# Patient Record
Sex: MALE | Race: BLACK OR AFRICAN AMERICAN | NOT HISPANIC OR LATINO | Employment: OTHER | RURAL
[De-identification: names, ages, dates, MRNs, and addresses within clinical notes are randomized per-mention and may not be internally consistent; named-entity substitution may affect disease eponyms.]

---

## 2020-07-16 ENCOUNTER — HISTORICAL (OUTPATIENT)
Dept: ADMINISTRATIVE | Facility: HOSPITAL | Age: 60
End: 2020-07-16

## 2020-07-30 ENCOUNTER — HISTORICAL (OUTPATIENT)
Dept: ADMINISTRATIVE | Facility: HOSPITAL | Age: 60
End: 2020-07-30

## 2020-07-30 LAB
ALBUMIN SERPL BCP-MCNC: 3.6 G/DL (ref 3.5–5)
ALBUMIN/GLOB SERPL: 0.7 {RATIO}
ALP SERPL-CCNC: 97 U/L (ref 45–115)
ALT SERPL W P-5'-P-CCNC: 33 U/L (ref 16–61)
ANION GAP SERPL CALCULATED.3IONS-SCNC: 22 MMOL/L
ANION GAP SERPL CALCULATED.3IONS-SCNC: 22 MMOL/L
APTT PPP: 26.5 SECONDS (ref 25.2–37.3)
AST SERPL W P-5'-P-CCNC: 20 U/L (ref 15–37)
BASOPHILS # BLD AUTO: 0.04 X10E3/UL (ref 0–0.2)
BASOPHILS NFR BLD AUTO: 0.3 % (ref 0–1)
BILIRUB SERPL-MCNC: 0.7 MG/DL (ref 0–1.2)
BILIRUB UR QL STRIP: NEGATIVE MG/DL
BUN SERPL-MCNC: 53 MG/DL (ref 7–18)
BUN SERPL-MCNC: 55 MG/DL (ref 7–18)
BUN/CREAT SERPL: 17.6
CALCIUM SERPL-MCNC: 9.1 MG/DL (ref 8.5–10.1)
CALCIUM SERPL-MCNC: 9.2 MG/DL (ref 8.5–10.1)
CHLORIDE SERPL-SCNC: 75 MMOL/L (ref 98–107)
CHLORIDE SERPL-SCNC: 80 MMOL/L (ref 98–107)
CK MB SERPL-MCNC: 2.2 NG/ML (ref 1–3.6)
CK SERPL-CCNC: 217 U/L (ref 39–308)
CLARITY UR: CLEAR
CO2 SERPL-SCNC: 17 MMOL/L (ref 21–32)
CO2 SERPL-SCNC: 21 MMOL/L (ref 21–32)
COLOR UR: YELLOW
CREAT SERPL-MCNC: 3.01 MG/DL (ref 0.7–1.3)
CREAT SERPL-MCNC: 3.11 MG/DL (ref 0.7–1.3)
EOSINOPHIL # BLD AUTO: 0.03 X10E3/UL (ref 0–0.5)
EOSINOPHIL NFR BLD AUTO: 0.3 % (ref 1–4)
ERYTHROCYTE [DISTWIDTH] IN BLOOD BY AUTOMATED COUNT: 14.3 % (ref 11.5–14.5)
GLOBULIN SER-MCNC: 5.1 G/DL (ref 2–4)
GLUCOSE SERPL-MCNC: 377 MG/DL (ref 70–105)
GLUCOSE SERPL-MCNC: 769 MG/DL (ref 74–106)
GLUCOSE SERPL-MCNC: 858 MG/DL (ref 74–106)
GLUCOSE SERPL-MCNC: >500 MG/DL (ref 60–95)
GLUCOSE SERPL-MCNC: >500 MG/DL (ref 60–95)
GLUCOSE UR STRIP-MCNC: ABNORMAL MG/DL
HCT VFR BLD AUTO: 43.2 % (ref 40–54)
HGB BLD-MCNC: 13.8 G/DL (ref 13.5–18)
IMM GRANULOCYTES # BLD AUTO: 0.06 X10E3/UL (ref 0–0.04)
IMM GRANULOCYTES NFR BLD: 0.5 % (ref 0–0.4)
INR BLD: 1.09 (ref 0–3.3)
KETONES UR STRIP-SCNC: ABNORMAL MG/DL
KETONES UR STRIP-SCNC: ABNORMAL MMOL/L
LACTATE SERPL-SCNC: 2.3 MMOL/L (ref 0.4–2)
LDH SERPL L TO P-CCNC: 2.3 MMOL/L (ref 0.3–1.2)
LDH SERPL L TO P-CCNC: 3.3 MMOL/L (ref 0.3–1.2)
LEUKOCYTE ESTERASE UR QL STRIP: NEGATIVE LEU/UL
LYMPHOCYTES # BLD AUTO: 1.1 X10E3/UL (ref 1–4.8)
LYMPHOCYTES NFR BLD AUTO: 9.3 % (ref 27–41)
MCH RBC QN AUTO: 29.4 PG (ref 27–31)
MCHC RBC AUTO-ENTMCNC: 31.9 G/DL (ref 32–36)
MCV RBC AUTO: 91.9 FL (ref 80–96)
MONOCYTES # BLD AUTO: 0.79 X10E3/UL (ref 0–0.8)
MONOCYTES NFR BLD AUTO: 6.6 % (ref 2–6)
MPC BLD CALC-MCNC: 13.8 FL (ref 9.4–12.4)
MYOGLOBIN SERPL-MCNC: 241 NG/ML (ref 16–116)
NEUTROPHILS # BLD AUTO: 9.87 X10E3/UL (ref 1.8–7.7)
NEUTROPHILS NFR BLD AUTO: 83 % (ref 53–65)
NITRITE UR QL STRIP: NEGATIVE
NRBC # BLD AUTO: 0 X10E3/UL (ref 0–0)
NRBC, AUTO (.00): 0 /100 (ref 0–0)
NT-PROBNP SERPL-MCNC: 95 PG/ML (ref 1–125)
PH UR STRIP: 5 PH UNITS (ref 5–8)
PLATELET # BLD AUTO: 263 X10E3/UL (ref 150–400)
PLATELET MORPHOLOGY: ABNORMAL
POC BASE EXCESS: -3.5 MMOL/L (ref -2–3)
POC BASE EXCESS: -3.9 MMOL/L (ref -2–3)
POC HCO3 VENOUS: 22 MMOL/L (ref 24–28)
POC HCO3 VENOUS: 24 MMOL/L (ref 24–28)
POC HCT: 48 % (ref 35–51)
POC HCT: 52 % (ref 35–51)
POC IONIZED CALCIUM: 1.13 MMOL/L (ref 1.15–1.35)
POC IONIZED CALCIUM: 1.14 MMOL/L (ref 1.15–1.35)
POC PCO2 VENOUS: 40 MM HG (ref 41–51)
POC PCO2 VENOUS: 53 MM HG (ref 41–51)
POC PH VENOUS: 7.27 PH UNITS (ref 7.32–7.42)
POC PH VENOUS: 7.34 PH UNITS (ref 7.32–7.42)
POC PO2 VENOUS: 23 MM HG (ref 25–40)
POC PO2 VENOUS: 30 MM HG (ref 25–40)
POC SATURATED O2 VENOUS: 31 % (ref 40–70)
POC SATURATED O2 VENOUS: 53 % (ref 40–70)
POTASSIUM SERPL-SCNC: 4.6 MMOL/L (ref 3.5–5.1)
POTASSIUM SERPL-SCNC: 4.7 MMOL/L (ref 3.4–4.5)
POTASSIUM SERPL-SCNC: 5 MMOL/L (ref 3.5–5.1)
POTASSIUM SERPL-SCNC: 5.2 MMOL/L (ref 3.4–4.5)
PROT SERPL-MCNC: 8.7 G/DL (ref 6.4–8.2)
PROT UR QL STRIP: ABNORMAL MG/DL
PROTHROMBIN TIME: 13.6 SECONDS (ref 11.7–14.7)
RBC # BLD AUTO: 4.7 X10E6/UL (ref 4.6–6.2)
RBC # UR STRIP: NEGATIVE ERY/UL
RBC MORPH BLD: NORMAL
SODIUM SERPL-SCNC: 113 MMOL/L (ref 136–145)
SODIUM SERPL-SCNC: 113 MMOL/L (ref 136–145)
SODIUM SERPL-SCNC: 114 MMOL/L (ref 136–145)
SODIUM SERPL-SCNC: 114 MMOL/L (ref 136–145)
SP GR UR STRIP: 1.01 (ref 1–1.03)
TROPONIN I SERPL-MCNC: <0.017 NG/ML (ref 0–0.06)
UROBILINOGEN UR STRIP-ACNC: 0.2 EU/DL
WBC # BLD AUTO: 11.89 X10E3/UL (ref 4.5–11)

## 2020-07-31 ENCOUNTER — HISTORICAL (OUTPATIENT)
Dept: ADMINISTRATIVE | Facility: HOSPITAL | Age: 60
End: 2020-07-31

## 2020-07-31 LAB
ANION GAP SERPL CALCULATED.3IONS-SCNC: 11 MMOL/L
ANION GAP SERPL CALCULATED.3IONS-SCNC: 12 MMOL/L
BUN SERPL-MCNC: 55 MG/DL (ref 7–18)
BUN SERPL-MCNC: 56 MG/DL (ref 7–18)
CALCIUM SERPL-MCNC: 8.8 MG/DL (ref 8.5–10.1)
CALCIUM SERPL-MCNC: 9 MG/DL (ref 8.5–10.1)
CHLORIDE SERPL-SCNC: 90 MMOL/L (ref 98–107)
CHLORIDE SERPL-SCNC: 91 MMOL/L (ref 98–107)
CHOLEST SERPL-MCNC: 230 MG/DL
CHOLEST/HDLC SERPL: 6.8 {RATIO}
CO2 SERPL-SCNC: 26 MMOL/L (ref 21–32)
CO2 SERPL-SCNC: 29 MMOL/L (ref 21–32)
CREAT SERPL-MCNC: 2.04 MG/DL (ref 0.7–1.3)
CREAT SERPL-MCNC: 2.64 MG/DL (ref 0.7–1.3)
GLUCOSE SERPL-MCNC: 259 MG/DL (ref 70–105)
GLUCOSE SERPL-MCNC: 270 MG/DL (ref 70–105)
GLUCOSE SERPL-MCNC: 285 MG/DL (ref 70–105)
GLUCOSE SERPL-MCNC: 306 MG/DL (ref 74–106)
GLUCOSE SERPL-MCNC: 318 MG/DL (ref 70–105)
GLUCOSE SERPL-MCNC: 322 MG/DL (ref 70–105)
GLUCOSE SERPL-MCNC: 330 MG/DL (ref 70–105)
GLUCOSE SERPL-MCNC: 340 MG/DL (ref 70–105)
GLUCOSE SERPL-MCNC: 352 MG/DL (ref 70–105)
GLUCOSE SERPL-MCNC: 385 MG/DL (ref 70–105)
GLUCOSE SERPL-MCNC: 405 MG/DL (ref 74–106)
GLUCOSE SERPL-MCNC: 433 MG/DL (ref 70–105)
HCT VFR BLD AUTO: 40.5 % (ref 40–54)
HDLC SERPL-MCNC: 34 MG/DL
HGB BLD-MCNC: 13.1 G/DL (ref 13.5–18)
LACTATE SERPL-SCNC: 1.7 MMOL/L (ref 0.4–2)
LDLC SERPL CALC-MCNC: NORMAL MG/DL
PLATELET # BLD AUTO: 232 X10E3/UL (ref 150–400)
POTASSIUM SERPL-SCNC: 3.4 MMOL/L (ref 3.5–5.1)
POTASSIUM SERPL-SCNC: 4.1 MMOL/L (ref 3.5–5.1)
SODIUM SERPL-SCNC: 125 MMOL/L (ref 136–145)
SODIUM SERPL-SCNC: 127 MMOL/L (ref 136–145)
T4 SERPL-MCNC: 5.5 UG/DL (ref 4.5–12.1)
TRIGL SERPL-MCNC: 444 MG/DL
TSH SERPL DL<=0.005 MIU/L-ACNC: 0.88 UIU/ML (ref 0.36–3.74)

## 2020-08-01 ENCOUNTER — HISTORICAL (OUTPATIENT)
Dept: ADMINISTRATIVE | Facility: HOSPITAL | Age: 60
End: 2020-08-01

## 2020-08-01 LAB
ANION GAP SERPL CALCULATED.3IONS-SCNC: 13 MMOL/L
BASOPHILS # BLD AUTO: 0.03 X10E3/UL (ref 0–0.2)
BASOPHILS NFR BLD AUTO: 0.3 % (ref 0–1)
BUN SERPL-MCNC: 44 MG/DL (ref 7–18)
CALCIUM SERPL-MCNC: 9 MG/DL (ref 8.5–10.1)
CHLORIDE SERPL-SCNC: 96 MMOL/L (ref 98–107)
CO2 SERPL-SCNC: 26 MMOL/L (ref 21–32)
CREAT SERPL-MCNC: 1.55 MG/DL (ref 0.7–1.3)
EOSINOPHIL # BLD AUTO: 0.09 X10E3/UL (ref 0–0.5)
EOSINOPHIL NFR BLD AUTO: 1 % (ref 1–4)
ERYTHROCYTE [DISTWIDTH] IN BLOOD BY AUTOMATED COUNT: 14.3 % (ref 11.5–14.5)
GLUCOSE SERPL-MCNC: 233 MG/DL (ref 74–106)
GLUCOSE SERPL-MCNC: 260 MG/DL (ref 70–105)
GLUCOSE SERPL-MCNC: 399 MG/DL (ref 70–105)
GLUCOSE SERPL-MCNC: 407 MG/DL (ref 70–105)
GLUCOSE SERPL-MCNC: 444 MG/DL (ref 70–105)
HCT VFR BLD AUTO: 38.7 % (ref 40–54)
HGB BLD-MCNC: 12.7 G/DL (ref 13.5–18)
IMM GRANULOCYTES # BLD AUTO: 0.04 X10E3/UL (ref 0–0.04)
IMM GRANULOCYTES NFR BLD: 0.4 % (ref 0–0.4)
LYMPHOCYTES # BLD AUTO: 1.6 X10E3/UL (ref 1–4.8)
LYMPHOCYTES NFR BLD AUTO: 17.5 % (ref 27–41)
MCH RBC QN AUTO: 28.9 PG (ref 27–31)
MCHC RBC AUTO-ENTMCNC: 32.8 G/DL (ref 32–36)
MCV RBC AUTO: 88 FL (ref 80–96)
MONOCYTES # BLD AUTO: 0.82 X10E3/UL (ref 0–0.8)
MONOCYTES NFR BLD AUTO: 9 % (ref 2–6)
MPC BLD CALC-MCNC: 12.9 FL (ref 9.4–12.4)
NEUTROPHILS # BLD AUTO: 6.56 X10E3/UL (ref 1.8–7.7)
NEUTROPHILS NFR BLD AUTO: 71.8 % (ref 53–65)
NRBC # BLD AUTO: 0 X10E3/UL (ref 0–0)
NRBC, AUTO (.00): 0 /100 (ref 0–0)
PLATELET # BLD AUTO: 216 X10E3/UL (ref 150–400)
PLATELET MORPHOLOGY: ABNORMAL
POTASSIUM SERPL-SCNC: 3.6 MMOL/L (ref 3.5–5.1)
RBC # BLD AUTO: 4.4 X10E6/UL (ref 4.6–6.2)
RBC MORPH BLD: NORMAL
SODIUM SERPL-SCNC: 131 MMOL/L (ref 136–145)
URATE SERPL-MCNC: 10.6 MG/DL (ref 3.5–7.2)
WBC # BLD AUTO: 9.14 X10E3/UL (ref 4.5–11)

## 2020-08-02 ENCOUNTER — HISTORICAL (OUTPATIENT)
Dept: ADMINISTRATIVE | Facility: HOSPITAL | Age: 60
End: 2020-08-02

## 2020-08-02 LAB
ANION GAP SERPL CALCULATED.3IONS-SCNC: 15 MMOL/L
BUN SERPL-MCNC: 38 MG/DL (ref 7–18)
CALCIUM SERPL-MCNC: 8.7 MG/DL (ref 8.5–10.1)
CHLORIDE SERPL-SCNC: 96 MMOL/L (ref 98–107)
CO2 SERPL-SCNC: 24 MMOL/L (ref 21–32)
CREAT SERPL-MCNC: 1.59 MG/DL (ref 0.7–1.3)
GLUCOSE SERPL-MCNC: 347 MG/DL (ref 74–106)
GLUCOSE SERPL-MCNC: 368 MG/DL (ref 70–105)
POTASSIUM SERPL-SCNC: 4 MMOL/L (ref 3.5–5.1)
SODIUM SERPL-SCNC: 131 MMOL/L (ref 136–145)

## 2020-08-04 LAB
GLUCOSE SERPL-MCNC: 475 MG/DL (ref 70–105)
GLUCOSE SERPL-MCNC: 489 MG/DL (ref 70–105)
GLUCOSE SERPL-MCNC: 502 MG/DL (ref 70–105)
GLUCOSE SERPL-MCNC: 545 MG/DL (ref 70–105)
GLUCOSE SERPL-MCNC: >600 MG/DL (ref 70–105)
GLUCOSE SERPL-MCNC: >600 MG/DL (ref 70–105)

## 2021-06-16 RX ORDER — FERROUS SULFATE 325(65) MG
325 TABLET ORAL
COMMUNITY

## 2021-06-16 RX ORDER — AMLODIPINE BESYLATE 10 MG/1
10 TABLET ORAL DAILY
COMMUNITY
End: 2021-09-10 | Stop reason: SDUPTHER

## 2021-06-16 RX ORDER — IPRATROPIUM BROMIDE AND ALBUTEROL SULFATE 2.5; .5 MG/3ML; MG/3ML
3 SOLUTION RESPIRATORY (INHALATION) EVERY 6 HOURS PRN
COMMUNITY
End: 2021-06-30 | Stop reason: SDUPTHER

## 2021-06-16 RX ORDER — ALLOPURINOL 100 MG/1
100 TABLET ORAL DAILY
Qty: 90 TABLET | Refills: 1 | Status: SHIPPED | OUTPATIENT
Start: 2021-06-16 | End: 2021-12-13

## 2021-06-16 RX ORDER — ASCORBATE CALCIUM 500 MG
1 TABLET ORAL DAILY
COMMUNITY

## 2021-06-16 RX ORDER — INDOMETHACIN 50 MG/1
50 CAPSULE ORAL 3 TIMES DAILY PRN
Qty: 30 CAPSULE | Refills: 3 | Status: SHIPPED | OUTPATIENT
Start: 2021-06-16 | End: 2021-06-30 | Stop reason: SDUPTHER

## 2021-06-16 RX ORDER — FUROSEMIDE 40 MG/1
40 TABLET ORAL 2 TIMES DAILY
COMMUNITY
End: 2021-09-13 | Stop reason: SDUPTHER

## 2021-06-16 RX ORDER — NAPROXEN SODIUM 220 MG/1
81 TABLET, FILM COATED ORAL DAILY
COMMUNITY

## 2021-06-16 RX ORDER — ALLOPURINOL 100 MG/1
100 TABLET ORAL DAILY
COMMUNITY
End: 2021-06-16 | Stop reason: SDUPTHER

## 2021-06-16 RX ORDER — INDOMETHACIN 50 MG/1
50 CAPSULE ORAL 3 TIMES DAILY PRN
COMMUNITY
End: 2021-06-16 | Stop reason: SDUPTHER

## 2021-06-16 RX ORDER — INSULIN GLARGINE 300 U/ML
40 INJECTION, SOLUTION SUBCUTANEOUS NIGHTLY
COMMUNITY
End: 2021-06-30 | Stop reason: SDUPTHER

## 2021-06-16 RX ORDER — CARVEDILOL 25 MG/1
25 TABLET ORAL 2 TIMES DAILY WITH MEALS
COMMUNITY
End: 2021-09-13 | Stop reason: SDUPTHER

## 2021-06-16 RX ORDER — ATORVASTATIN CALCIUM 40 MG/1
40 TABLET, FILM COATED ORAL DAILY
COMMUNITY

## 2021-06-16 RX ORDER — LISINOPRIL 20 MG/1
20 TABLET ORAL DAILY
COMMUNITY
End: 2021-06-30 | Stop reason: SDUPTHER

## 2021-06-16 RX ORDER — VIT C/E/ZN/COPPR/LUTEIN/ZEAXAN 250MG-90MG
1000 CAPSULE ORAL DAILY
COMMUNITY

## 2021-06-30 RX ORDER — ALBUTEROL SULFATE 90 UG/1
2 AEROSOL, METERED RESPIRATORY (INHALATION) EVERY 6 HOURS PRN
COMMUNITY
End: 2021-06-30 | Stop reason: SDUPTHER

## 2021-07-01 RX ORDER — INSULIN GLARGINE 300 U/ML
40 INJECTION, SOLUTION SUBCUTANEOUS NIGHTLY
Qty: 4 PEN | Refills: 1 | Status: SHIPPED | OUTPATIENT
Start: 2021-07-01 | End: 2022-03-22

## 2021-07-01 RX ORDER — LISINOPRIL 20 MG/1
20 TABLET ORAL DAILY
Qty: 90 TABLET | Refills: 1 | Status: SHIPPED | OUTPATIENT
Start: 2021-07-01 | End: 2022-09-19

## 2021-07-01 RX ORDER — ALBUTEROL SULFATE 90 UG/1
2 AEROSOL, METERED RESPIRATORY (INHALATION) EVERY 6 HOURS PRN
Qty: 18 G | Refills: 1 | Status: SHIPPED | OUTPATIENT
Start: 2021-07-01 | End: 2021-12-28

## 2021-07-01 RX ORDER — INDOMETHACIN 50 MG/1
50 CAPSULE ORAL 3 TIMES DAILY PRN
Qty: 30 CAPSULE | Refills: 3 | Status: SHIPPED | OUTPATIENT
Start: 2021-07-01 | End: 2022-09-19

## 2021-07-01 RX ORDER — IPRATROPIUM BROMIDE AND ALBUTEROL SULFATE 2.5; .5 MG/3ML; MG/3ML
3 SOLUTION RESPIRATORY (INHALATION) EVERY 6 HOURS PRN
Qty: 1 BOX | Refills: 1 | Status: SHIPPED | OUTPATIENT
Start: 2021-07-01 | End: 2021-09-30 | Stop reason: SDUPTHER

## 2021-07-05 RX ORDER — LISINOPRIL 20 MG/1
20 TABLET ORAL DAILY
Qty: 90 TABLET | Refills: 1 | OUTPATIENT
Start: 2021-07-05 | End: 2022-01-01

## 2021-07-05 RX ORDER — INDOMETHACIN 50 MG/1
50 CAPSULE ORAL 3 TIMES DAILY PRN
Qty: 30 CAPSULE | Refills: 3 | OUTPATIENT
Start: 2021-07-05 | End: 2022-01-01

## 2021-07-05 RX ORDER — IPRATROPIUM BROMIDE AND ALBUTEROL SULFATE 2.5; .5 MG/3ML; MG/3ML
3 SOLUTION RESPIRATORY (INHALATION) EVERY 6 HOURS PRN
Qty: 1 BOX | Refills: 1 | OUTPATIENT
Start: 2021-07-05

## 2021-07-15 ENCOUNTER — APPOINTMENT (OUTPATIENT)
Dept: RADIOLOGY | Facility: CLINIC | Age: 61
End: 2021-07-15
Attending: FAMILY MEDICINE
Payer: MEDICARE

## 2021-07-15 ENCOUNTER — OFFICE VISIT (OUTPATIENT)
Dept: FAMILY MEDICINE | Facility: CLINIC | Age: 61
End: 2021-07-15
Payer: MEDICARE

## 2021-07-15 VITALS
OXYGEN SATURATION: 92 % | HEART RATE: 88 BPM | SYSTOLIC BLOOD PRESSURE: 166 MMHG | DIASTOLIC BLOOD PRESSURE: 82 MMHG | WEIGHT: 315 LBS

## 2021-07-15 DIAGNOSIS — I50.22 CHRONIC SYSTOLIC CONGESTIVE HEART FAILURE: ICD-10-CM

## 2021-07-15 DIAGNOSIS — F10.10 ALCOHOL ABUSE: ICD-10-CM

## 2021-07-15 DIAGNOSIS — M17.0 BILATERAL PRIMARY OSTEOARTHRITIS OF KNEE: ICD-10-CM

## 2021-07-15 DIAGNOSIS — I50.22 CHRONIC SYSTOLIC CONGESTIVE HEART FAILURE: Primary | ICD-10-CM

## 2021-07-15 DIAGNOSIS — I10 ESSENTIAL HYPERTENSION: ICD-10-CM

## 2021-07-15 DIAGNOSIS — E11.65 UNCONTROLLED TYPE 2 DIABETES MELLITUS WITH HYPERGLYCEMIA: ICD-10-CM

## 2021-07-15 DIAGNOSIS — I42.5 OTHER RESTRICTIVE CARDIOMYOPATHY: ICD-10-CM

## 2021-07-15 DIAGNOSIS — F17.200 SMOKER: ICD-10-CM

## 2021-07-15 PROBLEM — M1A.09X0 CHRONIC IDIOPATHIC GOUT OF MULTIPLE SITES: Status: ACTIVE | Noted: 2021-07-15

## 2021-07-15 PROCEDURE — 3079F PR MOST RECENT DIASTOLIC BLOOD PRESSURE 80-89 MM HG: ICD-10-PCS | Mod: ,,, | Performed by: FAMILY MEDICINE

## 2021-07-15 PROCEDURE — 99214 OFFICE O/P EST MOD 30 MIN: CPT | Mod: 25,,, | Performed by: FAMILY MEDICINE

## 2021-07-15 PROCEDURE — 3077F PR MOST RECENT SYSTOLIC BLOOD PRESSURE >= 140 MM HG: ICD-10-PCS | Mod: ,,, | Performed by: FAMILY MEDICINE

## 2021-07-15 PROCEDURE — 96372 PR INJECTION,THERAP/PROPH/DIAG2ST, IM OR SUBCUT: ICD-10-PCS | Mod: ,,, | Performed by: FAMILY MEDICINE

## 2021-07-15 PROCEDURE — 71046 X-RAY EXAM CHEST 2 VIEWS: CPT | Mod: 26,,, | Performed by: RADIOLOGY

## 2021-07-15 PROCEDURE — 71046 XR CHEST PA AND LATERAL: ICD-10-PCS | Mod: 26,,, | Performed by: RADIOLOGY

## 2021-07-15 PROCEDURE — 96372 THER/PROPH/DIAG INJ SC/IM: CPT | Mod: ,,, | Performed by: FAMILY MEDICINE

## 2021-07-15 PROCEDURE — 71046 X-RAY EXAM CHEST 2 VIEWS: CPT | Mod: TC,RHCUB,FY | Performed by: FAMILY MEDICINE

## 2021-07-15 PROCEDURE — 3079F DIAST BP 80-89 MM HG: CPT | Mod: ,,, | Performed by: FAMILY MEDICINE

## 2021-07-15 PROCEDURE — 3077F SYST BP >= 140 MM HG: CPT | Mod: ,,, | Performed by: FAMILY MEDICINE

## 2021-07-15 PROCEDURE — 99214 PR OFFICE/OUTPT VISIT, EST, LEVL IV, 30-39 MIN: ICD-10-PCS | Mod: 25,,, | Performed by: FAMILY MEDICINE

## 2021-07-15 RX ORDER — POTASSIUM CHLORIDE 20 MEQ/1
20 TABLET, EXTENDED RELEASE ORAL
Status: COMPLETED | OUTPATIENT
Start: 2021-07-15 | End: 2021-07-15

## 2021-07-15 RX ORDER — IBUPROFEN 200 MG
200 TABLET ORAL EVERY 6 HOURS PRN
Status: ON HOLD | COMMUNITY
End: 2023-08-14 | Stop reason: HOSPADM

## 2021-07-15 RX ORDER — LANCETS 33 GAUGE
EACH MISCELLANEOUS
COMMUNITY
Start: 2021-07-07

## 2021-07-15 RX ORDER — LANOLIN ALCOHOL/MO/W.PET/CERES
100 CREAM (GRAM) TOPICAL DAILY
COMMUNITY

## 2021-07-15 RX ORDER — BLOOD-GLUCOSE METER
EACH MISCELLANEOUS
COMMUNITY
Start: 2021-04-29

## 2021-07-15 RX ORDER — FUROSEMIDE 10 MG/ML
20 INJECTION INTRAMUSCULAR; INTRAVENOUS
Status: COMPLETED | OUTPATIENT
Start: 2021-07-15 | End: 2021-07-15

## 2021-07-15 RX ORDER — BLOOD GLUCOSE CONTROL HIGH,LOW
EACH MISCELLANEOUS
COMMUNITY
Start: 2021-04-29

## 2021-07-15 RX ADMIN — FUROSEMIDE 20 MG: 10 INJECTION INTRAMUSCULAR; INTRAVENOUS at 12:07

## 2021-07-15 RX ADMIN — POTASSIUM CHLORIDE 20 MEQ: 20 TABLET, EXTENDED RELEASE ORAL at 12:07

## 2021-09-13 RX ORDER — CARVEDILOL 25 MG/1
25 TABLET ORAL 2 TIMES DAILY WITH MEALS
Qty: 180 TABLET | Refills: 0 | Status: ON HOLD | OUTPATIENT
Start: 2021-09-13 | End: 2022-10-06 | Stop reason: HOSPADM

## 2021-09-13 RX ORDER — AMLODIPINE BESYLATE 10 MG/1
10 TABLET ORAL DAILY
Qty: 90 TABLET | Refills: 0 | Status: SHIPPED | OUTPATIENT
Start: 2021-09-13

## 2021-09-13 RX ORDER — AMLODIPINE BESYLATE 10 MG/1
10 TABLET ORAL DAILY
Qty: 90 TABLET | Refills: 0 | Status: SHIPPED | OUTPATIENT
Start: 2021-09-13 | End: 2021-09-13 | Stop reason: SDUPTHER

## 2021-09-13 RX ORDER — FUROSEMIDE 40 MG/1
40 TABLET ORAL 2 TIMES DAILY
Qty: 180 TABLET | Refills: 0 | Status: ON HOLD | OUTPATIENT
Start: 2021-09-13 | End: 2022-10-06

## 2021-10-01 RX ORDER — LANCETS 33 GAUGE
EACH MISCELLANEOUS
OUTPATIENT
Start: 2021-10-01

## 2021-10-01 RX ORDER — AMLODIPINE BESYLATE 10 MG/1
10 TABLET ORAL DAILY
Qty: 90 TABLET | Refills: 0 | OUTPATIENT
Start: 2021-10-01

## 2021-10-01 RX ORDER — ATORVASTATIN CALCIUM 40 MG/1
40 TABLET, FILM COATED ORAL DAILY
Qty: 90 TABLET | Refills: 0 | OUTPATIENT
Start: 2021-10-01

## 2021-11-18 ENCOUNTER — TELEPHONE (OUTPATIENT)
Dept: FAMILY MEDICINE | Facility: CLINIC | Age: 61
End: 2021-11-18
Payer: MEDICARE

## 2021-11-18 DIAGNOSIS — E11.65 UNCONTROLLED TYPE 2 DIABETES MELLITUS WITH HYPERGLYCEMIA: Primary | ICD-10-CM

## 2021-11-18 RX ORDER — INSULIN ASPART 100 [IU]/ML
INJECTION, SOLUTION INTRAVENOUS; SUBCUTANEOUS
Qty: 4 EACH | Refills: 5 | Status: SHIPPED | OUTPATIENT
Start: 2021-11-18

## 2022-10-03 ENCOUNTER — HOSPITAL ENCOUNTER (INPATIENT)
Facility: HOSPITAL | Age: 62
LOS: 4 days | Discharge: HOME | DRG: 291 | End: 2022-10-07
Attending: INTERNAL MEDICINE | Admitting: INTERNAL MEDICINE
Payer: COMMERCIAL

## 2022-10-03 DIAGNOSIS — N18.2 STAGE 2 CHRONIC KIDNEY DISEASE: ICD-10-CM

## 2022-10-03 DIAGNOSIS — E11.65 UNCONTROLLED TYPE 2 DIABETES MELLITUS WITH HYPERGLYCEMIA: ICD-10-CM

## 2022-10-03 DIAGNOSIS — I50.9 ACUTE ON CHRONIC HEART FAILURE, UNSPECIFIED HEART FAILURE TYPE: ICD-10-CM

## 2022-10-03 DIAGNOSIS — M1A.09X0 IDIOPATHIC CHRONIC GOUT OF MULTIPLE SITES WITHOUT TOPHUS: ICD-10-CM

## 2022-10-03 DIAGNOSIS — E66.01 SEVERE OBESITY (BMI >= 40): ICD-10-CM

## 2022-10-03 DIAGNOSIS — M17.0 BILATERAL PRIMARY OSTEOARTHRITIS OF KNEE: ICD-10-CM

## 2022-10-03 DIAGNOSIS — I50.9 CHF (CONGESTIVE HEART FAILURE): ICD-10-CM

## 2022-10-03 DIAGNOSIS — I10 ESSENTIAL HYPERTENSION: ICD-10-CM

## 2022-10-03 DIAGNOSIS — J44.1 ACUTE EXACERBATION OF CHRONIC OBSTRUCTIVE PULMONARY DISEASE (COPD): ICD-10-CM

## 2022-10-03 DIAGNOSIS — I50.23 ACUTE ON CHRONIC SYSTOLIC HEART FAILURE: ICD-10-CM

## 2022-10-03 DIAGNOSIS — M10.9 GOUT OF MULTIPLE SITES, UNSPECIFIED CAUSE, UNSPECIFIED CHRONICITY: ICD-10-CM

## 2022-10-03 DIAGNOSIS — R06.02 SHORTNESS OF BREATH: ICD-10-CM

## 2022-10-03 DIAGNOSIS — I42.5 OTHER RESTRICTIVE CARDIOMYOPATHY: ICD-10-CM

## 2022-10-03 DIAGNOSIS — R07.9 CHEST PAIN: ICD-10-CM

## 2022-10-03 DIAGNOSIS — I50.9 CONGESTIVE HEART FAILURE, UNSPECIFIED HF CHRONICITY, UNSPECIFIED HEART FAILURE TYPE: Primary | ICD-10-CM

## 2022-10-03 LAB
ALBUMIN SERPL BCP-MCNC: 3.5 G/DL (ref 3.5–5)
ALBUMIN/GLOB SERPL: 0.8 {RATIO}
ALP SERPL-CCNC: 97 U/L (ref 45–115)
ALT SERPL W P-5'-P-CCNC: 60 U/L (ref 16–61)
ANION GAP SERPL CALCULATED.3IONS-SCNC: 9 MMOL/L (ref 7–16)
AST SERPL W P-5'-P-CCNC: 35 U/L (ref 15–37)
BASOPHILS # BLD AUTO: 0.02 K/UL (ref 0–0.2)
BASOPHILS NFR BLD AUTO: 0.2 % (ref 0–1)
BILIRUB SERPL-MCNC: 0.6 MG/DL (ref ?–1.2)
BUN SERPL-MCNC: 34 MG/DL (ref 7–18)
BUN/CREAT SERPL: 26 (ref 6–20)
CALCIUM SERPL-MCNC: 9 MG/DL (ref 8.5–10.1)
CHLORIDE SERPL-SCNC: 102 MMOL/L (ref 98–107)
CO2 SERPL-SCNC: 32 MMOL/L (ref 21–32)
CREAT SERPL-MCNC: 1.29 MG/DL (ref 0.7–1.3)
DIFFERENTIAL METHOD BLD: ABNORMAL
EGFR (NO RACE VARIABLE) (RUSH/TITUS): 63 ML/MIN/1.73M²
EOSINOPHIL # BLD AUTO: 0.27 K/UL (ref 0–0.5)
EOSINOPHIL NFR BLD AUTO: 2.2 % (ref 1–4)
ERYTHROCYTE [DISTWIDTH] IN BLOOD BY AUTOMATED COUNT: 16.8 % (ref 11.5–14.5)
FLUAV AG UPPER RESP QL IA.RAPID: NEGATIVE
FLUBV AG UPPER RESP QL IA.RAPID: NEGATIVE
GLOBULIN SER-MCNC: 4.6 G/DL (ref 2–4)
GLUCOSE SERPL-MCNC: 76 MG/DL (ref 74–106)
GLUCOSE SERPL-MCNC: 93 MG/DL (ref 70–105)
HCT VFR BLD AUTO: 33.1 % (ref 40–54)
HGB BLD-MCNC: 9.3 G/DL (ref 13.5–18)
IMM GRANULOCYTES # BLD AUTO: 0.05 K/UL (ref 0–0.04)
IMM GRANULOCYTES NFR BLD: 0.4 % (ref 0–0.4)
LYMPHOCYTES # BLD AUTO: 0.86 K/UL (ref 1–4.8)
LYMPHOCYTES NFR BLD AUTO: 7.1 % (ref 27–41)
MCH RBC QN AUTO: 26.1 PG (ref 27–31)
MCHC RBC AUTO-ENTMCNC: 28.1 G/DL (ref 32–36)
MCV RBC AUTO: 93 FL (ref 80–96)
MONOCYTES # BLD AUTO: 1.08 K/UL (ref 0–0.8)
MONOCYTES NFR BLD AUTO: 9 % (ref 2–6)
MPC BLD CALC-MCNC: 10 FL (ref 9.4–12.4)
NEUTROPHILS # BLD AUTO: 9.76 K/UL (ref 1.8–7.7)
NEUTROPHILS NFR BLD AUTO: 81.1 % (ref 53–65)
NRBC # BLD AUTO: 0.06 X10E3/UL
NRBC, AUTO (.00): 0.5 %
NT-PROBNP SERPL-MCNC: 635 PG/ML (ref 1–125)
PLATELET # BLD AUTO: 281 K/UL (ref 150–400)
POTASSIUM SERPL-SCNC: 4.4 MMOL/L (ref 3.5–5.1)
PROT SERPL-MCNC: 8.1 G/DL (ref 6.4–8.2)
RBC # BLD AUTO: 3.56 M/UL (ref 4.6–6.2)
SARS-COV+SARS-COV-2 AG RESP QL IA.RAPID: NEGATIVE
SODIUM SERPL-SCNC: 139 MMOL/L (ref 136–145)
TROPONIN I SERPL HS-MCNC: 12 PG/ML
WBC # BLD AUTO: 12.04 K/UL (ref 4.5–11)

## 2022-10-03 PROCEDURE — 25000003 PHARM REV CODE 250: Performed by: INTERNAL MEDICINE

## 2022-10-03 PROCEDURE — 36415 COLL VENOUS BLD VENIPUNCTURE: CPT | Performed by: NURSE PRACTITIONER

## 2022-10-03 PROCEDURE — 96374 THER/PROPH/DIAG INJ IV PUSH: CPT

## 2022-10-03 PROCEDURE — 85025 COMPLETE CBC W/AUTO DIFF WBC: CPT | Performed by: NURSE PRACTITIONER

## 2022-10-03 PROCEDURE — 63600175 PHARM REV CODE 636 W HCPCS: Performed by: INTERNAL MEDICINE

## 2022-10-03 PROCEDURE — 80053 COMPREHEN METABOLIC PANEL: CPT | Performed by: NURSE PRACTITIONER

## 2022-10-03 PROCEDURE — 93005 ELECTROCARDIOGRAM TRACING: CPT

## 2022-10-03 PROCEDURE — 11000001 HC ACUTE MED/SURG PRIVATE ROOM

## 2022-10-03 PROCEDURE — 63700000 PHARM REV CODE 250 ALT 637 W/O HCPCS: Performed by: INTERNAL MEDICINE

## 2022-10-03 PROCEDURE — 93010 EKG 12-LEAD: ICD-10-PCS | Mod: ,,, | Performed by: INTERNAL MEDICINE

## 2022-10-03 PROCEDURE — 99285 EMERGENCY DEPT VISIT HI MDM: CPT | Mod: 25

## 2022-10-03 PROCEDURE — 99223 1ST HOSP IP/OBS HIGH 75: CPT | Mod: ,,, | Performed by: INTERNAL MEDICINE

## 2022-10-03 PROCEDURE — 96376 TX/PRO/DX INJ SAME DRUG ADON: CPT

## 2022-10-03 PROCEDURE — 99223 PR INITIAL HOSPITAL CARE,LEVL III: ICD-10-PCS | Mod: ,,, | Performed by: INTERNAL MEDICINE

## 2022-10-03 PROCEDURE — 99284 PR EMERGENCY DEPT VISIT,LEVEL IV: ICD-10-PCS | Mod: ,,, | Performed by: NURSE PRACTITIONER

## 2022-10-03 PROCEDURE — 93010 ELECTROCARDIOGRAM REPORT: CPT | Mod: ,,, | Performed by: INTERNAL MEDICINE

## 2022-10-03 PROCEDURE — 87428 SARSCOV & INF VIR A&B AG IA: CPT | Performed by: NURSE PRACTITIONER

## 2022-10-03 PROCEDURE — 63600175 PHARM REV CODE 636 W HCPCS: Performed by: NURSE PRACTITIONER

## 2022-10-03 PROCEDURE — 84484 ASSAY OF TROPONIN QUANT: CPT | Performed by: NURSE PRACTITIONER

## 2022-10-03 PROCEDURE — 99284 EMERGENCY DEPT VISIT MOD MDM: CPT | Mod: ,,, | Performed by: NURSE PRACTITIONER

## 2022-10-03 PROCEDURE — 83880 ASSAY OF NATRIURETIC PEPTIDE: CPT | Performed by: NURSE PRACTITIONER

## 2022-10-03 PROCEDURE — 82962 GLUCOSE BLOOD TEST: CPT

## 2022-10-03 RX ORDER — HYDROCODONE BITARTRATE AND ACETAMINOPHEN 5; 325 MG/1; MG/1
1 TABLET ORAL EVERY 6 HOURS PRN
Status: DISCONTINUED | OUTPATIENT
Start: 2022-10-03 | End: 2022-10-07 | Stop reason: HOSPADM

## 2022-10-03 RX ORDER — ACETAMINOPHEN 325 MG/1
650 TABLET ORAL EVERY 4 HOURS PRN
Status: DISCONTINUED | OUTPATIENT
Start: 2022-10-03 | End: 2022-10-07 | Stop reason: HOSPADM

## 2022-10-03 RX ORDER — AZITHROMYCIN 250 MG/1
250 TABLET, FILM COATED ORAL DAILY
Status: COMPLETED | OUTPATIENT
Start: 2022-10-04 | End: 2022-10-07

## 2022-10-03 RX ORDER — NAPROXEN SODIUM 220 MG/1
81 TABLET, FILM COATED ORAL DAILY
Status: DISCONTINUED | OUTPATIENT
Start: 2022-10-04 | End: 2022-10-07 | Stop reason: HOSPADM

## 2022-10-03 RX ORDER — COLCHICINE 0.6 MG/1
0.6 TABLET, FILM COATED ORAL DAILY
Status: DISCONTINUED | OUTPATIENT
Start: 2022-10-04 | End: 2022-10-07 | Stop reason: HOSPADM

## 2022-10-03 RX ORDER — IPRATROPIUM BROMIDE AND ALBUTEROL SULFATE 2.5; .5 MG/3ML; MG/3ML
3 SOLUTION RESPIRATORY (INHALATION)
Status: DISCONTINUED | OUTPATIENT
Start: 2022-10-03 | End: 2022-10-07 | Stop reason: HOSPADM

## 2022-10-03 RX ORDER — ALLOPURINOL 100 MG/1
100 TABLET ORAL DAILY
Status: DISCONTINUED | OUTPATIENT
Start: 2022-10-04 | End: 2022-10-07 | Stop reason: HOSPADM

## 2022-10-03 RX ORDER — SPIRONOLACTONE 25 MG/1
25 TABLET ORAL DAILY
COMMUNITY

## 2022-10-03 RX ORDER — FUROSEMIDE 10 MG/ML
80 INJECTION INTRAMUSCULAR; INTRAVENOUS
Status: COMPLETED | OUTPATIENT
Start: 2022-10-03 | End: 2022-10-03

## 2022-10-03 RX ORDER — GLUCAGON 1 MG
1 KIT INJECTION
Status: DISCONTINUED | OUTPATIENT
Start: 2022-10-03 | End: 2022-10-07 | Stop reason: HOSPADM

## 2022-10-03 RX ORDER — ATORVASTATIN CALCIUM 40 MG/1
40 TABLET, FILM COATED ORAL DAILY
Status: DISCONTINUED | OUTPATIENT
Start: 2022-10-04 | End: 2022-10-07 | Stop reason: HOSPADM

## 2022-10-03 RX ORDER — IBUPROFEN 200 MG
16 TABLET ORAL
Status: DISCONTINUED | OUTPATIENT
Start: 2022-10-03 | End: 2022-10-07 | Stop reason: HOSPADM

## 2022-10-03 RX ORDER — INSULIN ASPART 100 [IU]/ML
1-10 INJECTION, SOLUTION INTRAVENOUS; SUBCUTANEOUS
Status: DISCONTINUED | OUTPATIENT
Start: 2022-10-03 | End: 2022-10-07 | Stop reason: HOSPADM

## 2022-10-03 RX ORDER — ALLOPURINOL 100 MG/1
100 TABLET ORAL DAILY
COMMUNITY

## 2022-10-03 RX ORDER — SODIUM CHLORIDE 0.9 % (FLUSH) 0.9 %
10 SYRINGE (ML) INJECTION EVERY 12 HOURS PRN
Status: DISCONTINUED | OUTPATIENT
Start: 2022-10-03 | End: 2022-10-07 | Stop reason: HOSPADM

## 2022-10-03 RX ORDER — AZITHROMYCIN 250 MG/1
500 TABLET, FILM COATED ORAL ONCE
Status: COMPLETED | OUTPATIENT
Start: 2022-10-03 | End: 2022-10-03

## 2022-10-03 RX ORDER — MORPHINE SULFATE 2 MG/ML
2 INJECTION, SOLUTION INTRAMUSCULAR; INTRAVENOUS EVERY 4 HOURS PRN
Status: DISCONTINUED | OUTPATIENT
Start: 2022-10-04 | End: 2022-10-07 | Stop reason: HOSPADM

## 2022-10-03 RX ORDER — IBUPROFEN 200 MG
24 TABLET ORAL
Status: DISCONTINUED | OUTPATIENT
Start: 2022-10-03 | End: 2022-10-07 | Stop reason: HOSPADM

## 2022-10-03 RX ORDER — COLCHICINE 0.6 MG/1
0.6 TABLET ORAL DAILY
COMMUNITY

## 2022-10-03 RX ORDER — IPRATROPIUM BROMIDE AND ALBUTEROL SULFATE 2.5; .5 MG/3ML; MG/3ML
3 SOLUTION RESPIRATORY (INHALATION) EVERY 6 HOURS
Status: DISCONTINUED | OUTPATIENT
Start: 2022-10-04 | End: 2022-10-07 | Stop reason: HOSPADM

## 2022-10-03 RX ORDER — AMLODIPINE BESYLATE 10 MG/1
10 TABLET ORAL DAILY
Status: DISCONTINUED | OUTPATIENT
Start: 2022-10-04 | End: 2022-10-07 | Stop reason: HOSPADM

## 2022-10-03 RX ORDER — ENOXAPARIN SODIUM 100 MG/ML
40 INJECTION SUBCUTANEOUS EVERY 24 HOURS
Status: DISCONTINUED | OUTPATIENT
Start: 2022-10-03 | End: 2022-10-07 | Stop reason: HOSPADM

## 2022-10-03 RX ORDER — ONDANSETRON 2 MG/ML
4 INJECTION INTRAMUSCULAR; INTRAVENOUS EVERY 8 HOURS PRN
Status: DISCONTINUED | OUTPATIENT
Start: 2022-10-03 | End: 2022-10-07 | Stop reason: HOSPADM

## 2022-10-03 RX ORDER — INSULIN GLARGINE 300 U/ML
40 INJECTION, SOLUTION SUBCUTANEOUS DAILY
Status: ON HOLD | COMMUNITY
End: 2023-08-14 | Stop reason: SDUPTHER

## 2022-10-03 RX ORDER — SPIRONOLACTONE 25 MG/1
25 TABLET ORAL DAILY
Status: DISCONTINUED | OUTPATIENT
Start: 2022-10-04 | End: 2022-10-07 | Stop reason: HOSPADM

## 2022-10-03 RX ORDER — NALOXONE HCL 0.4 MG/ML
0.02 VIAL (ML) INJECTION
Status: DISCONTINUED | OUTPATIENT
Start: 2022-10-03 | End: 2022-10-07 | Stop reason: HOSPADM

## 2022-10-03 RX ORDER — FUROSEMIDE 10 MG/ML
40 INJECTION INTRAMUSCULAR; INTRAVENOUS EVERY 6 HOURS
Status: DISCONTINUED | OUTPATIENT
Start: 2022-10-03 | End: 2022-10-04

## 2022-10-03 RX ORDER — CARVEDILOL 6.25 MG/1
12.5 TABLET ORAL 2 TIMES DAILY WITH MEALS
Status: DISCONTINUED | OUTPATIENT
Start: 2022-10-04 | End: 2022-10-07 | Stop reason: HOSPADM

## 2022-10-03 RX ADMIN — AZITHROMYCIN MONOHYDRATE 500 MG: 250 TABLET ORAL at 11:10

## 2022-10-03 RX ADMIN — FUROSEMIDE 40 MG: 10 INJECTION INTRAMUSCULAR; INTRAVENOUS at 10:10

## 2022-10-03 RX ADMIN — MORPHINE SULFATE 2 MG: 2 INJECTION, SOLUTION INTRAMUSCULAR; INTRAVENOUS at 11:10

## 2022-10-03 RX ADMIN — SACUBITRIL AND VALSARTAN 1 TABLET: 24; 26 TABLET, FILM COATED ORAL at 10:10

## 2022-10-03 RX ADMIN — FUROSEMIDE 80 MG: 10 INJECTION INTRAMUSCULAR; INTRAVENOUS at 06:10

## 2022-10-03 RX ADMIN — HYDROCODONE BITARTRATE AND ACETAMINOPHEN 1 TABLET: 5; 325 TABLET ORAL at 11:10

## 2022-10-03 RX ADMIN — ENOXAPARIN SODIUM 40 MG: 40 INJECTION SUBCUTANEOUS at 11:10

## 2022-10-03 NOTE — ED TRIAGE NOTES
Presents to ED via EMS from [facility] for c/o bilateral lower extremity swelling and SOB. Patient has hx of CHF and COPD, wears 3L O2 at home.

## 2022-10-03 NOTE — ED PROVIDER NOTES
Encounter Date: 10/3/2022       History     Chief Complaint   Patient presents with    Shortness of Breath    Leg Swelling     62 year old male presents to ED with complaint of shortness of breath and lower extremity swelling that started on last Wednesday. He states he was noticing an increase in SOB and was having to sleep in his recliner for 1 week due to feeling like he was smothering. Use of home O2 and states he has not had to increase oxygen demands at this time. States he attempted to see PCP at clinic in [place] and was unable to get an appointment. Denies chest pain, palpitations, weakness, dizziness, nausea/vomiting.     The history is provided by the patient. No  was used.   Shortness of Breath  The problem occurs intermittently.The current episode started more than 1 week ago. The problem has been gradually worsening. Associated symptoms include cough, orthopnea and leg swelling. Pertinent negatives include no fever, no sore throat, no sputum production, no chest pain and no vomiting. He has tried ipratropium inhalers and beta-agonist inhalers for the symptoms. The treatment provided no relief. He has had No prior hospitalizations. He has had No prior ED visits. He has had No prior ICU admissions. Associated medical issues include heart failure.   Review of patient's allergies indicates:  No Known Allergies  Past Medical History:   Diagnosis Date    Cardiomyopathy     Diabetes mellitus, type 2     Hypertension     Obstructive sleep apnea on CPAP     Osteoarthritis      History reviewed. No pertinent surgical history.  History reviewed. No pertinent family history.  Social History     Tobacco Use    Smoking status: Never    Smokeless tobacco: Never   Substance Use Topics    Alcohol use: Never    Drug use: Never     Review of Systems   Constitutional:  Negative for activity change, appetite change and fever.   HENT:  Negative for sore throat.    Respiratory:  Positive for cough and  shortness of breath. Negative for sputum production.    Cardiovascular:  Positive for orthopnea and leg swelling. Negative for chest pain.   Gastrointestinal:  Negative for nausea and vomiting.   Genitourinary:  Negative for difficulty urinating and urgency.   Musculoskeletal:  Negative for arthralgias and gait problem.   Skin:  Negative for color change and wound.   Allergic/Immunologic: Negative for environmental allergies and food allergies.   Neurological:  Negative for dizziness and weakness.   Hematological:  Negative for adenopathy. Does not bruise/bleed easily.   Psychiatric/Behavioral:  Negative for agitation and confusion.    All other systems reviewed and are negative.    Physical Exam     Initial Vitals [10/03/22 1631]   BP Pulse Resp Temp SpO2   (!) 164/84 85 17 98.5 °F (36.9 °C) 97 %      MAP       --         Physical Exam    Nursing note and vitals reviewed.  Constitutional: He appears well-developed and well-nourished.   HENT:   Head: Normocephalic and atraumatic.   Eyes: EOM are normal. Pupils are equal, round, and reactive to light.   Neck: Neck supple.   Normal range of motion.  Cardiovascular:  Normal rate and regular rhythm.           Pulmonary/Chest: He has no wheezes. He has no rhonchi.   Abdominal: He exhibits distension.   Abdomen round, firm; non-tender   Musculoskeletal:         General: No tenderness.      Cervical back: Normal range of motion and neck supple.      Right lower le+ Edema present.      Left lower le+ Edema present.      Comments: LE edema; nonpitting; hyperpigmentation     Neurological: He is alert and oriented to person, place, and time. No cranial nerve deficit or sensory deficit.   Skin: Skin is warm and dry. Capillary refill takes less than 2 seconds.   Psychiatric: He has a normal mood and affect. Thought content normal.       Medical Screening Exam   See Full Note    ED Course   Procedures  Labs Reviewed   COMPREHENSIVE METABOLIC PANEL - Abnormal; Notable for  the following components:       Result Value    BUN 34 (*)     BUN/Creatinine Ratio 26 (*)     Globulin 4.6 (*)     All other components within normal limits   NT-PRO NATRIURETIC PEPTIDE - Abnormal; Notable for the following components:    ProBNP 635 (*)     All other components within normal limits   CBC WITH DIFFERENTIAL - Abnormal; Notable for the following components:    WBC 12.04 (*)     RBC 3.56 (*)     Hemoglobin 9.3 (*)     Hematocrit 33.1 (*)     MCH 26.1 (*)     MCHC 28.1 (*)     RDW 16.8 (*)     Neutrophils % 81.1 (*)     Lymphocytes % 7.1 (*)     Monocytes % 9.0 (*)     nRBC, Auto 0.5 (*)     Neutrophils, Abs 9.76 (*)     Lymphocytes, Absolute 0.86 (*)     Monocytes, Absolute 1.08 (*)     Immature Granulocytes, Absolute 0.05 (*)     nRBC, Absolute 0.06 (*)     All other components within normal limits   TROPONIN I - Normal   SARS-COV2 (COVID) W/ FLU ANTIGEN - Normal    Narrative:     Negative SARS-CoV results should not be used as the sole basis for treatment or patient management decisions; negative results should be considered in the context of a patient's recent exposures, history and the presene of clinical signs and symptoms consistent with COVID-19.  Negative results should be treated as presumptive and confirmed by molecular assay, if necessary for patient management.   CBC W/ AUTO DIFFERENTIAL    Narrative:     The following orders were created for panel order CBC auto differential.  Procedure                               Abnormality         Status                     ---------                               -----------         ------                     CBC with Differential[[id number]]        Abnormal            Final result                 Please view results for these tests on the individual orders.   EXTRA TUBES    Narrative:     The following orders were created for panel order EXTRA TUBES.  Procedure                               Abnormality         Status                     ---------                                -----------         ------                     Light Blue Top Hold[[id number]]                              In process                   Please view results for these tests on the individual orders.   LIGHT BLUE TOP HOLD          Imaging Results              X-Ray Chest AP Portable (Final result)  Result time 10/03/22 17:48:20      Final result by Jermaine Saucedo MD (10/03/22 17:48:20)                   Impression:      Cardiomegaly and CHF changes.      Electronically signed by: Jermaine Saucedo  Date:    10/03/2022  Time:    17:48               Narrative:    EXAMINATION:  XR CHEST AP PORTABLE    CLINICAL HISTORY:  CHF;    TECHNIQUE:  Single frontal view of the chest was performed.    COMPARISON:  07/15/2021    FINDINGS:  Cardiomegaly again seen.  Interstitial densities throughout.  No pneumothorax.  Possible trace pleural effusions.                                       Medications   furosemide injection 80 mg (80 mg Intravenous Given 10/3/22 2658)                       Clinical Impression:   Final diagnoses:  [R06.02] Shortness of breath  [I50.9] Congestive heart failure, unspecified HF chronicity, unspecified heart failure type (Primary)        ED Disposition Condition    Admit Stable                JOHNNY Milligan  10/03/22 2001

## 2022-10-04 LAB
ANION GAP SERPL CALCULATED.3IONS-SCNC: 9 MMOL/L (ref 7–16)
AORTIC ROOT ANNULUS: 2.2 CM
AORTIC VALVE CUSP SEPERATION: 1.89 CM
AV INDEX (PROSTH): 0.77
AV MEAN GRADIENT: 4 MMHG
AV PEAK GRADIENT: 7 MMHG
AV VALVE AREA: 1.97 CM2
AV VELOCITY RATIO: 0.77
BSA FOR ECHO PROCEDURE: 2.87 M2
BUN SERPL-MCNC: 30 MG/DL (ref 7–18)
BUN/CREAT SERPL: 27 (ref 6–20)
CALCIUM SERPL-MCNC: 9.1 MG/DL (ref 8.5–10.1)
CHLORIDE SERPL-SCNC: 100 MMOL/L (ref 98–107)
CO2 SERPL-SCNC: 34 MMOL/L (ref 21–32)
CREAT SERPL-MCNC: 1.12 MG/DL (ref 0.7–1.3)
CV ECHO LV RWT: 0.4 CM
DOP CALC AO PEAK VEL: 1.3 M/S
DOP CALC AO VTI: 22 CM
DOP CALC LVOT AREA: 2.5 CM2
DOP CALC LVOT DIAMETER: 1.8 CM
DOP CALC LVOT PEAK VEL: 1 M/S
DOP CALC LVOT STROKE VOLUME: 43.24 CM3
DOP CALC MV VTI: 99.6 CM
DOP CALCLVOT PEAK VEL VTI: 17 CM
E WAVE DECELERATION TIME: 204 MSEC
ECHO EF ESTIMATED: 55 %
ECHO LV POSTERIOR WALL: 1.2 CM (ref 0.6–1.1)
EGFR (NO RACE VARIABLE) (RUSH/TITUS): 75 ML/MIN/1.73M²
EJECTION FRACTION: 65 %
FRACTIONAL SHORTENING: 31 % (ref 28–44)
GLUCOSE SERPL-MCNC: 130 MG/DL (ref 70–105)
GLUCOSE SERPL-MCNC: 147 MG/DL (ref 74–106)
GLUCOSE SERPL-MCNC: 168 MG/DL (ref 70–105)
GLUCOSE SERPL-MCNC: 318 MG/DL (ref 70–105)
INTERVENTRICULAR SEPTUM: 1.2 CM (ref 0.6–1.1)
IVC OSTIUM: 2.7 CM
LEFT ATRIUM SIZE: 3.1 CM
LEFT INTERNAL DIMENSION IN SYSTOLE: 4.08 CM (ref 2.1–4)
LEFT VENTRICLE DIASTOLIC VOLUME INDEX: 63.94 ML/M2
LEFT VENTRICLE DIASTOLIC VOLUME: 175.2 ML
LEFT VENTRICLE MASS INDEX: 112 G/M2
LEFT VENTRICLE SYSTOLIC VOLUME INDEX: 26.8 ML/M2
LEFT VENTRICLE SYSTOLIC VOLUME: 73.4 ML
LEFT VENTRICULAR INTERNAL DIMENSION IN DIASTOLE: 5.93 CM (ref 3.5–6)
LEFT VENTRICULAR MASS: 308.01 G
LVOT MG: 2 MMHG
MV MEAN GRADIENT: 45 MMHG
MV PEAK E VEL: 3.02 M/S
MV PEAK GRADIENT: 70 MMHG
MV STENOSIS PRESSURE HALF TIME: 79 MS
MV VALVE AREA BY CONTINUITY EQUATION: 0.43 CM2
MV VALVE AREA P 1/2 METHOD: 2.78 CM2
PISA TR MAX VEL: 2.8 M/S
POTASSIUM SERPL-SCNC: 4.6 MMOL/L (ref 3.5–5.1)
RA MAJOR: 5 CM
RA PRESSURE: 15 MMHG
RIGHT VENTRICULAR END-DIASTOLIC DIMENSION: 3.9 CM
SODIUM SERPL-SCNC: 138 MMOL/L (ref 136–145)
TR MAX PG: 31 MMHG
TRICUSPID ANNULAR PLANE SYSTOLIC EXCURSION: 2 CM
TROPONIN I SERPL HS-MCNC: 10.4 PG/ML
TV REST PULMONARY ARTERY PRESSURE: 46 MMHG

## 2022-10-04 PROCEDURE — 25500020 PHARM REV CODE 255: Performed by: HOSPITALIST

## 2022-10-04 PROCEDURE — 25000242 PHARM REV CODE 250 ALT 637 W/ HCPCS: Performed by: INTERNAL MEDICINE

## 2022-10-04 PROCEDURE — 11000001 HC ACUTE MED/SURG PRIVATE ROOM

## 2022-10-04 PROCEDURE — 63600175 PHARM REV CODE 636 W HCPCS: Performed by: HOSPITALIST

## 2022-10-04 PROCEDURE — 94640 AIRWAY INHALATION TREATMENT: CPT

## 2022-10-04 PROCEDURE — 99900035 HC TECH TIME PER 15 MIN (STAT)

## 2022-10-04 PROCEDURE — 94761 N-INVAS EAR/PLS OXIMETRY MLT: CPT

## 2022-10-04 PROCEDURE — 84484 ASSAY OF TROPONIN QUANT: CPT | Performed by: HOSPITALIST

## 2022-10-04 PROCEDURE — 99233 SBSQ HOSP IP/OBS HIGH 50: CPT | Mod: ,,, | Performed by: HOSPITALIST

## 2022-10-04 PROCEDURE — 27000221 HC OXYGEN, UP TO 24 HOURS

## 2022-10-04 PROCEDURE — 36415 COLL VENOUS BLD VENIPUNCTURE: CPT | Performed by: INTERNAL MEDICINE

## 2022-10-04 PROCEDURE — 63600175 PHARM REV CODE 636 W HCPCS: Performed by: INTERNAL MEDICINE

## 2022-10-04 PROCEDURE — 80048 BASIC METABOLIC PNL TOTAL CA: CPT | Performed by: INTERNAL MEDICINE

## 2022-10-04 PROCEDURE — 36415 COLL VENOUS BLD VENIPUNCTURE: CPT | Performed by: HOSPITALIST

## 2022-10-04 PROCEDURE — 82962 GLUCOSE BLOOD TEST: CPT

## 2022-10-04 PROCEDURE — 99233 PR SUBSEQUENT HOSPITAL CARE,LEVL III: ICD-10-PCS | Mod: ,,, | Performed by: HOSPITALIST

## 2022-10-04 PROCEDURE — 63700000 PHARM REV CODE 250 ALT 637 W/O HCPCS: Performed by: INTERNAL MEDICINE

## 2022-10-04 PROCEDURE — 25000003 PHARM REV CODE 250: Performed by: INTERNAL MEDICINE

## 2022-10-04 RX ORDER — DIPHENHYDRAMINE HYDROCHLORIDE 50 MG/ML
25 INJECTION INTRAMUSCULAR; INTRAVENOUS EVERY 6 HOURS PRN
Status: DISCONTINUED | OUTPATIENT
Start: 2022-10-04 | End: 2022-10-07 | Stop reason: HOSPADM

## 2022-10-04 RX ORDER — FUROSEMIDE 10 MG/ML
60 INJECTION INTRAMUSCULAR; INTRAVENOUS
Status: DISCONTINUED | OUTPATIENT
Start: 2022-10-04 | End: 2022-10-07 | Stop reason: HOSPADM

## 2022-10-04 RX ORDER — DIPHENHYDRAMINE HYDROCHLORIDE 50 MG/ML
25 INJECTION INTRAMUSCULAR; INTRAVENOUS EVERY 6 HOURS
Status: DISCONTINUED | OUTPATIENT
Start: 2022-10-04 | End: 2022-10-04

## 2022-10-04 RX ADMIN — SACUBITRIL AND VALSARTAN 1 TABLET: 24; 26 TABLET, FILM COATED ORAL at 08:10

## 2022-10-04 RX ADMIN — FUROSEMIDE 40 MG: 10 INJECTION INTRAMUSCULAR; INTRAVENOUS at 05:10

## 2022-10-04 RX ADMIN — MORPHINE SULFATE 2 MG: 2 INJECTION, SOLUTION INTRAMUSCULAR; INTRAVENOUS at 08:10

## 2022-10-04 RX ADMIN — DIPHENHYDRAMINE HYDROCHLORIDE 25 MG: 50 INJECTION, SOLUTION INTRAMUSCULAR; INTRAVENOUS at 01:10

## 2022-10-04 RX ADMIN — IPRATROPIUM BROMIDE AND ALBUTEROL SULFATE 3 ML: 2.5; .5 SOLUTION RESPIRATORY (INHALATION) at 07:10

## 2022-10-04 RX ADMIN — METHYLPREDNISOLONE SODIUM SUCCINATE 40 MG: 40 INJECTION, POWDER, FOR SOLUTION INTRAMUSCULAR; INTRAVENOUS at 03:10

## 2022-10-04 RX ADMIN — COLCHICINE 0.6 MG: 0.6 TABLET, FILM COATED ORAL at 08:10

## 2022-10-04 RX ADMIN — PERFLUTREN 1.5 ML: 6.52 INJECTION, SUSPENSION INTRAVENOUS at 08:10

## 2022-10-04 RX ADMIN — INSULIN ASPART 4 UNITS: 100 INJECTION, SOLUTION INTRAVENOUS; SUBCUTANEOUS at 09:10

## 2022-10-04 RX ADMIN — INSULIN DETEMIR 30 UNITS: 100 INJECTION, SOLUTION SUBCUTANEOUS at 08:10

## 2022-10-04 RX ADMIN — ATORVASTATIN CALCIUM 40 MG: 40 TABLET, FILM COATED ORAL at 08:10

## 2022-10-04 RX ADMIN — ALLOPURINOL 100 MG: 100 TABLET ORAL at 08:10

## 2022-10-04 RX ADMIN — SPIRONOLACTONE 25 MG: 25 TABLET ORAL at 08:10

## 2022-10-04 RX ADMIN — CARVEDILOL 12.5 MG: 6.25 TABLET, FILM COATED ORAL at 04:10

## 2022-10-04 RX ADMIN — IPRATROPIUM BROMIDE AND ALBUTEROL SULFATE 3 ML: 2.5; .5 SOLUTION RESPIRATORY (INHALATION) at 04:10

## 2022-10-04 RX ADMIN — AMLODIPINE BESYLATE 10 MG: 10 TABLET ORAL at 08:10

## 2022-10-04 RX ADMIN — IPRATROPIUM BROMIDE AND ALBUTEROL SULFATE 3 ML: 2.5; .5 SOLUTION RESPIRATORY (INHALATION) at 11:10

## 2022-10-04 RX ADMIN — FUROSEMIDE 60 MG: 10 INJECTION, SOLUTION INTRAMUSCULAR; INTRAVENOUS at 03:10

## 2022-10-04 RX ADMIN — ASPIRIN 81 MG CHEWABLE TABLET 81 MG: 81 TABLET CHEWABLE at 08:10

## 2022-10-04 RX ADMIN — ENOXAPARIN SODIUM 40 MG: 40 INJECTION SUBCUTANEOUS at 04:10

## 2022-10-04 RX ADMIN — AZITHROMYCIN MONOHYDRATE 250 MG: 250 TABLET ORAL at 08:10

## 2022-10-04 RX ADMIN — DIPHENHYDRAMINE HYDROCHLORIDE 25 MG: 50 INJECTION, SOLUTION INTRAMUSCULAR; INTRAVENOUS at 10:10

## 2022-10-04 RX ADMIN — SACUBITRIL AND VALSARTAN 1 TABLET: 24; 26 TABLET, FILM COATED ORAL at 09:10

## 2022-10-04 RX ADMIN — METHYLPREDNISOLONE SODIUM SUCCINATE 40 MG: 40 INJECTION, POWDER, FOR SOLUTION INTRAMUSCULAR; INTRAVENOUS at 09:10

## 2022-10-04 RX ADMIN — CARVEDILOL 12.5 MG: 6.25 TABLET, FILM COATED ORAL at 08:10

## 2022-10-04 RX ADMIN — METHYLPREDNISOLONE SODIUM SUCCINATE 40 MG: 40 INJECTION, POWDER, FOR SOLUTION INTRAMUSCULAR; INTRAVENOUS at 05:10

## 2022-10-04 NOTE — HPI
Patient is a 62-year-old morbidly obese male with TERI on CPAP, type 2 diabetes, essential hypertension, generalized osteoarthritis, unspecified congestive heart failure and COPD on supplemental oxygen who presents to the emergency room complaining of shortness of breath accompanied by PND, orthopnea, abdominal distension, scrotal and lower extremity edema.  Patient endorses cough with productive of white phlegm and wheezing, but otherwise denied any other associated symptoms such as fever chills hemoptysis chest pain or palpitation.  Patient is said to be compliant with medication as prescribed but continue to experience progressive worsening of aforementioned symptoms prompting ED visit this evening.  On presentation, vital signs were stable and patient was afebrile.  Physical examination was notable for diffuse rhonchi, abdominal distension, scrotal and bilateral lower extremity edema.  Workup in ED was notable for mildly elevated BNP level with chest x-ray demonstrating cardiomegaly with CHF changes, but otherwise unremarkable including EKG and troponin level.  Patient will be admitted for further evaluation and intervention

## 2022-10-04 NOTE — ASSESSMENT & PLAN NOTE
Patient has a history of unspecified heart failure but details are not available for my review at this time.  Will proceed with echocardiogram in a.m. In the meantime, patient will be diuresed with loop diuretic, MRA, reduced dose of beta-blocker and start Entresto.  SGLT2i was not available at inpatient pharmacy.

## 2022-10-04 NOTE — ASSESSMENT & PLAN NOTE
Continue Levemir and will start patient on sliding scale insulin to maintain CBG in the range of 130-180s

## 2022-10-04 NOTE — H&P
[facility] - Emergency Department  [facility] Medicine  History & Physical    Patient Name: Abilio Carroll  MRN: [id number]  Patient Class: Emergency  Admission Date: 10/3/2022  Attending Physician: DONALD Wells MD  Primary Care Provider: JOHNNY Bird         Patient information was obtained from patient and ER records.     Subjective:     Principal Problem:Acute on chronic heart failure    Chief Complaint:   Chief Complaint   Patient presents with    Shortness of Breath    Leg Swelling        HPI: Patient is a 62-year-old morbidly obese male with TERI on CPAP, type 2 diabetes, essential hypertension, generalized osteoarthritis, unspecified congestive heart failure and COPD on supplemental oxygen who presents to the emergency room complaining of shortness of breath accompanied by PND, orthopnea, abdominal distension, scrotal and lower extremity edema.  Patient endorses cough with productive of white phlegm and wheezing, but otherwise denied any other associated symptoms such as fever chills hemoptysis chest pain or palpitation.  Patient is said to be compliant with medication as prescribed but continue to experience progressive worsening of aforementioned symptoms prompting ED visit this evening.  On presentation, vital signs were stable and patient was afebrile.  Physical examination was notable for diffuse rhonchi, abdominal distension, scrotal and bilateral lower extremity edema.  Workup in ED was notable for mildly elevated BNP level with chest x-ray demonstrating cardiomegaly with CHF changes, but otherwise unremarkable including EKG and troponin level.  Patient will be admitted for further evaluation and intervention      Past Medical History:   Diagnosis Date    Cardiomyopathy     Diabetes mellitus, type 2     Hypertension     Obstructive sleep apnea on CPAP     Osteoarthritis        History reviewed. No pertinent surgical history.    Review of patient's allergies indicates:  No Known  Allergies    No current facility-administered medications on file prior to encounter.     Current Outpatient Medications on File Prior to Encounter   Medication Sig    albuterol-ipratropium (DUO-NEB) 2.5 mg-0.5 mg/3 mL nebulizer solution INHALE THE CONTENTS OF 1 VIAL VIA NEBULIZER EVERY 6 HOURS AS NEEDED FOR WHEEZING. RESCUE    allopurinoL (ZYLOPRIM) 100 MG tablet Take 100 mg by mouth once daily.    amLODIPine (NORVASC) 10 MG tablet Take 1 tablet (10 mg total) by mouth once daily.    atorvastatin (LIPITOR) 40 MG tablet Take 40 mg by mouth once daily.    carvediloL (COREG) 25 MG tablet Take 1 tablet (25 mg total) by mouth 2 (two) times daily with meals.    colchicine (COLCRYS) 0.6 mg tablet Take 0.6 mg by mouth once daily.    furosemide (LASIX) 40 MG tablet Take 1 tablet (40 mg total) by mouth 2 (two) times daily.    indomethacin (INDOCIN) 50 MG capsule TAKE 1 CAPSULE THREE TIMES DAILY AS NEEDED FOR GOUT (DO NOT TAKE FOR MORE THAN 5 DAYS AT A TIME)    insulin aspart U-100 (NOVOLOG) 100 unit/mL (3 mL) InPn pen 4 units in the morning before breakfast   If sugar is above 150 inject 4 more units in the PM. SQ    insulin glargine U-300 conc (TOUJEO MAX U-300 SOLOSTAR) 300 unit/mL (3 mL) insulin pen Inject 40 Units into the skin once daily.    lisinopriL (PRINIVIL,ZESTRIL) 20 MG tablet TAKE 1 TABLET EVERY DAY (Patient taking differently: Take 20 mg by mouth 2 (two) times a day.)    spironolactone (ALDACTONE) 25 MG tablet Take 25 mg by mouth once daily.    ACCU-CHEK LAILA CONTROL SOLN Soln     albuterol (PROVENTIL/VENTOLIN HFA) 90 mcg/actuation inhaler Inhale 2 puffs into the lungs every 6 (six) hours as needed for Wheezing. Rescue    ascorbate calcium, vitamin C, 500 mg Tab Take 1 tablet by mouth once daily.    aspirin 81 MG Chew Take 81 mg by mouth once daily.    blood sugar diagnostic (BLOOD GLUCOSE TEST) Strp by Misc.(Non-Drug; Combo Route) route.    cholecalciferol, vitamin D3, (VITAMIN D3) 25 mcg  (1,000 unit) capsule Take 1,000 Units by mouth once daily.    cyanocobalamin (VITAMIN B-12) 1000 MCG tablet Take 100 mcg by mouth once daily.    ferrous sulfate (FEOSOL) 325 mg (65 mg iron) Tab tablet Take 325 mg by mouth daily with breakfast.    ibuprofen (ADVIL,MOTRIN) 200 MG tablet Take 200 mg by mouth every 6 (six) hours as needed for Pain.    SITagliptin (JANUVIA) 50 MG Tab Take 50 mg by mouth every morning.    TRUE METRIX AIR GLUCOSE METER kit     TRUEPLUS LANCETS 33 gauge Misc      Family History    None       Tobacco Use    Smoking status: Never    Smokeless tobacco: Never   Substance and Sexual Activity    Alcohol use: Never    Drug use: Never    Sexual activity: Not on file     Review of Systems   Constitutional:  Negative for chills, diaphoresis, fatigue and fever.   HENT:  Negative for congestion, hearing loss, nosebleeds, postnasal drip, sore throat, tinnitus and trouble swallowing.    Eyes:  Negative for photophobia, pain, discharge, itching and visual disturbance.   Respiratory:  Positive for cough, shortness of breath and wheezing. Negative for stridor.    Cardiovascular:  Positive for leg swelling. Negative for chest pain and palpitations.   Gastrointestinal:  Positive for abdominal distention. Negative for abdominal pain, anal bleeding, blood in stool, constipation, diarrhea, nausea and vomiting.   Endocrine: Negative for cold intolerance, heat intolerance, polydipsia, polyphagia and polyuria.   Genitourinary:  Negative for decreased urine volume, difficulty urinating, dysuria, flank pain, frequency, hematuria and urgency.   Musculoskeletal:  Negative for arthralgias, back pain, gait problem, joint swelling, myalgias, neck pain and neck stiffness.   Skin:  Negative for color change, pallor and rash.   Allergic/Immunologic: Negative for immunocompromised state.   Neurological:  Negative for dizziness, tremors, seizures, syncope, facial asymmetry, speech difficulty, weakness,  light-headedness, numbness and headaches.   Hematological:  Negative for adenopathy. Does not bruise/bleed easily.   Objective:     Vital Signs (Most Recent):  Temp: 98.5 °F (36.9 °C) (10/03/22 1631)  Pulse: 85 (10/03/22 1956)  Resp: 10 (10/03/22 2001)  BP: (!) 154/83 (10/03/22 1956)  SpO2: 96 % (10/03/22 2001)   Vital Signs (24h Range):  Temp:  [98.5 °F (36.9 °C)] 98.5 °F (36.9 °C)  Pulse:  [85-89] 85  Resp:  [10-29] 10  SpO2:  [91 %-97 %] 96 %  BP: (154-193)/(83-96) 154/83     Weight: (!) 152.4 kg (336 lb)  Body mass index is 44.33 kg/m².    Physical Exam  Vitals reviewed.   Constitutional:       General: He is not in acute distress.     Appearance: Normal appearance.   HENT:      Head: Normocephalic and atraumatic.      Right Ear: External ear normal.      Left Ear: External ear normal.   Eyes:      Extraocular Movements: Extraocular movements intact.      Pupils: Pupils are equal, round, and reactive to light.   Cardiovascular:      Rate and Rhythm: Normal rate and regular rhythm.      Pulses: Normal pulses.      Heart sounds: Normal heart sounds. No murmur heard.  Pulmonary:      Effort: Pulmonary effort is normal. No respiratory distress.      Breath sounds: Wheezing and rhonchi present.   Chest:      Chest wall: No tenderness.   Abdominal:      General: There is distension.      Palpations: There is no mass.      Tenderness: There is no abdominal tenderness. There is no right CVA tenderness or left CVA tenderness.   Genitourinary:     Comments: Mild scrotal edema present  Musculoskeletal:         General: No swelling or tenderness. Normal range of motion.      Right lower leg: Edema present.      Left lower leg: Edema present.   Skin:     General: Skin is warm and dry.      Capillary Refill: Capillary refill takes less than 2 seconds.   Neurological:      General: No focal deficit present.      Mental Status: He is alert and oriented to person, place, and time. Mental status is at baseline.   Psychiatric:          Mood and Affect: Mood normal.         Thought Content: Thought content normal.     Cranial nerves 2-12 were grossly intact     Significant Labs: All pertinent labs within the past 24 hours have been reviewed.    Significant Imaging: I have reviewed all pertinent imaging results/findings within the past 24 hours.    Assessment/Plan:     * Acute on chronic heart failure    Patient has a history of unspecified heart failure but details are not available for my review at this time.  Will proceed with echocardiogram in a.m. In the meantime, patient will be diuresed with loop diuretic, MRA, reduced dose of beta-blocker and start Entresto.  SGLT2i was not available at inpatient pharmacy.      Acute exacerbation of chronic obstructive pulmonary disease (COPD)    Likely due to pulmonary edema from decompensated heart failure.  Will start patient on Zithromax for its immunomodulatory properties along with IV steroid and scheduled/PRN neb treatments      Uncontrolled type 2 diabetes mellitus with hyperglycemia    Continue Levemir and will start patient on sliding scale insulin to maintain CBG in the range of 130-180s    Stage 2 chronic kidney disease    Likely due to combination of hypertension, chronic heart failure, and type 2 diabetes  Will need to hold off on nonsteroidals for time being      Essential hypertension    Continue present management      Chronic idiopathic gout of multiple sites    Continue present management consisting of allopurinol and colchicine        VTE Risk Mitigation (From admission, onward)    Lovenox 40 mg subQ Q 24 hours             Min BARBER Dodge MD  Department of Hospital Medicine   [facility] - Emergency Department

## 2022-10-04 NOTE — PLAN OF CARE
Problem: Adult Inpatient Plan of Care  Goal: Plan of Care Review  Outcome: Ongoing, Progressing  Goal: Patient-Specific Goal (Individualized)  Outcome: Ongoing, Progressing  Goal: Absence of Hospital-Acquired Illness or Injury  Outcome: Ongoing, Progressing  Goal: Optimal Comfort and Wellbeing  Outcome: Ongoing, Progressing  Goal: Readiness for Transition of Care  Outcome: Ongoing, Progressing     Problem: Bariatric Environmental Safety  Goal: Safety Maintained with Care  Outcome: Ongoing, Progressing     Problem: Diabetes Comorbidity  Goal: Blood Glucose Level Within Targeted Range  Outcome: Ongoing, Progressing     Problem: Gas Exchange Impaired  Goal: Optimal Gas Exchange  Outcome: Ongoing, Progressing

## 2022-10-04 NOTE — PLAN OF CARE
[facility]  Initial Discharge Assessment       Primary Care Provider: JOHNNY Bird    Admission Diagnosis: Shortness of breath [R06.02]  CHF (congestive heart failure) [I50.9]  Chest pain [R07.9]  Acute on chronic heart failure, unspecified heart failure type [I50.9]  Congestive heart failure, unspecified HF chronicity, unspecified heart failure type [I50.9]    Admission Date: 10/3/2022  Expected Discharge Date:     Discharge Barriers Identified: None    Payor: HUMANA / Plan: HUMANA  PPO / Product Type: PPO /     Extended Emergency Contact Information  Primary Emergency Contact: FRED BLAKE  Mobile Phone: [phone]  Relation: Mother  Preferred language: English   needed? No    Discharge Plan A: Home with family  Discharge Plan B: Home with family      [facility] - [facility] [address]  [address] [facility] [address] [address]  Phone: [phone] Fax: [phone]    [facility] [place] [facility] [address] [facility] [address] [facility] [address] [place] [address]  Phone: [phone] Fax: [phone]      Initial Assessment (most recent)       Adult Discharge Assessment - 10/04/22 1111          Discharge Assessment    Assessment Type Discharge Planning Assessment     Source of Information patient     Communicated TONG with patient/caregiver Date not available/Unable to determine     Lives With other relative(s)     Do you expect to return to your current living situation? Yes     Do you have help at home or someone to help you manage your care at home? Yes     Who are your caregiver(s) and their phone number(s)? Tavon Aguilar (nephew) [phone]     Prior to hospitilization cognitive status: Alert/Oriented     Current cognitive status: Alert/Oriented     Walking or Climbing Stairs Difficulty ambulation difficulty, requires equipment     Mobility Management cane     Dressing/Bathing Difficulty none     Home Accessibility  wheelchair accessible     Home Layout Able to live on 1st floor     Equipment Currently Used at Home cane, straight;oxygen     Readmission within 30 days? No     Patient currently being followed by outpatient case management? No     Do you currently have service(s) that help you manage your care at home? No     Do you take prescription medications? Yes     Do you have prescription coverage? Yes     Do you have any problems affording any of your prescribed medications? No     Is the patient taking medications as prescribed? yes     How do you get to doctors appointments? family or friend will provide     Are you on dialysis? No     Do you take coumadin? No     Discharge Plan A Home with family     Discharge Plan B Home with family     DME Needed Upon Discharge  none     Discharge Plan discussed with: Patient     Discharge Barriers Identified None        Physical Activity    On average, how many days per week do you engage in moderate to strenuous exercise (like a brisk walk)? 0 days     On average, how many minutes do you engage in exercise at this level? 0 min        Financial Resource Strain    How hard is it for you to pay for the very basics like food, housing, medical care, and heating? Not hard at all        Housing Stability    In the last 12 months, was there a time when you were not able to pay the mortgage or rent on time? No     In the last 12 months, how many places have you lived? 1     In the last 12 months, was there a time when you did not have a steady place to sleep or slept in a shelter (including now)? No        Transportation Needs    In the past 12 months, has lack of transportation kept you from medical appointments or from getting medications? No     In the past 12 months, has lack of transportation kept you from meetings, work, or from getting things needed for daily living? No        Food Insecurity    Within the past 12 months, you worried that your food would run out before you got the  money to buy more. Never true     Within the past 12 months, the food you bought just didn't last and you didn't have money to get more. Never true        Stress    Do you feel stress - tense, restless, nervous, or anxious, or unable to sleep at night because your mind is troubled all the time - these days? Not at all        Social Connections    In a typical week, how many times do you talk on the phone with family, friends, or neighbors? More than three times a week     How often do you get together with friends or relatives? Once a week     How often do you attend Yarsani or Rastafari services? More than 4 times per year     Do you belong to any clubs or organizations such as Yarsani groups, unions, fraternal or athletic groups, or school groups? Yes     How often do you attend meetings of the clubs or organizations you belong to? More than 4 times per year     Are you , , , , never , or living with a partner?         Alcohol Use    Q1: How often do you have a drink containing alcohol? 4 or more times a week     Q2: How many drinks containing alcohol do you have on a typical day when you are drinking? 1 or 2     Q3: How often do you have six or more drinks on one occasion? Never        Relationship/Environment    Name(s) of Who Lives With Patient Tavon Aguilar (nephew) [phone]                      Patient lives at home with his nephew. He has a cane and oxygen for home use. He is not current with HH. Plans to return home at discharge. IMM reviewed. SDOH complete. SS following.

## 2022-10-04 NOTE — ASSESSMENT & PLAN NOTE
Likely due to pulmonary edema from decompensated heart failure.  Will start patient on Zithromax for its immunomodulatory properties along with IV steroid and scheduled/PRN neb treatments

## 2022-10-04 NOTE — ASSESSMENT & PLAN NOTE
Likely due to combination of hypertension, chronic heart failure, and type 2 diabetes  Will need to hold off on nonsteroidals for time being

## 2022-10-04 NOTE — SUBJECTIVE & OBJECTIVE
Past Medical History:   Diagnosis Date    Cardiomyopathy     Diabetes mellitus, type 2     Hypertension     Obstructive sleep apnea on CPAP     Osteoarthritis        History reviewed. No pertinent surgical history.    Review of patient's allergies indicates:  No Known Allergies    No current facility-administered medications on file prior to encounter.     Current Outpatient Medications on File Prior to Encounter   Medication Sig    albuterol-ipratropium (DUO-NEB) 2.5 mg-0.5 mg/3 mL nebulizer solution INHALE THE CONTENTS OF 1 VIAL VIA NEBULIZER EVERY 6 HOURS AS NEEDED FOR WHEEZING. RESCUE    allopurinoL (ZYLOPRIM) 100 MG tablet Take 100 mg by mouth once daily.    amLODIPine (NORVASC) 10 MG tablet Take 1 tablet (10 mg total) by mouth once daily.    atorvastatin (LIPITOR) 40 MG tablet Take 40 mg by mouth once daily.    carvediloL (COREG) 25 MG tablet Take 1 tablet (25 mg total) by mouth 2 (two) times daily with meals.    colchicine (COLCRYS) 0.6 mg tablet Take 0.6 mg by mouth once daily.    furosemide (LASIX) 40 MG tablet Take 1 tablet (40 mg total) by mouth 2 (two) times daily.    indomethacin (INDOCIN) 50 MG capsule TAKE 1 CAPSULE THREE TIMES DAILY AS NEEDED FOR GOUT (DO NOT TAKE FOR MORE THAN 5 DAYS AT A TIME)    insulin aspart U-100 (NOVOLOG) 100 unit/mL (3 mL) InPn pen 4 units in the morning before breakfast   If sugar is above 150 inject 4 more units in the PM. SQ    insulin glargine U-300 conc (TOUJEO MAX U-300 SOLOSTAR) 300 unit/mL (3 mL) insulin pen Inject 40 Units into the skin once daily.    lisinopriL (PRINIVIL,ZESTRIL) 20 MG tablet TAKE 1 TABLET EVERY DAY (Patient taking differently: Take 20 mg by mouth 2 (two) times a day.)    spironolactone (ALDACTONE) 25 MG tablet Take 25 mg by mouth once daily.    ACCU-CHEK LAILA CONTROL SOLN Soln     albuterol (PROVENTIL/VENTOLIN HFA) 90 mcg/actuation inhaler Inhale 2 puffs into the lungs every 6 (six) hours as needed for Wheezing. Rescue    ascorbate calcium,  vitamin C, 500 mg Tab Take 1 tablet by mouth once daily.    aspirin 81 MG Chew Take 81 mg by mouth once daily.    blood sugar diagnostic (BLOOD GLUCOSE TEST) Strp by Misc.(Non-Drug; Combo Route) route.    cholecalciferol, vitamin D3, (VITAMIN D3) 25 mcg (1,000 unit) capsule Take 1,000 Units by mouth once daily.    cyanocobalamin (VITAMIN B-12) 1000 MCG tablet Take 100 mcg by mouth once daily.    ferrous sulfate (FEOSOL) 325 mg (65 mg iron) Tab tablet Take 325 mg by mouth daily with breakfast.    ibuprofen (ADVIL,MOTRIN) 200 MG tablet Take 200 mg by mouth every 6 (six) hours as needed for Pain.    SITagliptin (JANUVIA) 50 MG Tab Take 50 mg by mouth every morning.    TRUE METRIX AIR GLUCOSE METER kit     TRUEPLUS LANCETS 33 gauge Misc      Family History    None       Tobacco Use    Smoking status: Never    Smokeless tobacco: Never   Substance and Sexual Activity    Alcohol use: Never    Drug use: Never    Sexual activity: Not on file     Review of Systems   Constitutional:  Negative for chills, diaphoresis, fatigue and fever.   HENT:  Negative for congestion, hearing loss, nosebleeds, postnasal drip, sore throat, tinnitus and trouble swallowing.    Eyes:  Negative for photophobia, pain, discharge, itching and visual disturbance.   Respiratory:  Positive for cough, shortness of breath and wheezing. Negative for stridor.    Cardiovascular:  Positive for leg swelling. Negative for chest pain and palpitations.   Gastrointestinal:  Positive for abdominal distention. Negative for abdominal pain, anal bleeding, blood in stool, constipation, diarrhea, nausea and vomiting.   Endocrine: Negative for cold intolerance, heat intolerance, polydipsia, polyphagia and polyuria.   Genitourinary:  Negative for decreased urine volume, difficulty urinating, dysuria, flank pain, frequency, hematuria and urgency.   Musculoskeletal:  Negative for arthralgias, back pain, gait problem, joint swelling, myalgias, neck pain and neck stiffness.    Skin:  Negative for color change, pallor and rash.   Allergic/Immunologic: Negative for immunocompromised state.   Neurological:  Negative for dizziness, tremors, seizures, syncope, facial asymmetry, speech difficulty, weakness, light-headedness, numbness and headaches.   Hematological:  Negative for adenopathy. Does not bruise/bleed easily.   Objective:     Vital Signs (Most Recent):  Temp: 98.5 °F (36.9 °C) (10/03/22 1631)  Pulse: 85 (10/03/22 1956)  Resp: 10 (10/03/22 2001)  BP: (!) 154/83 (10/03/22 1956)  SpO2: 96 % (10/03/22 2001)   Vital Signs (24h Range):  Temp:  [98.5 °F (36.9 °C)] 98.5 °F (36.9 °C)  Pulse:  [85-89] 85  Resp:  [10-29] 10  SpO2:  [91 %-97 %] 96 %  BP: (154-193)/(83-96) 154/83     Weight: (!) 152.4 kg (336 lb)  Body mass index is 44.33 kg/m².    Physical Exam  Vitals reviewed.   Constitutional:       General: He is not in acute distress.     Appearance: Normal appearance.   HENT:      Head: Normocephalic and atraumatic.      Right Ear: External ear normal.      Left Ear: External ear normal.   Eyes:      Extraocular Movements: Extraocular movements intact.      Pupils: Pupils are equal, round, and reactive to light.   Cardiovascular:      Rate and Rhythm: Normal rate and regular rhythm.      Pulses: Normal pulses.      Heart sounds: Normal heart sounds. No murmur heard.  Pulmonary:      Effort: Pulmonary effort is normal. No respiratory distress.      Breath sounds: Wheezing and rhonchi present.   Chest:      Chest wall: No tenderness.   Abdominal:      General: There is distension.      Palpations: There is no mass.      Tenderness: There is no abdominal tenderness. There is no right CVA tenderness or left CVA tenderness.   Genitourinary:     Comments: Mild scrotal edema present  Musculoskeletal:         General: No swelling or tenderness. Normal range of motion.      Right lower leg: Edema present.      Left lower leg: Edema present.   Skin:     General: Skin is warm and dry.       Capillary Refill: Capillary refill takes less than 2 seconds.   Neurological:      General: No focal deficit present.      Mental Status: He is alert and oriented to person, place, and time. Mental status is at baseline.   Psychiatric:         Mood and Affect: Mood normal.         Thought Content: Thought content normal.     Cranial nerves 2-12 were grossly intact     Significant Labs: All pertinent labs within the past 24 hours have been reviewed.    Significant Imaging: I have reviewed all pertinent imaging results/findings within the past 24 hours.

## 2022-10-05 PROBLEM — E66.01 SEVERE OBESITY (BMI >= 40): Status: ACTIVE | Noted: 2022-10-05

## 2022-10-05 PROBLEM — M10.9 GOUT: Status: ACTIVE | Noted: 2022-10-05

## 2022-10-05 LAB
ANION GAP SERPL CALCULATED.3IONS-SCNC: 11 MMOL/L (ref 7–16)
BUN SERPL-MCNC: 18 MG/DL (ref 7–18)
BUN/CREAT SERPL: 16 (ref 6–20)
CALCIUM SERPL-MCNC: 9.8 MG/DL (ref 8.5–10.1)
CHLORIDE SERPL-SCNC: 100 MMOL/L (ref 98–107)
CO2 SERPL-SCNC: 31 MMOL/L (ref 21–32)
CREAT SERPL-MCNC: 1.16 MG/DL (ref 0.7–1.3)
EGFR (NO RACE VARIABLE) (RUSH/TITUS): 72 ML/MIN/1.73M²
GLUCOSE SERPL-MCNC: 224 MG/DL (ref 70–105)
GLUCOSE SERPL-MCNC: 255 MG/DL (ref 70–105)
GLUCOSE SERPL-MCNC: 270 MG/DL (ref 70–105)
GLUCOSE SERPL-MCNC: 346 MG/DL (ref 70–105)
GLUCOSE SERPL-MCNC: 90 MG/DL (ref 74–106)
POTASSIUM SERPL-SCNC: 4.2 MMOL/L (ref 3.5–5.1)
SODIUM SERPL-SCNC: 138 MMOL/L (ref 136–145)

## 2022-10-05 PROCEDURE — 63600175 PHARM REV CODE 636 W HCPCS: Performed by: INTERNAL MEDICINE

## 2022-10-05 PROCEDURE — 80048 BASIC METABOLIC PNL TOTAL CA: CPT | Performed by: INTERNAL MEDICINE

## 2022-10-05 PROCEDURE — 25000003 PHARM REV CODE 250: Performed by: HOSPITALIST

## 2022-10-05 PROCEDURE — 99233 PR SUBSEQUENT HOSPITAL CARE,LEVL III: ICD-10-PCS | Mod: ,,, | Performed by: HOSPITALIST

## 2022-10-05 PROCEDURE — 63700000 PHARM REV CODE 250 ALT 637 W/O HCPCS: Performed by: INTERNAL MEDICINE

## 2022-10-05 PROCEDURE — 99900035 HC TECH TIME PER 15 MIN (STAT)

## 2022-10-05 PROCEDURE — 36415 COLL VENOUS BLD VENIPUNCTURE: CPT | Performed by: INTERNAL MEDICINE

## 2022-10-05 PROCEDURE — 11000001 HC ACUTE MED/SURG PRIVATE ROOM

## 2022-10-05 PROCEDURE — 94640 AIRWAY INHALATION TREATMENT: CPT

## 2022-10-05 PROCEDURE — 25000242 PHARM REV CODE 250 ALT 637 W/ HCPCS: Performed by: INTERNAL MEDICINE

## 2022-10-05 PROCEDURE — 25000003 PHARM REV CODE 250: Performed by: INTERNAL MEDICINE

## 2022-10-05 PROCEDURE — 99233 SBSQ HOSP IP/OBS HIGH 50: CPT | Mod: ,,, | Performed by: HOSPITALIST

## 2022-10-05 PROCEDURE — 27000221 HC OXYGEN, UP TO 24 HOURS

## 2022-10-05 PROCEDURE — 94761 N-INVAS EAR/PLS OXIMETRY MLT: CPT

## 2022-10-05 PROCEDURE — 63600175 PHARM REV CODE 636 W HCPCS: Performed by: HOSPITALIST

## 2022-10-05 PROCEDURE — 82962 GLUCOSE BLOOD TEST: CPT

## 2022-10-05 RX ORDER — LACTULOSE 10 G/15ML
15 SOLUTION ORAL 3 TIMES DAILY
Status: DISCONTINUED | OUTPATIENT
Start: 2022-10-05 | End: 2022-10-07 | Stop reason: HOSPADM

## 2022-10-05 RX ORDER — BISACODYL 5 MG
10 TABLET, DELAYED RELEASE (ENTERIC COATED) ORAL DAILY PRN
Status: DISCONTINUED | OUTPATIENT
Start: 2022-10-05 | End: 2022-10-07 | Stop reason: HOSPADM

## 2022-10-05 RX ORDER — METHYLPREDNISOLONE SOD SUCC 125 MG
125 VIAL (EA) INJECTION EVERY 6 HOURS
Status: DISCONTINUED | OUTPATIENT
Start: 2022-10-05 | End: 2022-10-06

## 2022-10-05 RX ADMIN — LACTULOSE 15 G: 20 SOLUTION ORAL at 08:10

## 2022-10-05 RX ADMIN — METHYLPREDNISOLONE SODIUM SUCCINATE 125 MG: 125 INJECTION, POWDER, FOR SOLUTION INTRAMUSCULAR; INTRAVENOUS at 06:10

## 2022-10-05 RX ADMIN — AMLODIPINE BESYLATE 10 MG: 10 TABLET ORAL at 08:10

## 2022-10-05 RX ADMIN — AZITHROMYCIN MONOHYDRATE 250 MG: 250 TABLET ORAL at 08:10

## 2022-10-05 RX ADMIN — IPRATROPIUM BROMIDE AND ALBUTEROL SULFATE 3 ML: 2.5; .5 SOLUTION RESPIRATORY (INHALATION) at 12:10

## 2022-10-05 RX ADMIN — ASPIRIN 81 MG CHEWABLE TABLET 81 MG: 81 TABLET CHEWABLE at 08:10

## 2022-10-05 RX ADMIN — FUROSEMIDE 60 MG: 10 INJECTION, SOLUTION INTRAMUSCULAR; INTRAVENOUS at 04:10

## 2022-10-05 RX ADMIN — SPIRONOLACTONE 25 MG: 25 TABLET ORAL at 08:10

## 2022-10-05 RX ADMIN — SACUBITRIL AND VALSARTAN 1 TABLET: 24; 26 TABLET, FILM COATED ORAL at 08:10

## 2022-10-05 RX ADMIN — BISACODYL 10 MG: 5 TABLET, COATED ORAL at 04:10

## 2022-10-05 RX ADMIN — IPRATROPIUM BROMIDE AND ALBUTEROL SULFATE 3 ML: 2.5; .5 SOLUTION RESPIRATORY (INHALATION) at 07:10

## 2022-10-05 RX ADMIN — INSULIN ASPART 5 UNITS: 100 INJECTION, SOLUTION INTRAVENOUS; SUBCUTANEOUS at 08:10

## 2022-10-05 RX ADMIN — CARVEDILOL 12.5 MG: 6.25 TABLET, FILM COATED ORAL at 08:10

## 2022-10-05 RX ADMIN — FUROSEMIDE 60 MG: 10 INJECTION, SOLUTION INTRAMUSCULAR; INTRAVENOUS at 05:10

## 2022-10-05 RX ADMIN — METHYLPREDNISOLONE SODIUM SUCCINATE 40 MG: 40 INJECTION, POWDER, FOR SOLUTION INTRAMUSCULAR; INTRAVENOUS at 05:10

## 2022-10-05 RX ADMIN — ENOXAPARIN SODIUM 40 MG: 40 INJECTION SUBCUTANEOUS at 04:10

## 2022-10-05 RX ADMIN — COLCHICINE 0.6 MG: 0.6 TABLET, FILM COATED ORAL at 08:10

## 2022-10-05 RX ADMIN — IPRATROPIUM BROMIDE AND ALBUTEROL SULFATE 3 ML: 2.5; .5 SOLUTION RESPIRATORY (INHALATION) at 01:10

## 2022-10-05 RX ADMIN — ATORVASTATIN CALCIUM 40 MG: 40 TABLET, FILM COATED ORAL at 08:10

## 2022-10-05 RX ADMIN — INSULIN ASPART 6 UNITS: 100 INJECTION, SOLUTION INTRAVENOUS; SUBCUTANEOUS at 05:10

## 2022-10-05 RX ADMIN — INSULIN ASPART 4 UNITS: 100 INJECTION, SOLUTION INTRAVENOUS; SUBCUTANEOUS at 04:10

## 2022-10-05 RX ADMIN — LACTULOSE 15 G: 20 SOLUTION ORAL at 02:10

## 2022-10-05 RX ADMIN — INSULIN ASPART 8 UNITS: 100 INJECTION, SOLUTION INTRAVENOUS; SUBCUTANEOUS at 12:10

## 2022-10-05 RX ADMIN — ALLOPURINOL 100 MG: 100 TABLET ORAL at 08:10

## 2022-10-05 RX ADMIN — CARVEDILOL 12.5 MG: 6.25 TABLET, FILM COATED ORAL at 04:10

## 2022-10-05 RX ADMIN — INSULIN DETEMIR 30 UNITS: 100 INJECTION, SOLUTION SUBCUTANEOUS at 08:10

## 2022-10-05 RX ADMIN — ONDANSETRON 4 MG: 2 INJECTION INTRAMUSCULAR; INTRAVENOUS at 01:10

## 2022-10-05 NOTE — ASSESSMENT & PLAN NOTE
Likely due to pulmonary edema from decompensated heart failure.  Will start patient on Zithromax for its immunomodulatory properties along with IV steroid and scheduled/PRN neb treatments    .

## 2022-10-05 NOTE — SUBJECTIVE & OBJECTIVE
Interval History:     Review of Systems   Constitutional:  Negative for chills, diaphoresis, fatigue and fever.   HENT:  Negative for congestion, hearing loss, nosebleeds, postnasal drip, sore throat, tinnitus and trouble swallowing.    Eyes:  Negative for photophobia, pain, discharge, itching and visual disturbance.   Respiratory:  Positive for cough, shortness of breath and wheezing. Negative for stridor.    Cardiovascular:  Positive for leg swelling. Negative for chest pain and palpitations.   Gastrointestinal:  Positive for abdominal distention. Negative for abdominal pain, anal bleeding, blood in stool, constipation, diarrhea, nausea and vomiting.   Endocrine: Negative for cold intolerance, heat intolerance, polydipsia, polyphagia and polyuria.   Genitourinary:  Negative for decreased urine volume, difficulty urinating, dysuria, flank pain, frequency, hematuria and urgency.   Musculoskeletal:  Negative for arthralgias, back pain, gait problem, joint swelling, myalgias, neck pain and neck stiffness.   Skin:  Negative for color change, pallor and rash.   Allergic/Immunologic: Negative for immunocompromised state.   Neurological:  Negative for dizziness, tremors, seizures, syncope, facial asymmetry, speech difficulty, weakness, light-headedness, numbness and headaches.   Hematological:  Negative for adenopathy. Does not bruise/bleed easily.   Objective:     Vital Signs (Most Recent):  Temp: 98.2 °F (36.8 °C) (10/04/22 1940)  Pulse: 100 (10/04/22 1940)  Resp: 18 (10/04/22 1940)  BP: (!) 146/63 (10/04/22 1940)  SpO2: 96 % (10/04/22 1940)   Vital Signs (24h Range):  Temp:  [96.6 °F (35.9 °C)-98.3 °F (36.8 °C)] 98.2 °F (36.8 °C)  Pulse:  [] 100  Resp:  [18-24] 18  SpO2:  [88 %-100 %] 96 %  BP: (120-166)/(55-93) 146/63     Weight: (!) 159.8 kg (352 lb 4.7 oz)  Body mass index is 46.48 kg/m².    Intake/Output Summary (Last 24 hours) at 10/4/2022 4681  Last data filed at 10/4/2022 2147  Gross per 24 hour   Intake  240 ml   Output 6675 ml   Net -6435 ml      Physical Exam  Vitals reviewed.   Constitutional:       General: He is not in acute distress.     Appearance: Normal appearance.   HENT:      Head: Normocephalic and atraumatic.      Right Ear: External ear normal.      Left Ear: External ear normal.   Eyes:      Extraocular Movements: Extraocular movements intact.      Pupils: Pupils are equal, round, and reactive to light.   Cardiovascular:      Rate and Rhythm: Normal rate and regular rhythm.      Pulses: Normal pulses.      Heart sounds: Normal heart sounds. No murmur heard.  Pulmonary:      Effort: Pulmonary effort is normal. No respiratory distress.      Breath sounds: Wheezing and rhonchi present.   Chest:      Chest wall: No tenderness.   Abdominal:      General: There is distension.      Palpations: There is no mass.      Tenderness: There is no abdominal tenderness. There is no right CVA tenderness or left CVA tenderness.   Genitourinary:     Comments: Mild scrotal edema present  Musculoskeletal:         General: No swelling or tenderness. Normal range of motion.      Right lower leg: Edema present.      Left lower leg: Edema present.   Skin:     General: Skin is warm and dry.      Capillary Refill: Capillary refill takes less than 2 seconds.   Neurological:      General: No focal deficit present.      Mental Status: He is alert and oriented to person, place, and time. Mental status is at baseline.   Psychiatric:         Mood and Affect: Mood normal.         Thought Content: Thought content normal.       Significant Labs: All pertinent labs within the past 24 hours have been reviewed.    Significant Imaging: I have reviewed all pertinent imaging results/findings within the past 24 hours.

## 2022-10-05 NOTE — PLAN OF CARE
Problem: Gas Exchange Impaired  Goal: Optimal Gas Exchange  10/5/2022 1553 by Melissa Perez, RRT  Outcome: Ongoing, Progressing  10/5/2022 1405 by Melissa Perez, RRT  Outcome: Ongoing, Progressing

## 2022-10-05 NOTE — ASSESSMENT & PLAN NOTE
Patient has a history of unspecified heart failure but details are not available for my review at this time.  Will proceed with echocardiogram in a.m. In the meantime, patient will be diuresed with loop diuretic, MRA, reduced dose of beta-blocker and start Entresto.  SGLT2i was not available at inpatient pharmacy.    Results for orders placed during the hospital encounter of 10/03/22    Echo    Interpretation Summary  · The left ventricle is normal in size with normal systolic function.  · The estimated ejection fraction is 65%.  · Normal left ventricular diastolic function.  · Moderate right ventricular enlargement.  · Moderate right atrial enlargement.  · Moderate mitral regurgitation.  · Mild tricuspid regurgitation.  · Elevated central venous pressure (15 mmHg).  · The estimated PA systolic pressure is 46 mmHg.  · There is pulmonary hypertension.  · Trivial pericardial effusion.

## 2022-10-05 NOTE — PROGRESS NOTES
[facility] Hospital Medicine  Progress Note    Patient Name: Abilio Carroll  MRN: [id number]  Patient Class: IP- Inpatient   Admission Date: 10/3/2022  Length of Stay: 1 days  Attending Physician: Yara Wells MD  Primary Care Provider: JOHNNY Bird        Subjective:     Principal Problem:Acute on chronic heart failure    HPI:  Patient is a 62-year-old morbidly obese male with TERI on CPAP, type 2 diabetes, essential hypertension, generalized osteoarthritis, unspecified congestive heart failure and COPD on supplemental oxygen who presents to the emergency room complaining of shortness of breath accompanied by PND, orthopnea, abdominal distension, scrotal and lower extremity edema.  Patient endorses cough with productive of white phlegm and wheezing, but otherwise denied any other associated symptoms such as fever chills hemoptysis chest pain or palpitation.  Patient is said to be compliant with medication as prescribed but continue to experience progressive worsening of aforementioned symptoms prompting ED visit this evening.  On presentation, vital signs were stable and patient was afebrile.  Physical examination was notable for diffuse rhonchi, abdominal distension, scrotal and bilateral lower extremity edema.  Workup in ED was notable for mildly elevated BNP level with chest x-ray demonstrating cardiomegaly with CHF changes, but otherwise unremarkable including EKG and troponin level.  Patient will be admitted for further evaluation and intervention      Overview/Hospital Course:  10/4: concern for heart failure on admission and patient placed on lasix.  Plan for echo this morning to further investigate cause of symptoms.       Interval History:     Review of Systems   Constitutional:  Negative for chills, diaphoresis, fatigue and fever.   HENT:  Negative for congestion, hearing loss, nosebleeds, postnasal drip, sore throat, tinnitus and trouble swallowing.     Eyes:  Negative for photophobia, pain, discharge, itching and visual disturbance.   Respiratory:  Positive for cough, shortness of breath and wheezing. Negative for stridor.    Cardiovascular:  Positive for leg swelling. Negative for chest pain and palpitations.   Gastrointestinal:  Positive for abdominal distention. Negative for abdominal pain, anal bleeding, blood in stool, constipation, diarrhea, nausea and vomiting.   Endocrine: Negative for cold intolerance, heat intolerance, polydipsia, polyphagia and polyuria.   Genitourinary:  Negative for decreased urine volume, difficulty urinating, dysuria, flank pain, frequency, hematuria and urgency.   Musculoskeletal:  Negative for arthralgias, back pain, gait problem, joint swelling, myalgias, neck pain and neck stiffness.   Skin:  Negative for color change, pallor and rash.   Allergic/Immunologic: Negative for immunocompromised state.   Neurological:  Negative for dizziness, tremors, seizures, syncope, facial asymmetry, speech difficulty, weakness, light-headedness, numbness and headaches.   Hematological:  Negative for adenopathy. Does not bruise/bleed easily.   Objective:     Vital Signs (Most Recent):  Temp: 98.2 °F (36.8 °C) (10/04/22 1940)  Pulse: 100 (10/04/22 1940)  Resp: 18 (10/04/22 1940)  BP: (!) 146/63 (10/04/22 1940)  SpO2: 96 % (10/04/22 1940)   Vital Signs (24h Range):  Temp:  [96.6 °F (35.9 °C)-98.3 °F (36.8 °C)] 98.2 °F (36.8 °C)  Pulse:  [] 100  Resp:  [18-24] 18  SpO2:  [88 %-100 %] 96 %  BP: (120-166)/(55-93) 146/63     Weight: (!) 159.8 kg (352 lb 4.7 oz)  Body mass index is 46.48 kg/m².    Intake/Output Summary (Last 24 hours) at 10/4/2022 2324  Last data filed at 10/4/2022 2141  Gross per 24 hour   Intake 240 ml   Output 6675 ml   Net -6435 ml      Physical Exam  Vitals reviewed.   Constitutional:       General: He is not in acute distress.     Appearance: Normal appearance.   HENT:      Head: Normocephalic and atraumatic.      Right  Ear: External ear normal.      Left Ear: External ear normal.   Eyes:      Extraocular Movements: Extraocular movements intact.      Pupils: Pupils are equal, round, and reactive to light.   Cardiovascular:      Rate and Rhythm: Normal rate and regular rhythm.      Pulses: Normal pulses.      Heart sounds: Normal heart sounds. No murmur heard.  Pulmonary:      Effort: Pulmonary effort is normal. No respiratory distress.      Breath sounds: Wheezing and rhonchi present.   Chest:      Chest wall: No tenderness.   Abdominal:      General: There is distension.      Palpations: There is no mass.      Tenderness: There is no abdominal tenderness. There is no right CVA tenderness or left CVA tenderness.   Genitourinary:     Comments: Mild scrotal edema present  Musculoskeletal:         General: No swelling or tenderness. Normal range of motion.      Right lower leg: Edema present.      Left lower leg: Edema present.   Skin:     General: Skin is warm and dry.      Capillary Refill: Capillary refill takes less than 2 seconds.   Neurological:      General: No focal deficit present.      Mental Status: He is alert and oriented to person, place, and time. Mental status is at baseline.   Psychiatric:         Mood and Affect: Mood normal.         Thought Content: Thought content normal.       Significant Labs: All pertinent labs within the past 24 hours have been reviewed.    Significant Imaging: I have reviewed all pertinent imaging results/findings within the past 24 hours.      Assessment/Plan:      * Acute on chronic heart failure    Patient has a history of unspecified heart failure but details are not available for my review at this time.  Will proceed with echocardiogram in a.m. In the meantime, patient will be diuresed with loop diuretic, MRA, reduced dose of beta-blocker and start Entresto.  SGLT2i was not available at inpatient pharmacy.    Results for orders placed during the hospital encounter of  10/03/22    Echo    Interpretation Summary  · The left ventricle is normal in size with normal systolic function.  · The estimated ejection fraction is 65%.  · Normal left ventricular diastolic function.  · Moderate right ventricular enlargement.  · Moderate right atrial enlargement.  · Moderate mitral regurgitation.  · Mild tricuspid regurgitation.  · Elevated central venous pressure (15 mmHg).  · The estimated PA systolic pressure is 46 mmHg.  · There is pulmonary hypertension.  · Trivial pericardial effusion.      Acute exacerbation of chronic obstructive pulmonary disease (COPD)    Likely due to pulmonary edema from decompensated heart failure.  Will start patient on Zithromax for its immunomodulatory properties along with IV steroid and scheduled/PRN neb treatments    .    Stage 2 chronic kidney disease    Likely due to combination of hypertension, chronic heart failure, and type 2 diabetes  Will need to hold off on nonsteroidals for time being      Uncontrolled type 2 diabetes mellitus with hyperglycemia    Continue Levemir and will start patient on sliding scale insulin to maintain CBG in the range of 130-180s    Essential hypertension    Continue present management      Chronic idiopathic gout of multiple sites    Continue present management consisting of allopurinol and colchicine        VTE Risk Mitigation (From admission, onward)         Ordered     enoxaparin injection 40 mg  Daily         10/03/22 2224     Place sequential compression device  Until discontinued         10/03/22 2224     IP VTE HIGH RISK PATIENT  Once         10/03/22 2224                Discharge Planning   TONG:      Code Status: Full Code   Is the patient medically ready for discharge?:     Reason for patient still in hospital (select all that apply): Treatment  Discharge Plan A: Home with family                  Yara Wells MD  Department of Hospital Medicine   [facility]

## 2022-10-06 LAB
ANION GAP SERPL CALCULATED.3IONS-SCNC: 6 MMOL/L (ref 7–16)
ANISOCYTOSIS BLD QL SMEAR: ABNORMAL
BASOPHILS # BLD AUTO: 0.01 K/UL (ref 0–0.2)
BASOPHILS NFR BLD AUTO: 0.1 % (ref 0–1)
BUN SERPL-MCNC: 24 MG/DL (ref 7–18)
BUN/CREAT SERPL: 27 (ref 6–20)
CALCIUM SERPL-MCNC: 9.2 MG/DL (ref 8.5–10.1)
CHLORIDE SERPL-SCNC: 94 MMOL/L (ref 98–107)
CO2 SERPL-SCNC: 37 MMOL/L (ref 21–32)
CREAT SERPL-MCNC: 0.9 MG/DL (ref 0.7–1.3)
DIFFERENTIAL METHOD BLD: ABNORMAL
EGFR (NO RACE VARIABLE) (RUSH/TITUS): 98 ML/MIN/1.73M²
EOSINOPHIL # BLD AUTO: 0 K/UL (ref 0–0.5)
EOSINOPHIL NFR BLD AUTO: 0 % (ref 1–4)
ERYTHROCYTE [DISTWIDTH] IN BLOOD BY AUTOMATED COUNT: 17.2 % (ref 11.5–14.5)
GLUCOSE SERPL-MCNC: 280 MG/DL (ref 74–106)
GLUCOSE SERPL-MCNC: 287 MG/DL (ref 70–105)
GLUCOSE SERPL-MCNC: 335 MG/DL (ref 70–105)
GLUCOSE SERPL-MCNC: 375 MG/DL (ref 74–106)
GLUCOSE SERPL-MCNC: 391 MG/DL (ref 70–105)
GLUCOSE SERPL-MCNC: 397 MG/DL (ref 70–105)
GLUCOSE SERPL-MCNC: 407 MG/DL (ref 70–105)
HCT VFR BLD AUTO: 36.4 % (ref 40–54)
HGB BLD-MCNC: 10.2 G/DL (ref 13.5–18)
HYPOCHROMIA BLD QL SMEAR: ABNORMAL
IMM GRANULOCYTES # BLD AUTO: 0.07 K/UL (ref 0–0.04)
IMM GRANULOCYTES NFR BLD: 0.6 % (ref 0–0.4)
LYMPHOCYTES # BLD AUTO: 0.31 K/UL (ref 1–4.8)
LYMPHOCYTES NFR BLD AUTO: 2.5 % (ref 27–41)
LYMPHOCYTES NFR BLD MANUAL: 3 % (ref 27–41)
MCH RBC QN AUTO: 26.2 PG (ref 27–31)
MCHC RBC AUTO-ENTMCNC: 28 G/DL (ref 32–36)
MCV RBC AUTO: 93.6 FL (ref 80–96)
MONOCYTES # BLD AUTO: 0.2 K/UL (ref 0–0.8)
MONOCYTES NFR BLD AUTO: 1.6 % (ref 2–6)
MONOCYTES NFR BLD MANUAL: 3 % (ref 2–6)
MPC BLD CALC-MCNC: 9.6 FL (ref 9.4–12.4)
NEUTROPHILS # BLD AUTO: 12.03 K/UL (ref 1.8–7.7)
NEUTROPHILS NFR BLD AUTO: 95.2 % (ref 53–65)
NEUTS SEG NFR BLD MANUAL: 94 % (ref 50–62)
NRBC # BLD AUTO: 0.02 X10E3/UL
NRBC, AUTO (.00): 0.2 %
OVALOCYTES BLD QL SMEAR: ABNORMAL
PLATELET # BLD AUTO: 307 K/UL (ref 150–400)
PLATELET MORPHOLOGY: ABNORMAL
POLYCHROMASIA BLD QL SMEAR: ABNORMAL
POTASSIUM SERPL-SCNC: 4.6 MMOL/L (ref 3.5–5.1)
RBC # BLD AUTO: 3.89 M/UL (ref 4.6–6.2)
SODIUM SERPL-SCNC: 132 MMOL/L (ref 136–145)
STOMATOCYTES BLD QL SMEAR: ABNORMAL
WBC # BLD AUTO: 12.62 K/UL (ref 4.5–11)

## 2022-10-06 PROCEDURE — 11000001 HC ACUTE MED/SURG PRIVATE ROOM

## 2022-10-06 PROCEDURE — 94761 N-INVAS EAR/PLS OXIMETRY MLT: CPT

## 2022-10-06 PROCEDURE — 36415 COLL VENOUS BLD VENIPUNCTURE: CPT | Performed by: NURSE PRACTITIONER

## 2022-10-06 PROCEDURE — 63600175 PHARM REV CODE 636 W HCPCS: Performed by: HOSPITALIST

## 2022-10-06 PROCEDURE — 94640 AIRWAY INHALATION TREATMENT: CPT

## 2022-10-06 PROCEDURE — 63700000 PHARM REV CODE 250 ALT 637 W/O HCPCS: Performed by: INTERNAL MEDICINE

## 2022-10-06 PROCEDURE — 99233 SBSQ HOSP IP/OBS HIGH 50: CPT | Mod: ,,, | Performed by: HOSPITALIST

## 2022-10-06 PROCEDURE — 25000003 PHARM REV CODE 250: Performed by: INTERNAL MEDICINE

## 2022-10-06 PROCEDURE — 99233 PR SUBSEQUENT HOSPITAL CARE,LEVL III: ICD-10-PCS | Mod: ,,, | Performed by: HOSPITALIST

## 2022-10-06 PROCEDURE — 85025 COMPLETE CBC W/AUTO DIFF WBC: CPT | Performed by: NURSE PRACTITIONER

## 2022-10-06 PROCEDURE — 27000221 HC OXYGEN, UP TO 24 HOURS

## 2022-10-06 PROCEDURE — 82947 ASSAY GLUCOSE BLOOD QUANT: CPT | Performed by: NURSE PRACTITIONER

## 2022-10-06 PROCEDURE — 99900035 HC TECH TIME PER 15 MIN (STAT)

## 2022-10-06 PROCEDURE — 82962 GLUCOSE BLOOD TEST: CPT

## 2022-10-06 PROCEDURE — 25000242 PHARM REV CODE 250 ALT 637 W/ HCPCS: Performed by: INTERNAL MEDICINE

## 2022-10-06 PROCEDURE — 80048 BASIC METABOLIC PNL TOTAL CA: CPT | Performed by: INTERNAL MEDICINE

## 2022-10-06 PROCEDURE — 63600175 PHARM REV CODE 636 W HCPCS: Performed by: INTERNAL MEDICINE

## 2022-10-06 PROCEDURE — 36415 COLL VENOUS BLD VENIPUNCTURE: CPT | Performed by: INTERNAL MEDICINE

## 2022-10-06 RX ORDER — AZITHROMYCIN 250 MG/1
250 TABLET, FILM COATED ORAL DAILY
Qty: 2 TABLET | Refills: 0 | Status: SHIPPED | OUTPATIENT
Start: 2022-10-07 | End: 2022-10-08

## 2022-10-06 RX ORDER — CARVEDILOL 12.5 MG/1
12.5 TABLET ORAL 2 TIMES DAILY WITH MEALS
Qty: 60 TABLET | Refills: 0 | Status: ON HOLD | OUTPATIENT
Start: 2022-10-06 | End: 2023-08-14 | Stop reason: HOSPADM

## 2022-10-06 RX ORDER — INDOMETHACIN 50 MG/1
CAPSULE ORAL
Qty: 30 CAPSULE | Refills: 3
Start: 2022-10-06

## 2022-10-06 RX ORDER — PREDNISONE 20 MG/1
TABLET ORAL
Qty: 6 TABLET | Refills: 0 | Status: SHIPPED | OUTPATIENT
Start: 2022-10-06 | End: 2022-10-11

## 2022-10-06 RX ORDER — PREDNISONE 20 MG/1
40 TABLET ORAL DAILY
Status: DISCONTINUED | OUTPATIENT
Start: 2022-10-07 | End: 2022-10-07 | Stop reason: HOSPADM

## 2022-10-06 RX ORDER — FUROSEMIDE 40 MG/1
40 TABLET ORAL 2 TIMES DAILY
Qty: 60 TABLET | Refills: 0 | Status: SHIPPED | OUTPATIENT
Start: 2022-10-06 | End: 2022-11-05

## 2022-10-06 RX ORDER — IPRATROPIUM BROMIDE AND ALBUTEROL SULFATE 2.5; .5 MG/3ML; MG/3ML
SOLUTION RESPIRATORY (INHALATION)
Qty: 6 ML | Refills: 0 | Status: SHIPPED | OUTPATIENT
Start: 2022-10-06

## 2022-10-06 RX ADMIN — SPIRONOLACTONE 25 MG: 25 TABLET ORAL at 09:10

## 2022-10-06 RX ADMIN — IPRATROPIUM BROMIDE AND ALBUTEROL SULFATE 3 ML: 2.5; .5 SOLUTION RESPIRATORY (INHALATION) at 07:10

## 2022-10-06 RX ADMIN — ASPIRIN 81 MG CHEWABLE TABLET 81 MG: 81 TABLET CHEWABLE at 09:10

## 2022-10-06 RX ADMIN — ENOXAPARIN SODIUM 40 MG: 40 INJECTION SUBCUTANEOUS at 05:10

## 2022-10-06 RX ADMIN — AMLODIPINE BESYLATE 10 MG: 10 TABLET ORAL at 09:10

## 2022-10-06 RX ADMIN — INSULIN ASPART 10 UNITS: 100 INJECTION, SOLUTION INTRAVENOUS; SUBCUTANEOUS at 05:10

## 2022-10-06 RX ADMIN — FUROSEMIDE 60 MG: 10 INJECTION, SOLUTION INTRAMUSCULAR; INTRAVENOUS at 03:10

## 2022-10-06 RX ADMIN — AZITHROMYCIN MONOHYDRATE 250 MG: 250 TABLET ORAL at 09:10

## 2022-10-06 RX ADMIN — IPRATROPIUM BROMIDE AND ALBUTEROL SULFATE 3 ML: 2.5; .5 SOLUTION RESPIRATORY (INHALATION) at 12:10

## 2022-10-06 RX ADMIN — SACUBITRIL AND VALSARTAN 1 TABLET: 24; 26 TABLET, FILM COATED ORAL at 09:10

## 2022-10-06 RX ADMIN — METHYLPREDNISOLONE SODIUM SUCCINATE 125 MG: 125 INJECTION, POWDER, FOR SOLUTION INTRAMUSCULAR; INTRAVENOUS at 12:10

## 2022-10-06 RX ADMIN — COLCHICINE 0.6 MG: 0.6 TABLET, FILM COATED ORAL at 09:10

## 2022-10-06 RX ADMIN — ALLOPURINOL 100 MG: 100 TABLET ORAL at 09:10

## 2022-10-06 RX ADMIN — INSULIN ASPART 10 UNITS: 100 INJECTION, SOLUTION INTRAVENOUS; SUBCUTANEOUS at 03:10

## 2022-10-06 RX ADMIN — ATORVASTATIN CALCIUM 40 MG: 40 TABLET, FILM COATED ORAL at 09:10

## 2022-10-06 RX ADMIN — METHYLPREDNISOLONE SODIUM SUCCINATE 125 MG: 125 INJECTION, POWDER, FOR SOLUTION INTRAMUSCULAR; INTRAVENOUS at 05:10

## 2022-10-06 RX ADMIN — CARVEDILOL 12.5 MG: 6.25 TABLET, FILM COATED ORAL at 05:10

## 2022-10-06 RX ADMIN — CARVEDILOL 12.5 MG: 6.25 TABLET, FILM COATED ORAL at 09:10

## 2022-10-06 RX ADMIN — FUROSEMIDE 60 MG: 10 INJECTION, SOLUTION INTRAMUSCULAR; INTRAVENOUS at 05:10

## 2022-10-06 RX ADMIN — INSULIN DETEMIR 30 UNITS: 100 INJECTION, SOLUTION SUBCUTANEOUS at 09:10

## 2022-10-06 RX ADMIN — INSULIN ASPART 3 UNITS: 100 INJECTION, SOLUTION INTRAVENOUS; SUBCUTANEOUS at 09:10

## 2022-10-06 NOTE — PLAN OF CARE
[facility]  Discharge Final Note    Primary Care Provider: Brandt Rucker MD    Expected Discharge Date: 10/6/2022    Final Discharge Note (most recent)       Final Note - 10/06/22 0929          Final Note    Assessment Type Final Discharge Note     Anticipated Discharge Disposition Home or Self Care     What phone number can be called within the next 1-3 days to see how you are doing after discharge? [phone]        Post-Acute Status    Discharge Delays None known at this time                   Patient discharging home today. SS received consult for portable oxygen concentrator. SS spoke with patient, he already has a home oxygen concentrator and oxygen tanks through [employer], but would like a portable concentrator instead of the tanks. SS spoke with Lizzeth at [facility], she states patient's insurance does not cover a portable oxygen concentrator and patient would have to pay out of pocket. SS discussed with patient, he is unable to afford to pay out of pocket. SS notified Dr. Wells.     Important Message from Medicare  Important Message from Medicare regarding Discharge Appeal Rights: Given to patient/caregiver, Explained to patient/caregiver, Signed/date by patient/caregiver     Date IMM was signed: 10/06/22  Time IMM was signed: 0929    Contact Info       ARIELA Pope   Specialty: Cardiology    [address]  [facility] [address]   Phone: [phone]       Next Steps: Schedule an appointment as soon as possible for a visit in 3 week(s)    Instructions: Hospital discharge follow up; Establish care for HF    Brandt Rucker MD   Specialty: Family Medicine   Relationship: PCP - General    [id number] [address] [place] [address]   Phone: [phone]       Next Steps: Schedule an appointment as soon as possible for a visit in 1 week(s)    Instructions: Hospital discharge acute on chronic heart failure    JOHNNY Baltazar    Specialty: Emergency Medicine, Gastroenterology    [address] [place] [address]  [place] [address]   Phone: [phone]       Next Steps: Schedule an appointment as soon as possible for a visit    Instructions: Hospital discharge; Hemorrhoids, needs colonoscopy screening

## 2022-10-06 NOTE — DISCHARGE INSTRUCTIONS
Please make sure that you keep a log of your blood glucose checks and take them with you to your appt with Dr. Rucker.

## 2022-10-06 NOTE — SUBJECTIVE & OBJECTIVE
Interval History:     Review of Systems   Constitutional:  Negative for chills, diaphoresis, fatigue and fever.   HENT:  Negative for congestion, hearing loss, nosebleeds, postnasal drip, sore throat, tinnitus and trouble swallowing.    Eyes:  Negative for photophobia, pain, discharge, itching and visual disturbance.   Respiratory:  Positive for cough, shortness of breath and wheezing. Negative for stridor.    Cardiovascular:  Positive for leg swelling. Negative for chest pain and palpitations.   Gastrointestinal:  Positive for abdominal distention. Negative for abdominal pain, anal bleeding, blood in stool, constipation, diarrhea, nausea and vomiting.   Endocrine: Negative for cold intolerance, heat intolerance, polydipsia, polyphagia and polyuria.   Genitourinary:  Negative for decreased urine volume, difficulty urinating, dysuria, flank pain, frequency, hematuria and urgency.   Musculoskeletal:  Negative for arthralgias, back pain, gait problem, joint swelling, myalgias, neck pain and neck stiffness.   Skin:  Negative for color change, pallor and rash.   Allergic/Immunologic: Negative for immunocompromised state.   Neurological:  Negative for dizziness, tremors, seizures, syncope, facial asymmetry, speech difficulty, weakness, light-headedness, numbness and headaches.   Hematological:  Negative for adenopathy. Does not bruise/bleed easily.   Objective:     Vital Signs (Most Recent):  Temp: 97.9 °F (36.6 °C) (10/05/22 1500)  Pulse: 96 (10/05/22 1500)  Resp: 19 (10/05/22 1500)  BP: (!) 127/46 (10/05/22 1500)  SpO2: 95 % (10/05/22 1500)   Vital Signs (24h Range):  Temp:  [97.9 °F (36.6 °C)-98.6 °F (37 °C)] 97.9 °F (36.6 °C)  Pulse:  [86-99] 96  Resp:  [18-20] 19  SpO2:  [88 %-98 %] 95 %  BP: (118-135)/(46-67) 127/46     Weight: (!) 159.8 kg (352 lb 4.7 oz)  Body mass index is 46.48 kg/m².    Intake/Output Summary (Last 24 hours) at 10/5/2022 1946  Last data filed at 10/5/2022 1415  Gross per 24 hour   Intake --    Output 5450 ml   Net -5450 ml      Physical Exam  Vitals reviewed.   Constitutional:       General: He is not in acute distress.     Appearance: Normal appearance.   HENT:      Head: Normocephalic and atraumatic.      Right Ear: External ear normal.      Left Ear: External ear normal.   Eyes:      Extraocular Movements: Extraocular movements intact.      Pupils: Pupils are equal, round, and reactive to light.   Cardiovascular:      Rate and Rhythm: Normal rate and regular rhythm.      Pulses: Normal pulses.      Heart sounds: Normal heart sounds. No murmur heard.  Pulmonary:      Effort: Pulmonary effort is normal. No respiratory distress.      Breath sounds: Wheezing and rhonchi present.   Chest:      Chest wall: No tenderness.   Abdominal:      General: There is distension.      Palpations: There is no mass.      Tenderness: There is no abdominal tenderness. There is no right CVA tenderness or left CVA tenderness.   Genitourinary:     Comments: Mild scrotal edema present  Musculoskeletal:         General: No swelling or tenderness. Normal range of motion.      Right lower leg: Edema present.      Left lower leg: Edema present.   Skin:     General: Skin is warm and dry.      Capillary Refill: Capillary refill takes less than 2 seconds.   Neurological:      General: No focal deficit present.      Mental Status: He is alert and oriented to person, place, and time. Mental status is at baseline.   Psychiatric:         Mood and Affect: Mood normal.         Thought Content: Thought content normal.       Significant Labs: All pertinent labs within the past 24 hours have been reviewed.    Significant Imaging: I have reviewed all pertinent imaging results/findings within the past 24 hours.

## 2022-10-06 NOTE — ASSESSMENT & PLAN NOTE
Patient has a history of unspecified heart failure but details are not available for my review at this time.  Will proceed with echocardiogram in a.m. In the meantime, patient will be diuresed with loop diuretic, MRA, reduced dose of beta-blocker and start Entresto.  SGLT2i was not available at inpatient pharmacy.    Diastolic heart failure.  Follow-up with cardiology as outpatient.    Results for orders placed during the hospital encounter of 10/03/22    Echo    Interpretation Summary  · The left ventricle is normal in size with normal systolic function.  · The estimated ejection fraction is 65%.  · Normal left ventricular diastolic function.  · Moderate right ventricular enlargement.  · Moderate right atrial enlargement.  · Moderate mitral regurgitation.  · Mild tricuspid regurgitation.  · Elevated central venous pressure (15 mmHg).  · The estimated PA systolic pressure is 46 mmHg.  · There is pulmonary hypertension.  · Trivial pericardial effusion.

## 2022-10-06 NOTE — PROGRESS NOTES
[facility] Hospital Medicine  Progress Note    Patient Name: Abilio Carroll  MRN: [id number]  Patient Class: IP- Inpatient   Admission Date: 10/3/2022  Length of Stay: 2 days  Attending Physician: Yara Wells MD  Primary Care Provider: JOHNNY Bird        Subjective:     Principal Problem:Acute on chronic heart failure    HPI:  Patient is a 62-year-old morbidly obese male with TERI on CPAP, type 2 diabetes, essential hypertension, generalized osteoarthritis, unspecified congestive heart failure and COPD on supplemental oxygen who presents to the emergency room complaining of shortness of breath accompanied by PND, orthopnea, abdominal distension, scrotal and lower extremity edema.  Patient endorses cough with productive of white phlegm and wheezing, but otherwise denied any other associated symptoms such as fever chills hemoptysis chest pain or palpitation.  Patient is said to be compliant with medication as prescribed but continue to experience progressive worsening of aforementioned symptoms prompting ED visit this evening.  On presentation, vital signs were stable and patient was afebrile.  Physical examination was notable for diffuse rhonchi, abdominal distension, scrotal and bilateral lower extremity edema.  Workup in ED was notable for mildly elevated BNP level with chest x-ray demonstrating cardiomegaly with CHF changes, but otherwise unremarkable including EKG and troponin level.  Patient will be admitted for further evaluation and intervention      Overview/Hospital Course:  10/4: concern for heart failure on admission and patient placed on lasix.  Plan for echo this morning to further investigate cause of symptoms.   10/5:  Shortness of breath is improving.  Patient complains of gout flare with swelling in right hand.  Patient already on IV steroids for gout flare.      Interval History:     Review of Systems   Constitutional:  Negative for chills,  diaphoresis, fatigue and fever.   HENT:  Negative for congestion, hearing loss, nosebleeds, postnasal drip, sore throat, tinnitus and trouble swallowing.    Eyes:  Negative for photophobia, pain, discharge, itching and visual disturbance.   Respiratory:  Positive for cough, shortness of breath and wheezing. Negative for stridor.    Cardiovascular:  Positive for leg swelling. Negative for chest pain and palpitations.   Gastrointestinal:  Positive for abdominal distention. Negative for abdominal pain, anal bleeding, blood in stool, constipation, diarrhea, nausea and vomiting.   Endocrine: Negative for cold intolerance, heat intolerance, polydipsia, polyphagia and polyuria.   Genitourinary:  Negative for decreased urine volume, difficulty urinating, dysuria, flank pain, frequency, hematuria and urgency.   Musculoskeletal:  Negative for arthralgias, back pain, gait problem, joint swelling, myalgias, neck pain and neck stiffness.   Skin:  Negative for color change, pallor and rash.   Allergic/Immunologic: Negative for immunocompromised state.   Neurological:  Negative for dizziness, tremors, seizures, syncope, facial asymmetry, speech difficulty, weakness, light-headedness, numbness and headaches.   Hematological:  Negative for adenopathy. Does not bruise/bleed easily.   Objective:     Vital Signs (Most Recent):  Temp: 97.9 °F (36.6 °C) (10/05/22 1500)  Pulse: 96 (10/05/22 1500)  Resp: 19 (10/05/22 1500)  BP: (!) 127/46 (10/05/22 1500)  SpO2: 95 % (10/05/22 1500)   Vital Signs (24h Range):  Temp:  [97.9 °F (36.6 °C)-98.6 °F (37 °C)] 97.9 °F (36.6 °C)  Pulse:  [86-99] 96  Resp:  [18-20] 19  SpO2:  [88 %-98 %] 95 %  BP: (118-135)/(46-67) 127/46     Weight: (!) 159.8 kg (352 lb 4.7 oz)  Body mass index is 46.48 kg/m².    Intake/Output Summary (Last 24 hours) at 10/5/2022 1946  Last data filed at 10/5/2022 1415  Gross per 24 hour   Intake --   Output 5450 ml   Net -5450 ml      Physical Exam  Vitals reviewed.    Constitutional:       General: He is not in acute distress.     Appearance: Normal appearance.   HENT:      Head: Normocephalic and atraumatic.      Right Ear: External ear normal.      Left Ear: External ear normal.   Eyes:      Extraocular Movements: Extraocular movements intact.      Pupils: Pupils are equal, round, and reactive to light.   Cardiovascular:      Rate and Rhythm: Normal rate and regular rhythm.      Pulses: Normal pulses.      Heart sounds: Normal heart sounds. No murmur heard.  Pulmonary:      Effort: Pulmonary effort is normal. No respiratory distress.      Breath sounds: Wheezing and rhonchi present.   Chest:      Chest wall: No tenderness.   Abdominal:      General: There is distension.      Palpations: There is no mass.      Tenderness: There is no abdominal tenderness. There is no right CVA tenderness or left CVA tenderness.   Genitourinary:     Comments: Mild scrotal edema present  Musculoskeletal:         General: No swelling or tenderness. Normal range of motion.      Right lower leg: Edema present.      Left lower leg: Edema present.   Skin:     General: Skin is warm and dry.      Capillary Refill: Capillary refill takes less than 2 seconds.   Neurological:      General: No focal deficit present.      Mental Status: He is alert and oriented to person, place, and time. Mental status is at baseline.   Psychiatric:         Mood and Affect: Mood normal.         Thought Content: Thought content normal.       Significant Labs: All pertinent labs within the past 24 hours have been reviewed.    Significant Imaging: I have reviewed all pertinent imaging results/findings within the past 24 hours.      Assessment/Plan:      * Acute on chronic heart failure    Patient has a history of unspecified heart failure but details are not available for my review at this time.  Will proceed with echocardiogram in a.m. In the meantime, patient will be diuresed with loop diuretic, MRA, reduced dose of  beta-blocker and start Entresto.  SGLT2i was not available at inpatient pharmacy.    Diastolic heart failure.  Follow-up with cardiology as outpatient.    Results for orders placed during the hospital encounter of 10/03/22    Echo    Interpretation Summary  · The left ventricle is normal in size with normal systolic function.  · The estimated ejection fraction is 65%.  · Normal left ventricular diastolic function.  · Moderate right ventricular enlargement.  · Moderate right atrial enlargement.  · Moderate mitral regurgitation.  · Mild tricuspid regurgitation.  · Elevated central venous pressure (15 mmHg).  · The estimated PA systolic pressure is 46 mmHg.  · There is pulmonary hypertension.  · Trivial pericardial effusion.      Acute exacerbation of chronic obstructive pulmonary disease (COPD)    Likely due to pulmonary edema from decompensated heart failure.  Will start patient on Zithromax for its immunomodulatory properties along with IV steroid and scheduled/PRN neb treatments    .    Severe obesity (BMI >= 40)  Body mass index is 46.48 kg/m². Morbid obesity complicates all aspects of disease management from diagnostic modalities to treatment. Weight loss encouraged and health benefits explained to patient.         Gout  Report of gout in right hand and several other joints.    Patient on IV steroids.  Follow-up uric acid.      Stage 2 chronic kidney disease    Likely due to combination of hypertension, chronic heart failure, and type 2 diabetes  Will need to hold off on nonsteroidals for time being      Uncontrolled type 2 diabetes mellitus with hyperglycemia    Continue Levemir and will start patient on sliding scale insulin to maintain CBG in the range of 130-180s    Essential hypertension    Continue present management      Chronic idiopathic gout of multiple sites    Continue present management consisting of allopurinol and colchicine        VTE Risk Mitigation (From admission, onward)         Ordered      enoxaparin injection 40 mg  Daily         10/03/22 2224     Place sequential compression device  Until discontinued         10/03/22 2224     IP VTE HIGH RISK PATIENT  Once         10/03/22 2224                Discharge Planning   TONG:      Code Status: Full Code   Is the patient medically ready for discharge?:     Reason for patient still in hospital (select all that apply): Treatment  Discharge Plan A: Home with family                  Yara Wells MD  Department of Hospital Medicine   [facility]

## 2022-10-06 NOTE — CARE UPDATE
"Notified by HERMINIO Madrid of patient's POCT glucose reading "HIGH", order placed for STAT random glucose, notified Dr. Wells. Will discontinue discharge orders at this time.   "

## 2022-10-06 NOTE — ASSESSMENT & PLAN NOTE
Report of gout in right hand and several other joints.    Patient on IV steroids.  Follow-up uric acid.

## 2022-10-07 VITALS
DIASTOLIC BLOOD PRESSURE: 62 MMHG | HEART RATE: 77 BPM | TEMPERATURE: 99 F | WEIGHT: 315 LBS | HEIGHT: 73 IN | BODY MASS INDEX: 41.75 KG/M2 | OXYGEN SATURATION: 94 % | RESPIRATION RATE: 18 BRPM | SYSTOLIC BLOOD PRESSURE: 125 MMHG

## 2022-10-07 PROBLEM — G47.30 SLEEP APNEA: Status: ACTIVE | Noted: 2022-10-07

## 2022-10-07 LAB
ANION GAP SERPL CALCULATED.3IONS-SCNC: 5 MMOL/L (ref 7–16)
BUN SERPL-MCNC: 27 MG/DL (ref 7–18)
BUN/CREAT SERPL: 33 (ref 6–20)
CALCIUM SERPL-MCNC: 9.3 MG/DL (ref 8.5–10.1)
CHLORIDE SERPL-SCNC: 93 MMOL/L (ref 98–107)
CO2 SERPL-SCNC: 42 MMOL/L (ref 21–32)
CREAT SERPL-MCNC: 0.83 MG/DL (ref 0.7–1.3)
D DIMER PPP FEU-MCNC: 1 ΜG/ML (ref 0–0.47)
EGFR (NO RACE VARIABLE) (RUSH/TITUS): 100 ML/MIN/1.73M²
GLUCOSE SERPL-MCNC: 168 MG/DL (ref 74–106)
GLUCOSE SERPL-MCNC: 169 MG/DL (ref 70–105)
GLUCOSE SERPL-MCNC: 169 MG/DL (ref 70–105)
GLUCOSE SERPL-MCNC: 173 MG/DL (ref 70–105)
GLUCOSE SERPL-MCNC: 279 MG/DL (ref 70–105)
HCO3 UR-SCNC: 49.6 MMOL/L (ref 21–28)
PCO2 BLDA: 73 MMHG (ref 35–48)
PH SMN: 7.44 [PH] (ref 7.35–7.45)
PO2 BLDA: 67 MMHG (ref 83–108)
POC BASE EXCESS: 21.3 MMOL/L (ref -2–3)
POC SATURATED O2: 94 % (ref 95–98)
POTASSIUM SERPL-SCNC: 4.2 MMOL/L (ref 3.5–5.1)
SODIUM SERPL-SCNC: 136 MMOL/L (ref 136–145)

## 2022-10-07 PROCEDURE — 99239 HOSP IP/OBS DSCHRG MGMT >30: CPT | Mod: ,,, | Performed by: HOSPITALIST

## 2022-10-07 PROCEDURE — 94640 AIRWAY INHALATION TREATMENT: CPT

## 2022-10-07 PROCEDURE — 25000242 PHARM REV CODE 250 ALT 637 W/ HCPCS: Performed by: INTERNAL MEDICINE

## 2022-10-07 PROCEDURE — 85378 FIBRIN DEGRADE SEMIQUANT: CPT | Performed by: HOSPITALIST

## 2022-10-07 PROCEDURE — 63700000 PHARM REV CODE 250 ALT 637 W/O HCPCS: Performed by: INTERNAL MEDICINE

## 2022-10-07 PROCEDURE — 27000221 HC OXYGEN, UP TO 24 HOURS

## 2022-10-07 PROCEDURE — 63600175 PHARM REV CODE 636 W HCPCS: Performed by: HOSPITALIST

## 2022-10-07 PROCEDURE — 82803 BLOOD GASES ANY COMBINATION: CPT

## 2022-10-07 PROCEDURE — 63600175 PHARM REV CODE 636 W HCPCS: Performed by: INTERNAL MEDICINE

## 2022-10-07 PROCEDURE — 94761 N-INVAS EAR/PLS OXIMETRY MLT: CPT

## 2022-10-07 PROCEDURE — 36415 COLL VENOUS BLD VENIPUNCTURE: CPT | Performed by: HOSPITALIST

## 2022-10-07 PROCEDURE — 36600 WITHDRAWAL OF ARTERIAL BLOOD: CPT

## 2022-10-07 PROCEDURE — 25000003 PHARM REV CODE 250: Performed by: INTERNAL MEDICINE

## 2022-10-07 PROCEDURE — 99900035 HC TECH TIME PER 15 MIN (STAT)

## 2022-10-07 PROCEDURE — 36415 COLL VENOUS BLD VENIPUNCTURE: CPT | Performed by: INTERNAL MEDICINE

## 2022-10-07 PROCEDURE — 82962 GLUCOSE BLOOD TEST: CPT

## 2022-10-07 PROCEDURE — 99239 PR HOSPITAL DISCHARGE DAY,>30 MIN: ICD-10-PCS | Mod: ,,, | Performed by: HOSPITALIST

## 2022-10-07 PROCEDURE — 80048 BASIC METABOLIC PNL TOTAL CA: CPT | Performed by: INTERNAL MEDICINE

## 2022-10-07 RX ADMIN — COLCHICINE 0.6 MG: 0.6 TABLET, FILM COATED ORAL at 09:10

## 2022-10-07 RX ADMIN — AZITHROMYCIN MONOHYDRATE 250 MG: 250 TABLET ORAL at 09:10

## 2022-10-07 RX ADMIN — ALLOPURINOL 100 MG: 100 TABLET ORAL at 09:10

## 2022-10-07 RX ADMIN — ASPIRIN 81 MG CHEWABLE TABLET 81 MG: 81 TABLET CHEWABLE at 09:10

## 2022-10-07 RX ADMIN — INSULIN DETEMIR 30 UNITS: 100 INJECTION, SOLUTION SUBCUTANEOUS at 09:10

## 2022-10-07 RX ADMIN — IPRATROPIUM BROMIDE AND ALBUTEROL SULFATE 3 ML: 2.5; .5 SOLUTION RESPIRATORY (INHALATION) at 07:10

## 2022-10-07 RX ADMIN — INSULIN ASPART 6 UNITS: 100 INJECTION, SOLUTION INTRAVENOUS; SUBCUTANEOUS at 12:10

## 2022-10-07 RX ADMIN — SPIRONOLACTONE 25 MG: 25 TABLET ORAL at 09:10

## 2022-10-07 RX ADMIN — PREDNISONE 40 MG: 20 TABLET ORAL at 09:10

## 2022-10-07 RX ADMIN — INSULIN ASPART 2 UNITS: 100 INJECTION, SOLUTION INTRAVENOUS; SUBCUTANEOUS at 05:10

## 2022-10-07 RX ADMIN — FUROSEMIDE 60 MG: 10 INJECTION, SOLUTION INTRAMUSCULAR; INTRAVENOUS at 05:10

## 2022-10-07 RX ADMIN — SACUBITRIL AND VALSARTAN 1 TABLET: 24; 26 TABLET, FILM COATED ORAL at 09:10

## 2022-10-07 RX ADMIN — CARVEDILOL 12.5 MG: 6.25 TABLET, FILM COATED ORAL at 08:10

## 2022-10-07 RX ADMIN — IPRATROPIUM BROMIDE AND ALBUTEROL SULFATE 3 ML: 2.5; .5 SOLUTION RESPIRATORY (INHALATION) at 12:10

## 2022-10-07 RX ADMIN — AMLODIPINE BESYLATE 10 MG: 10 TABLET ORAL at 09:10

## 2022-10-07 NOTE — PLAN OF CARE
[facility] - [facility] Telemetry  Discharge Final Note    Primary Care Provider: Brandt Rucker MD    Expected Discharge Date:     Final Discharge Note (most recent)       Final Note - 10/07/22 1222          Final Note    Assessment Type Final Discharge Note     Anticipated Discharge Disposition Home or Self Care     What phone number can be called within the next 1-3 days to see how you are doing after discharge? [phone]        Post-Acute Status    Discharge Delays None known at this time                   Patient discharging home today.     Important Message from Medicare  Important Message from Medicare regarding Discharge Appeal Rights: Given to patient/caregiver, Explained to patient/caregiver, Signed/date by patient/caregiver     Date IMM was signed: 10/06/22  Time IMM was signed: 0929    Contact Info       Brandt Rucker MD   Specialty: Family Medicine   Relationship: PCP - General    [address] [place] [address]   Phone: [phone]       Next Steps: Schedule an appointment as soon as possible for a visit on 10/12/2022    Instructions: Hospital discharge acute on chronic heart failure; 11:30 am    JOHNNY Baltazar   Specialty: Emergency Medicine, Gastroenterology    [address] [place], [place] [address]   Phone: [phone]       Next Steps: Schedule an appointment as soon as possible for a visit on 10/27/2022    Instructions: Hospital discharge; Hemorrhoids, needs colonoscopy screening; 9:15 am    Phan Felix MD   Specialty: Interventional Cardiology, Cardiology    [address] [place], [place] [address]   Phone: [phone]       Next Steps: Follow up on 12/7/2022    Instructions: 2:30 pm    Jose Paulino MD   Specialty: Pulmonary Disease    [address] [place], [place] [address]   Phone: [phone]       Next Steps: Follow up on 11/2/2022    Instructions: Hospital discharge follow up; COPD [Eval for PFTs];  1:20 pm

## 2022-10-07 NOTE — ASSESSMENT & PLAN NOTE
Patient states that he has had a sleep study in the past, upon review it looks like per records he was seen in 2020 for this and was given a CPAP to wear. Discussed with patient importance of compliance with this as untreated sleep apnea has many risks. He states that he doesn't like to sleep with the mask on.

## 2022-10-07 NOTE — ASSESSMENT & PLAN NOTE
Continue present management consisting of allopurinol and colchicine    -Was given IV steroids also and will be on steroid taper.

## 2022-10-07 NOTE — PLAN OF CARE
Problem: Adult Inpatient Plan of Care  Goal: Plan of Care Review  Outcome: Met  Goal: Patient-Specific Goal (Individualized)  Outcome: Met  Goal: Absence of Hospital-Acquired Illness or Injury  Outcome: Met  Goal: Optimal Comfort and Wellbeing  Outcome: Met  Goal: Readiness for Transition of Care  Outcome: Met     Problem: Bariatric Environmental Safety  Goal: Safety Maintained with Care  Outcome: Met     Problem: Diabetes Comorbidity  Goal: Blood Glucose Level Within Targeted Range  Outcome: Met     Problem: Gas Exchange Impaired  Goal: Optimal Gas Exchange  Outcome: Met

## 2022-10-07 NOTE — DISCHARGE SUMMARY
[facility] - [address] [place] [address] Medicine  Discharge Summary      Patient Name: Abilio Carroll  MRN: [id number]  Patient Class: IP- Inpatient  Admission Date: 10/3/2022  Hospital Length of Stay: 4 days  Discharge Date and Time:  10/07/2022 12:19 PM  Attending Physician: Yara Wells MD   Discharging Provider: JOHNNY Barber  Primary Care Provider: Brandt Rucker MD      HPI:   Patient is a 62-year-old morbidly obese male with TERI on CPAP, type 2 diabetes, essential hypertension, generalized osteoarthritis, unspecified congestive heart failure and COPD on supplemental oxygen who presents to the emergency room complaining of shortness of breath accompanied by PND, orthopnea, abdominal distension, scrotal and lower extremity edema.  Patient endorses cough with productive of white phlegm and wheezing, but otherwise denied any other associated symptoms such as fever chills hemoptysis chest pain or palpitation.  Patient is said to be compliant with medication as prescribed but continue to experience progressive worsening of aforementioned symptoms prompting ED visit this evening.  On presentation, vital signs were stable and patient was afebrile.  Physical examination was notable for diffuse rhonchi, abdominal distension, scrotal and bilateral lower extremity edema.  Workup in ED was notable for mildly elevated BNP level with chest x-ray demonstrating cardiomegaly with CHF changes, but otherwise unremarkable including EKG and troponin level.  Patient will be admitted for further evaluation and intervention      * No surgery found *      Hospital Course:   10/4: concern for heart failure on admission and patient placed on lasix.  Plan for echo this morning to further investigate cause of symptoms.   10/5:  Shortness of breath is improving.  Patient complains of gout flare with swelling in right hand.  Patient already on IV steroids for gout flare.  10/6: Gout flare and respiratory status  improving but glucose >400 on IV steroids.    Anticipate discharge tomorrow.     10/7: Patient will d/c home today.        Goals of Care Treatment Preferences:  Code Status: Full Code      Consults:   Consults (From admission, onward)        Status Ordering Provider     Inpatient consult to Social Work  Once        Provider:  (Not yet assigned)    Completed RICHAR QUIROGA          * Acute on chronic heart failure  Patient has a history of unspecified heart failure but details are not available for my review at this time.  Will proceed with echocardiogram in a.m. In the meantime, patient will be diuresed with loop diuretic, MRA, reduced dose of beta-blocker and start Entresto.  SGLT2i was not available at inpatient pharmacy.    Diastolic heart failure.  Follow-up with cardiology as outpatient.    Results for orders placed during the hospital encounter of 10/03/22    Echo    Interpretation Summary  · The left ventricle is normal in size with normal systolic function.  · The estimated ejection fraction is 65%.  · Normal left ventricular diastolic function.  · Moderate right ventricular enlargement.  · Moderate right atrial enlargement.  · Moderate mitral regurgitation.  · Mild tricuspid regurgitation.  · Elevated central venous pressure (15 mmHg).  · The estimated PA systolic pressure is 46 mmHg.  · There is pulmonary hypertension.  · Trivial pericardial effusion.      Sleep apnea  Patient states that he has had a sleep study in the past, upon review it looks like per records he was seen in 2020 for this and was given a CPAP to wear. Discussed with patient importance of compliance with this as untreated sleep apnea has many risks. He states that he doesn't like to sleep with the mask on.       Severe obesity (BMI >= 40)  Body mass index is 44.22 kg/m². Morbid obesity complicates all aspects of disease management from diagnostic modalities to treatment. Weight loss encouraged and health benefits explained to  patient.         Gout  Report of gout in right hand and several other joints.    Patient on IV steroids.  Follow-up uric acid.    -Right hand was patient's highest complaint, swelling is going down and patient is able to use his right hand more now.       Stage 2 chronic kidney disease  Likely due to combination of hypertension, chronic heart failure, and type 2 diabetes  Will need to hold off on nonsteroidals for time being      Acute exacerbation of chronic obstructive pulmonary disease (COPD)  Likely due to pulmonary edema from decompensated heart failure.  Will start patient on Zithromax for its immunomodulatory properties along with IV steroid and scheduled/PRN neb treatments    .    Uncontrolled type 2 diabetes mellitus with hyperglycemia  Continue Levemir and will start patient on sliding scale insulin to maintain CBG in the range of 130-180s    Essential hypertension  Continue present management    Discussed with Dr. Wells regarding current BP trend, will stop Entresto for now r/t BP 97/38.       Chronic idiopathic gout of multiple sites  Continue present management consisting of allopurinol and colchicine    -Was given IV steroids also and will be on steroid taper.        Final Active Diagnoses:    Diagnosis Date Noted POA    PRINCIPAL PROBLEM:  Acute on chronic heart failure [I50.9] 10/03/2022 Yes    Sleep apnea [G47.30] 10/07/2022 Yes    Gout [M10.9] 10/05/2022 Yes    Severe obesity (BMI >= 40) [E66.01] 10/05/2022 Yes    Acute exacerbation of chronic obstructive pulmonary disease (COPD) [J44.1] 10/03/2022 Yes    Stage 2 chronic kidney disease [N18.2] 10/03/2022 Yes    Essential hypertension [I10] 07/15/2021 Yes    Uncontrolled type 2 diabetes mellitus with hyperglycemia [E11.65] 07/15/2021 Yes    Chronic idiopathic gout of multiple sites [M1A.09X0] 07/15/2021 Yes      Problems Resolved During this Admission:       Discharged Condition: stable    Disposition: Home or Self Care    Follow Up:    Follow-up Information     Brandt Rucker MD. Schedule an appointment as soon as possible for a visit on 10/12/2022.    Specialty: Family Medicine  Why: Hospital discharge acute on chronic heart failure; 11:30 am  Contact information:  [address] [address]  [place] [address]  [phone]             JOHNNY Baltazar. Schedule an appointment as soon as possible for a visit on 10/27/2022.    Specialties: Emergency Medicine, Gastroenterology  Why: Hospital discharge; Hemorrhoids, needs colonoscopy screening; 9:15 am  Contact information:  [address] [place] [address]  [phone]             Phan Felix MD Follow up on 12/7/2022.    Specialties: Interventional Cardiology, Cardiology  Why: 2:30 pm  Contact information:  [address] [place] [address]  [phone]             Jose Paulino MD Follow up on 11/2/2022.    Specialty: Pulmonary Disease  Why: Hospital discharge follow up; COPD [Eval for PFTs]; 1:20 pm  Contact information:  [address] [place] [address]  [phone]                       Patient Instructions:      Diet diabetic     Notify your health care provider if you experience any of the following:  persistent nausea and vomiting or diarrhea     Notify your health care provider if you experience any of the following:  severe uncontrolled pain     Notify your health care provider if you experience any of the following:  difficulty breathing or increased cough     Notify your health care provider if you experience any of the following:  persistent dizziness, light-headedness, or visual disturbances     Notify your health care provider if you experience any of the following:  increased confusion or weakness     Activity as tolerated       Significant Diagnostic Studies: Labs:   BMP:   Recent Labs   Lab 10/06/22  0441 10/06/22  1518 10/07/22  0358   * 375* 168*   *  --  136   K 4.6  --  4.2   CL 94*  --   93*   CO2 37*  --  42*   BUN 24*  --  27*   CREATININE 0.90  --  0.83   CALCIUM 9.2  --  9.3   , CBC   Recent Labs   Lab 10/06/22  1113   WBC 12.62*   HGB 10.2*   HCT 36.4*       and All labs within the past 24 hours have been reviewed     Medications:  Reconciled Home Medications:      Medication List      START taking these medications    azithromycin 250 MG tablet  Commonly known as: Z-URIEL  Take 1 tablet (250 mg total) by mouth once daily. for 1 day     predniSONE 20 MG tablet  Commonly known as: DELTASONE  Take 2 tablets (40 mg total) by mouth once daily for 1 day, THEN 1.5 tablets (30 mg total) once daily for 1 day, THEN 1 tablet (20 mg total) once daily for 1 day, THEN 0.5 tablets (10 mg total) once daily for 2 days.  Start taking on: October 6, 2022        CHANGE how you take these medications    carvediloL 12.5 MG tablet  Commonly known as: COREG  Take 1 tablet (12.5 mg total) by mouth 2 (two) times daily with meals.  What changed:   · medication strength  · how much to take     indomethacin 50 MG capsule  Commonly known as: INDOCIN  TAKE 1 CAPSULE THREE TIMES DAILY AS NEEDED FOR GOUT (DO NOT TAKE FOR MORE THAN 5 DAYS AT A TIME) Strength: 50 mg      HOLD THIS MEDICATION UNTIL YOUR FOLLOW UP WITH YOUR PCP  What changed: See the new instructions.        CONTINUE taking these medications    ACCU-CHEK LAILA CONTROL SOLN Soln  Generic drug: blood glucose control high,low     albuterol 90 mcg/actuation inhaler  Commonly known as: PROVENTIL/VENTOLIN HFA  Inhale 2 puffs into the lungs every 6 (six) hours as needed for Wheezing. Rescue     albuterol-ipratropium 2.5 mg-0.5 mg/3 mL nebulizer solution  Commonly known as: DUO-NEB  INHALE THE CONTENTS OF 1 VIAL VIA NEBULIZER EVERY 6 HOURS AS NEEDED FOR WHEEZING. RESCUE Strength: 0.5 MG-3 MG(2.5 mg base)/3 mL     allopurinoL 100 MG tablet  Commonly known as: ZYLOPRIM  Take 100 mg by mouth once daily.     amLODIPine 10 MG tablet  Commonly known as: NORVASC  Take 1  tablet (10 mg total) by mouth once daily.     ascorbate calcium (vitamin C) 500 mg Tab  Take 1 tablet by mouth once daily.     aspirin 81 MG Chew  Take 81 mg by mouth once daily.     atorvastatin 40 MG tablet  Commonly known as: LIPITOR  Take 40 mg by mouth once daily.     BLOOD GLUCOSE TEST Strp  Generic drug: blood sugar diagnostic  by Misc.(Non-Drug; Combo Route) route.     cholecalciferol (vitamin D3) 25 mcg (1,000 unit) capsule  Commonly known as: VITAMIN D3  Take 1,000 Units by mouth once daily.     colchicine 0.6 mg tablet  Commonly known as: COLCRYS  Take 0.6 mg by mouth once daily.     cyanocobalamin 1000 MCG tablet  Commonly known as: VITAMIN B-12  Take 100 mcg by mouth once daily.     ferrous sulfate 325 mg (65 mg iron) Tab tablet  Commonly known as: FEOSOL  Take 325 mg by mouth daily with breakfast.     furosemide 40 MG tablet  Commonly known as: LASIX  Take 1 tablet (40 mg total) by mouth 2 (two) times daily.     ibuprofen 200 MG tablet  Commonly known as: ADVIL,MOTRIN  Take 200 mg by mouth every 6 (six) hours as needed for Pain.     insulin aspart U-100 100 unit/mL (3 mL) Inpn pen  Commonly known as: NovoLOG  4 units in the morning before breakfast   If sugar is above 150 inject 4 more units in the PM. SQ     SITagliptin 50 MG Tab  Commonly known as: JANUVIA  Take 50 mg by mouth every morning.     spironolactone 25 MG tablet  Commonly known as: ALDACTONE  Take 25 mg by mouth once daily.     TOUJEO MAX U-300 SOLOSTAR 300 unit/mL (3 mL) insulin pen  Generic drug: insulin glargine U-300 conc  Inject 40 Units into the skin once daily.     TRUE METRIX AIR GLUCOSE METER kit  Generic drug: blood-glucose meter     TRUEPLUS LANCETS 33 gauge Misc  Generic drug: lancets        STOP taking these medications    lisinopriL 20 MG tablet  Commonly known as: PRINIVIL,ZESTRIL            Indwelling Lines/Drains at time of discharge:   Lines/Drains/Airways     None             Discussed/reviewed discharge  plan/medications, current vitals, and labs with Dr. Wells prior to patient's discharge.     Time spent on the discharge of patient: Greater than 30 minutes         JOHNNY Barber  Department of Hospital Medicine  [facility]

## 2022-10-07 NOTE — ASSESSMENT & PLAN NOTE
Report of gout in right hand and several other joints.    Patient on IV steroids.  Follow-up uric acid.    -Right hand was patient's highest complaint, swelling is going down and patient is able to use his right hand more now.

## 2022-10-07 NOTE — ASSESSMENT & PLAN NOTE
Continue present management    Discussed with Dr. Wells regarding current BP trend, will stop Entresto for now r/t BP 97/38.

## 2022-10-07 NOTE — PLAN OF CARE
Patient prefers to rest by sitting in chair due to problems breathing when reclining. He denies pain. Adheres to his I&O's instructions that he states that the doctor gave him. Dr. Wells called with order to check patient's blood sugar around midnight. He stated that he was ok if it was under 200- it was 169. Glucose control is a goal in discharge preparation.

## 2022-10-07 NOTE — SUBJECTIVE & OBJECTIVE
Interval History:     Review of Systems   Constitutional:  Negative for chills, diaphoresis, fatigue and fever.   HENT:  Negative for congestion, hearing loss, nosebleeds, postnasal drip, sore throat, tinnitus and trouble swallowing.    Eyes:  Negative for photophobia, pain, discharge, itching and visual disturbance.   Respiratory:  Positive for cough, shortness of breath and wheezing. Negative for stridor.    Cardiovascular:  Positive for leg swelling. Negative for chest pain and palpitations.   Gastrointestinal:  Positive for abdominal distention. Negative for abdominal pain, anal bleeding, blood in stool, constipation, diarrhea, nausea and vomiting.   Endocrine: Negative for cold intolerance, heat intolerance, polydipsia, polyphagia and polyuria.   Genitourinary:  Negative for decreased urine volume, difficulty urinating, dysuria, flank pain, frequency, hematuria and urgency.   Musculoskeletal:  Negative for arthralgias, back pain, gait problem, joint swelling, myalgias, neck pain and neck stiffness.   Skin:  Negative for color change, pallor and rash.   Allergic/Immunologic: Negative for immunocompromised state.   Neurological:  Negative for dizziness, tremors, seizures, syncope, facial asymmetry, speech difficulty, weakness, light-headedness, numbness and headaches.   Hematological:  Negative for adenopathy. Does not bruise/bleed easily.   Objective:     Vital Signs (Most Recent):  Temp: 98 °F (36.7 °C) (10/06/22 1949)  Pulse: 91 (10/06/22 1949)  Resp: 18 (10/06/22 1949)  BP: (!) 149/90 (10/06/22 1949)  SpO2: 99 % (10/06/22 1949)   Vital Signs (24h Range):  Temp:  [97.9 °F (36.6 °C)-98 °F (36.7 °C)] 98 °F (36.7 °C)  Pulse:  [74-91] 91  Resp:  [18-20] 18  SpO2:  [90 %-99 %] 99 %  BP: (138-149)/(66-90) 149/90     Weight: (!) 159.8 kg (352 lb 4.7 oz)  Body mass index is 46.48 kg/m².    Intake/Output Summary (Last 24 hours) at 10/6/2022 0273  Last data filed at 10/6/2022 1718  Gross per 24 hour   Intake --   Output  6700 ml   Net -6700 ml      Physical Exam  Vitals reviewed.   Constitutional:       General: He is not in acute distress.     Appearance: Normal appearance.   HENT:      Head: Normocephalic and atraumatic.      Right Ear: External ear normal.      Left Ear: External ear normal.   Eyes:      Extraocular Movements: Extraocular movements intact.      Pupils: Pupils are equal, round, and reactive to light.   Cardiovascular:      Rate and Rhythm: Normal rate and regular rhythm.      Pulses: Normal pulses.      Heart sounds: Normal heart sounds. No murmur heard.  Pulmonary:      Effort: Pulmonary effort is normal. No respiratory distress.      Breath sounds: Wheezing and rhonchi present.   Chest:      Chest wall: No tenderness.   Abdominal:      General: There is distension.      Palpations: There is no mass.      Tenderness: There is no abdominal tenderness. There is no right CVA tenderness or left CVA tenderness.   Genitourinary:     Comments: Mild scrotal edema present  Musculoskeletal:         General: No swelling or tenderness. Normal range of motion.      Right lower leg: Edema present.      Left lower leg: Edema present.   Skin:     General: Skin is warm and dry.      Capillary Refill: Capillary refill takes less than 2 seconds.   Neurological:      General: No focal deficit present.      Mental Status: He is alert and oriented to person, place, and time. Mental status is at baseline.   Psychiatric:         Mood and Affect: Mood normal.         Thought Content: Thought content normal.       Significant Labs: All pertinent labs within the past 24 hours have been reviewed.    Significant Imaging: I have reviewed all pertinent imaging results/findings within the past 24 hours.

## 2022-10-07 NOTE — PROGRESS NOTES
[facility] Hospital Medicine  Progress Note    Patient Name: Abilio Carroll  MRN: [id number]  Patient Class: IP- Inpatient   Admission Date: 10/3/2022  Length of Stay: 3 days  Attending Physician: Yara Wells MD  Primary Care Provider: Brandt Rucker MD        Subjective:     Principal Problem:Acute on chronic heart failure    HPI:  Patient is a 62-year-old morbidly obese male with TERI on CPAP, type 2 diabetes, essential hypertension, generalized osteoarthritis, unspecified congestive heart failure and COPD on supplemental oxygen who presents to the emergency room complaining of shortness of breath accompanied by PND, orthopnea, abdominal distension, scrotal and lower extremity edema.  Patient endorses cough with productive of white phlegm and wheezing, but otherwise denied any other associated symptoms such as fever chills hemoptysis chest pain or palpitation.  Patient is said to be compliant with medication as prescribed but continue to experience progressive worsening of aforementioned symptoms prompting ED visit this evening.  On presentation, vital signs were stable and patient was afebrile.  Physical examination was notable for diffuse rhonchi, abdominal distension, scrotal and bilateral lower extremity edema.  Workup in ED was notable for mildly elevated BNP level with chest x-ray demonstrating cardiomegaly with CHF changes, but otherwise unremarkable including EKG and troponin level.  Patient will be admitted for further evaluation and intervention      Overview/Hospital Course:  10/4: concern for heart failure on admission and patient placed on lasix.  Plan for echo this morning to further investigate cause of symptoms.   10/5:  Shortness of breath is improving.  Patient complains of gout flare with swelling in right hand.  Patient already on IV steroids for gout flare.  10/6: Gout flare and respiratory status improving but glucose >400 on IV steroids.     Anticipate discharge tomorrow.       Interval History:     Review of Systems   Constitutional:  Negative for chills, diaphoresis, fatigue and fever.   HENT:  Negative for congestion, hearing loss, nosebleeds, postnasal drip, sore throat, tinnitus and trouble swallowing.    Eyes:  Negative for photophobia, pain, discharge, itching and visual disturbance.   Respiratory:  Positive for cough, shortness of breath and wheezing. Negative for stridor.    Cardiovascular:  Positive for leg swelling. Negative for chest pain and palpitations.   Gastrointestinal:  Positive for abdominal distention. Negative for abdominal pain, anal bleeding, blood in stool, constipation, diarrhea, nausea and vomiting.   Endocrine: Negative for cold intolerance, heat intolerance, polydipsia, polyphagia and polyuria.   Genitourinary:  Negative for decreased urine volume, difficulty urinating, dysuria, flank pain, frequency, hematuria and urgency.   Musculoskeletal:  Negative for arthralgias, back pain, gait problem, joint swelling, myalgias, neck pain and neck stiffness.   Skin:  Negative for color change, pallor and rash.   Allergic/Immunologic: Negative for immunocompromised state.   Neurological:  Negative for dizziness, tremors, seizures, syncope, facial asymmetry, speech difficulty, weakness, light-headedness, numbness and headaches.   Hematological:  Negative for adenopathy. Does not bruise/bleed easily.   Objective:     Vital Signs (Most Recent):  Temp: 98 °F (36.7 °C) (10/06/22 1949)  Pulse: 91 (10/06/22 1949)  Resp: 18 (10/06/22 1949)  BP: (!) 149/90 (10/06/22 1949)  SpO2: 99 % (10/06/22 1949)   Vital Signs (24h Range):  Temp:  [97.9 °F (36.6 °C)-98 °F (36.7 °C)] 98 °F (36.7 °C)  Pulse:  [74-91] 91  Resp:  [18-20] 18  SpO2:  [90 %-99 %] 99 %  BP: (138-149)/(66-90) 149/90     Weight: (!) 159.8 kg (352 lb 4.7 oz)  Body mass index is 46.48 kg/m².    Intake/Output Summary (Last 24 hours) at 10/6/2022 6531  Last data filed at 10/6/2022  1718  Gross per 24 hour   Intake --   Output 6700 ml   Net -6700 ml      Physical Exam  Vitals reviewed.   Constitutional:       General: He is not in acute distress.     Appearance: Normal appearance.   HENT:      Head: Normocephalic and atraumatic.      Right Ear: External ear normal.      Left Ear: External ear normal.   Eyes:      Extraocular Movements: Extraocular movements intact.      Pupils: Pupils are equal, round, and reactive to light.   Cardiovascular:      Rate and Rhythm: Normal rate and regular rhythm.      Pulses: Normal pulses.      Heart sounds: Normal heart sounds. No murmur heard.  Pulmonary:      Effort: Pulmonary effort is normal. No respiratory distress.      Breath sounds: Wheezing and rhonchi present.   Chest:      Chest wall: No tenderness.   Abdominal:      General: There is distension.      Palpations: There is no mass.      Tenderness: There is no abdominal tenderness. There is no right CVA tenderness or left CVA tenderness.   Genitourinary:     Comments: Mild scrotal edema present  Musculoskeletal:         General: No swelling or tenderness. Normal range of motion.      Right lower leg: Edema present.      Left lower leg: Edema present.   Skin:     General: Skin is warm and dry.      Capillary Refill: Capillary refill takes less than 2 seconds.   Neurological:      General: No focal deficit present.      Mental Status: He is alert and oriented to person, place, and time. Mental status is at baseline.   Psychiatric:         Mood and Affect: Mood normal.         Thought Content: Thought content normal.       Significant Labs: All pertinent labs within the past 24 hours have been reviewed.    Significant Imaging: I have reviewed all pertinent imaging results/findings within the past 24 hours.      Assessment/Plan:      * Acute on chronic heart failure    Patient has a history of unspecified heart failure but details are not available for my review at this time.  Will proceed with  echocardiogram in a.m. In the meantime, patient will be diuresed with loop diuretic, MRA, reduced dose of beta-blocker and start Entresto.  SGLT2i was not available at inpatient pharmacy.    Diastolic heart failure.  Follow-up with cardiology as outpatient.    Results for orders placed during the hospital encounter of 10/03/22    Echo    Interpretation Summary  · The left ventricle is normal in size with normal systolic function.  · The estimated ejection fraction is 65%.  · Normal left ventricular diastolic function.  · Moderate right ventricular enlargement.  · Moderate right atrial enlargement.  · Moderate mitral regurgitation.  · Mild tricuspid regurgitation.  · Elevated central venous pressure (15 mmHg).  · The estimated PA systolic pressure is 46 mmHg.  · There is pulmonary hypertension.  · Trivial pericardial effusion.      Acute exacerbation of chronic obstructive pulmonary disease (COPD)    Likely due to pulmonary edema from decompensated heart failure.  Will start patient on Zithromax for its immunomodulatory properties along with IV steroid and scheduled/PRN neb treatments    .    Severe obesity (BMI >= 40)  Body mass index is 46.48 kg/m². Morbid obesity complicates all aspects of disease management from diagnostic modalities to treatment. Weight loss encouraged and health benefits explained to patient.         Gout  Report of gout in right hand and several other joints.    Patient on IV steroids.  Follow-up uric acid.      Stage 2 chronic kidney disease    Likely due to combination of hypertension, chronic heart failure, and type 2 diabetes  Will need to hold off on nonsteroidals for time being      Uncontrolled type 2 diabetes mellitus with hyperglycemia    Continue Levemir and will start patient on sliding scale insulin to maintain CBG in the range of 130-180s    Essential hypertension    Continue present management      Chronic idiopathic gout of multiple sites    Continue present management  consisting of allopurinol and colchicine        VTE Risk Mitigation (From admission, onward)         Ordered     enoxaparin injection 40 mg  Daily         10/03/22 2224     Place sequential compression device  Until discontinued         10/03/22 2224     IP VTE HIGH RISK PATIENT  Once         10/03/22 2224                Discharge Planning   TONG: 10/6/2022     Code Status: Full Code   Is the patient medically ready for discharge?:     Reason for patient still in hospital (select all that apply): Treatment  Discharge Plan A: Home with family   Discharge Delays: None known at this time              Yara Wells MD  Department of Hospital Medicine   [facility]

## 2022-11-09 DIAGNOSIS — M25.561 PAIN IN BOTH KNEES, UNSPECIFIED CHRONICITY: Primary | ICD-10-CM

## 2022-11-09 DIAGNOSIS — M25.562 PAIN IN BOTH KNEES, UNSPECIFIED CHRONICITY: Primary | ICD-10-CM

## 2023-08-02 ENCOUNTER — HOSPITAL ENCOUNTER (INPATIENT)
Facility: HOSPITAL | Age: 63
LOS: 11 days | Discharge: HOME HEALTH | DRG: 377 | End: 2023-08-14
Attending: INTERNAL MEDICINE | Admitting: INTERNAL MEDICINE
Payer: MEDICARE

## 2023-08-02 DIAGNOSIS — D64.9 ANEMIA, UNSPECIFIED TYPE: ICD-10-CM

## 2023-08-02 DIAGNOSIS — R06.02 SHORTNESS OF BREATH: ICD-10-CM

## 2023-08-02 DIAGNOSIS — I50.9 CHF (CONGESTIVE HEART FAILURE): ICD-10-CM

## 2023-08-02 DIAGNOSIS — D62 ACUTE BLOOD LOSS ANEMIA: Primary | ICD-10-CM

## 2023-08-02 DIAGNOSIS — R07.9 CHEST PAIN: ICD-10-CM

## 2023-08-02 DIAGNOSIS — G47.33 OBSTRUCTIVE SLEEP APNEA SYNDROME: ICD-10-CM

## 2023-08-02 DIAGNOSIS — J96.22 ACUTE ON CHRONIC RESPIRATORY FAILURE WITH HYPERCAPNIA: ICD-10-CM

## 2023-08-02 DIAGNOSIS — E66.2 OBESITY HYPOVENTILATION SYNDROME: ICD-10-CM

## 2023-08-02 DIAGNOSIS — D50.8 OTHER IRON DEFICIENCY ANEMIA: ICD-10-CM

## 2023-08-02 DIAGNOSIS — J96.21 ACUTE ON CHRONIC RESPIRATORY FAILURE WITH HYPOXIA AND HYPERCAPNIA: ICD-10-CM

## 2023-08-02 DIAGNOSIS — J96.22 ACUTE ON CHRONIC RESPIRATORY FAILURE WITH HYPOXIA AND HYPERCAPNIA: ICD-10-CM

## 2023-08-02 PROCEDURE — 80053 COMPREHEN METABOLIC PANEL: CPT | Performed by: NURSE PRACTITIONER

## 2023-08-02 PROCEDURE — 83880 ASSAY OF NATRIURETIC PEPTIDE: CPT | Performed by: NURSE PRACTITIONER

## 2023-08-02 PROCEDURE — 93010 ELECTROCARDIOGRAM REPORT: CPT | Mod: ,,, | Performed by: INTERNAL MEDICINE

## 2023-08-02 PROCEDURE — 82077 ASSAY SPEC XCP UR&BREATH IA: CPT

## 2023-08-02 PROCEDURE — 36430 TRANSFUSION BLD/BLD COMPNT: CPT

## 2023-08-02 PROCEDURE — 85025 COMPLETE CBC W/AUTO DIFF WBC: CPT | Performed by: NURSE PRACTITIONER

## 2023-08-02 PROCEDURE — 99285 PR EMERGENCY DEPT VISIT,LEVEL V: ICD-10-PCS | Mod: ,,, | Performed by: NURSE PRACTITIONER

## 2023-08-02 PROCEDURE — 93005 ELECTROCARDIOGRAM TRACING: CPT

## 2023-08-02 PROCEDURE — 93010 EKG 12-LEAD: ICD-10-PCS | Mod: ,,, | Performed by: INTERNAL MEDICINE

## 2023-08-02 PROCEDURE — 99285 EMERGENCY DEPT VISIT HI MDM: CPT | Mod: ,,, | Performed by: NURSE PRACTITIONER

## 2023-08-02 PROCEDURE — 99285 EMERGENCY DEPT VISIT HI MDM: CPT | Mod: 25

## 2023-08-03 ENCOUNTER — ANESTHESIA EVENT (OUTPATIENT)
Dept: GASTROENTEROLOGY | Facility: HOSPITAL | Age: 63
DRG: 377 | End: 2023-08-03
Payer: MEDICARE

## 2023-08-03 ENCOUNTER — ANESTHESIA (OUTPATIENT)
Dept: GASTROENTEROLOGY | Facility: HOSPITAL | Age: 63
DRG: 377 | End: 2023-08-03
Payer: MEDICARE

## 2023-08-03 PROBLEM — J44.9 COPD (CHRONIC OBSTRUCTIVE PULMONARY DISEASE): Status: ACTIVE | Noted: 2022-10-03

## 2023-08-03 PROBLEM — F10.10 ALCOHOL ABUSE: Status: ACTIVE | Noted: 2023-08-03

## 2023-08-03 PROBLEM — N17.9 AKI (ACUTE KIDNEY INJURY): Status: ACTIVE | Noted: 2023-08-03

## 2023-08-03 PROBLEM — D62 ACUTE BLOOD LOSS ANEMIA: Status: ACTIVE | Noted: 2023-08-03

## 2023-08-03 LAB
ABO + RH BLD: NORMAL
ABO + RH BLD: NORMAL
ALBUMIN SERPL BCP-MCNC: 3.2 G/DL (ref 3.5–5)
ALBUMIN/GLOB SERPL: 0.8 {RATIO}
ALP SERPL-CCNC: 62 U/L (ref 45–115)
ALT SERPL W P-5'-P-CCNC: 25 U/L (ref 16–61)
AMPHET UR QL SCN: NEGATIVE
ANION GAP SERPL CALCULATED.3IONS-SCNC: 14 MMOL/L (ref 7–16)
ANISOCYTOSIS BLD QL SMEAR: NORMAL
APTT PPP: 33 SECONDS (ref 25.2–37.3)
AST SERPL W P-5'-P-CCNC: 13 U/L (ref 15–37)
BARBITURATES UR QL SCN: NEGATIVE
BASOPHILS # BLD AUTO: 0.01 K/UL (ref 0–0.2)
BASOPHILS NFR BLD AUTO: 0.1 % (ref 0–1)
BENZODIAZ METAB UR QL SCN: NEGATIVE
BILIRUB SERPL-MCNC: 0.4 MG/DL (ref ?–1.2)
BILIRUB UR QL STRIP: NEGATIVE
BLD PROD TYP BPU: NORMAL
BLD PROD TYP BPU: NORMAL
BLOOD UNIT EXPIRATION DATE: NORMAL
BLOOD UNIT EXPIRATION DATE: NORMAL
BLOOD UNIT TYPE CODE: 5100
BLOOD UNIT TYPE CODE: 5100
BUN SERPL-MCNC: 25 MG/DL (ref 7–18)
BUN/CREAT SERPL: 18 (ref 6–20)
CALCIUM SERPL-MCNC: 7.9 MG/DL (ref 8.5–10.1)
CANNABINOIDS UR QL SCN: NEGATIVE
CHLORIDE SERPL-SCNC: 99 MMOL/L (ref 98–107)
CHOLEST SERPL-MCNC: 66 MG/DL (ref 0–200)
CHOLEST/HDLC SERPL: 2.2 {RATIO}
CLARITY UR: CLEAR
CO2 SERPL-SCNC: 28 MMOL/L (ref 21–32)
COCAINE UR QL SCN: NEGATIVE
COLOR UR: ABNORMAL
CREAT SERPL-MCNC: 1.4 MG/DL (ref 0.7–1.3)
CROSSMATCH INTERPRETATION: NORMAL
CROSSMATCH INTERPRETATION: NORMAL
CRP SERPL-MCNC: <0.29 MG/DL (ref 0–0.8)
DIFFERENTIAL METHOD BLD: ABNORMAL
DISPENSE STATUS: NORMAL
DISPENSE STATUS: NORMAL
EGFR (NO RACE VARIABLE) (RUSH/TITUS): 58 ML/MIN/1.73M2
EOSINOPHIL # BLD AUTO: 0.12 K/UL (ref 0–0.5)
EOSINOPHIL NFR BLD AUTO: 1.4 % (ref 1–4)
ERYTHROCYTE [DISTWIDTH] IN BLOOD BY AUTOMATED COUNT: 20.2 % (ref 11.5–14.5)
ETHANOL SERPL-MCNC: 52 MG/DL
GLOBULIN SER-MCNC: 4 G/DL (ref 2–4)
GLUCOSE SERPL-MCNC: 103 MG/DL (ref 70–105)
GLUCOSE SERPL-MCNC: 106 MG/DL (ref 70–105)
GLUCOSE SERPL-MCNC: 127 MG/DL (ref 70–105)
GLUCOSE SERPL-MCNC: 130 MG/DL (ref 74–106)
GLUCOSE SERPL-MCNC: 160 MG/DL (ref 70–105)
GLUCOSE UR STRIP-MCNC: NORMAL MG/DL
HAV IGM SER QL: NORMAL
HBV CORE IGM SER QL: NORMAL
HBV SURFACE AG SERPL QL IA: NORMAL
HCT VFR BLD AUTO: 22 % (ref 40–54)
HCV AB SER QL: NORMAL
HDLC SERPL-MCNC: 30 MG/DL (ref 40–60)
HGB BLD-MCNC: 5.3 G/DL (ref 13.5–18)
HGB BLD-MCNC: 7.3 G/DL (ref 13.5–18)
HGB BLD-MCNC: 7.5 G/DL (ref 13.5–18)
HIV 1+O+2 AB SERPL QL: NORMAL
HYPOCHROMIA BLD QL SMEAR: NORMAL
IMM GRANULOCYTES # BLD AUTO: 0.02 K/UL (ref 0–0.04)
IMM GRANULOCYTES NFR BLD: 0.2 % (ref 0–0.4)
INDIRECT COOMBS: NORMAL
INR BLD: 1.33
KETONES UR STRIP-SCNC: NEGATIVE MG/DL
LDLC SERPL CALC-MCNC: 23 MG/DL
LEUKOCYTE ESTERASE UR QL STRIP: NEGATIVE
LYMPHOCYTES # BLD AUTO: 1.35 K/UL (ref 1–4.8)
LYMPHOCYTES NFR BLD AUTO: 15.8 % (ref 27–41)
MCH RBC QN AUTO: 18.2 PG (ref 27–31)
MCHC RBC AUTO-ENTMCNC: 24.1 G/DL (ref 32–36)
MCV RBC AUTO: 75.6 FL (ref 80–96)
MICROCYTES BLD QL SMEAR: NORMAL
MONOCYTES # BLD AUTO: 1.06 K/UL (ref 0–0.8)
MONOCYTES NFR BLD AUTO: 12.4 % (ref 2–6)
MPC BLD CALC-MCNC: 9.6 FL (ref 9.4–12.4)
MUCOUS, UA: ABNORMAL /LPF
NEUTROPHILS # BLD AUTO: 6 K/UL (ref 1.8–7.7)
NEUTROPHILS NFR BLD AUTO: 70.1 % (ref 53–65)
NITRITE UR QL STRIP: NEGATIVE
NONHDLC SERPL-MCNC: 36 MG/DL
NRBC # BLD AUTO: 0.07 X10E3/UL
NRBC, AUTO (.00): 0.8 %
NT-PROBNP SERPL-MCNC: 408 PG/ML (ref 1–125)
OPIATES UR QL SCN: NEGATIVE
PCP UR QL SCN: NEGATIVE
PH UR STRIP: 5.5 PH UNITS
PLATELET # BLD AUTO: 188 K/UL (ref 150–400)
PLATELET MORPHOLOGY: NORMAL
POLYCHROMASIA BLD QL SMEAR: NORMAL
POTASSIUM SERPL-SCNC: 3.8 MMOL/L (ref 3.5–5.1)
PROT SERPL-MCNC: 7.2 G/DL (ref 6.4–8.2)
PROT UR QL STRIP: 100
PROTHROMBIN TIME: 16.3 SECONDS (ref 11.7–14.7)
RBC # BLD AUTO: 2.91 M/UL (ref 4.6–6.2)
RBC # UR STRIP: NEGATIVE /UL
RBC #/AREA URNS HPF: 1 /HPF
RH BLD: NORMAL
SARS-COV-2 RDRP RESP QL NAA+PROBE: NEGATIVE
SODIUM SERPL-SCNC: 137 MMOL/L (ref 136–145)
SP GR UR STRIP: 1.01
SPECIMEN OUTDATE: NORMAL
SQUAMOUS #/AREA URNS LPF: ABNORMAL /HPF
TRIGL SERPL-MCNC: 66 MG/DL (ref 35–150)
TROPONIN I SERPL DL<=0.01 NG/ML-MCNC: 7.4 PG/ML
TSH SERPL DL<=0.005 MIU/L-ACNC: 0.95 UIU/ML (ref 0.36–3.74)
UNIT NUMBER: NORMAL
UNIT NUMBER: NORMAL
URATE SERPL-MCNC: 11.5 MG/DL (ref 3.5–7.2)
UROBILINOGEN UR STRIP-ACNC: NORMAL MG/DL
VLDLC SERPL-MCNC: 13 MG/DL
WBC # BLD AUTO: 8.56 K/UL (ref 4.5–11)
WBC #/AREA URNS HPF: 1 /HPF

## 2023-08-03 PROCEDURE — 11000001 HC ACUTE MED/SURG PRIVATE ROOM

## 2023-08-03 PROCEDURE — D9220A PRA ANESTHESIA: Mod: ANES,,, | Performed by: ANESTHESIOLOGY

## 2023-08-03 PROCEDURE — 99499 NO LOS: ICD-10-PCS | Mod: ,,, | Performed by: HOSPITALIST

## 2023-08-03 PROCEDURE — 99900035 HC TECH TIME PER 15 MIN (STAT)

## 2023-08-03 PROCEDURE — 63600175 PHARM REV CODE 636 W HCPCS

## 2023-08-03 PROCEDURE — 85730 THROMBOPLASTIN TIME PARTIAL: CPT | Performed by: NURSE PRACTITIONER

## 2023-08-03 PROCEDURE — D9220A PRA ANESTHESIA: Mod: CRNA,,,

## 2023-08-03 PROCEDURE — 99499 UNLISTED E&M SERVICE: CPT | Mod: ,,, | Performed by: HOSPITALIST

## 2023-08-03 PROCEDURE — 25000003 PHARM REV CODE 250

## 2023-08-03 PROCEDURE — P9016 RBC LEUKOCYTES REDUCED: HCPCS

## 2023-08-03 PROCEDURE — 87635 SARS-COV-2 COVID-19 AMP PRB: CPT | Performed by: NURSE PRACTITIONER

## 2023-08-03 PROCEDURE — 83036 HEMOGLOBIN GLYCOSYLATED A1C: CPT | Mod: 90 | Performed by: INTERNAL MEDICINE

## 2023-08-03 PROCEDURE — D9220A PRA ANESTHESIA: ICD-10-PCS | Mod: CRNA,,,

## 2023-08-03 PROCEDURE — 84550 ASSAY OF BLOOD/URIC ACID: CPT

## 2023-08-03 PROCEDURE — 25000003 PHARM REV CODE 250: Performed by: INTERNAL MEDICINE

## 2023-08-03 PROCEDURE — 86923 COMPATIBILITY TEST ELECTRIC: CPT | Mod: 91 | Performed by: INTERNAL MEDICINE

## 2023-08-03 PROCEDURE — 80307 DRUG TEST PRSMV CHEM ANLYZR: CPT | Performed by: INTERNAL MEDICINE

## 2023-08-03 PROCEDURE — 43235 PR EGD, FLEX, DIAGNOSTIC: ICD-10-PCS | Mod: ,,, | Performed by: INTERNAL MEDICINE

## 2023-08-03 PROCEDURE — 80074 ACUTE HEPATITIS PANEL: CPT

## 2023-08-03 PROCEDURE — 25000242 PHARM REV CODE 250 ALT 637 W/ HCPCS

## 2023-08-03 PROCEDURE — 99223 PR INITIAL HOSPITAL CARE,LEVL III: ICD-10-PCS | Mod: AI,,, | Performed by: INTERNAL MEDICINE

## 2023-08-03 PROCEDURE — 86900 BLOOD TYPING SEROLOGIC ABO: CPT | Performed by: NURSE PRACTITIONER

## 2023-08-03 PROCEDURE — 27000221 HC OXYGEN, UP TO 24 HOURS

## 2023-08-03 PROCEDURE — 94761 N-INVAS EAR/PLS OXIMETRY MLT: CPT

## 2023-08-03 PROCEDURE — 87389 HIV-1 AG W/HIV-1&-2 AB AG IA: CPT

## 2023-08-03 PROCEDURE — 85610 PROTHROMBIN TIME: CPT | Performed by: NURSE PRACTITIONER

## 2023-08-03 PROCEDURE — 99223 1ST HOSP IP/OBS HIGH 75: CPT | Mod: AI,,, | Performed by: INTERNAL MEDICINE

## 2023-08-03 PROCEDURE — 63600175 PHARM REV CODE 636 W HCPCS: Performed by: INTERNAL MEDICINE

## 2023-08-03 PROCEDURE — 86923 COMPATIBILITY TEST ELECTRIC: CPT | Mod: 91

## 2023-08-03 PROCEDURE — 85018 HEMOGLOBIN: CPT | Performed by: INTERNAL MEDICINE

## 2023-08-03 PROCEDURE — C9113 INJ PANTOPRAZOLE SODIUM, VIA: HCPCS | Performed by: INTERNAL MEDICINE

## 2023-08-03 PROCEDURE — 43235 EGD DIAGNOSTIC BRUSH WASH: CPT | Mod: ,,, | Performed by: INTERNAL MEDICINE

## 2023-08-03 PROCEDURE — 84443 ASSAY THYROID STIM HORMONE: CPT

## 2023-08-03 PROCEDURE — 81001 URINALYSIS AUTO W/SCOPE: CPT | Performed by: NURSE PRACTITIONER

## 2023-08-03 PROCEDURE — 99223 1ST HOSP IP/OBS HIGH 75: CPT | Mod: 25,,, | Performed by: INTERNAL MEDICINE

## 2023-08-03 PROCEDURE — 84484 ASSAY OF TROPONIN QUANT: CPT

## 2023-08-03 PROCEDURE — 43235 EGD DIAGNOSTIC BRUSH WASH: CPT | Performed by: INTERNAL MEDICINE

## 2023-08-03 PROCEDURE — 86140 C-REACTIVE PROTEIN: CPT

## 2023-08-03 PROCEDURE — 94640 AIRWAY INHALATION TREATMENT: CPT

## 2023-08-03 PROCEDURE — 84145 PROCALCITONIN (PCT): CPT | Mod: 90

## 2023-08-03 PROCEDURE — 80061 LIPID PANEL: CPT

## 2023-08-03 PROCEDURE — 25000003 PHARM REV CODE 250: Performed by: NURSE PRACTITIONER

## 2023-08-03 PROCEDURE — 82962 GLUCOSE BLOOD TEST: CPT

## 2023-08-03 PROCEDURE — 99223 PR INITIAL HOSPITAL CARE,LEVL III: ICD-10-PCS | Mod: 25,,, | Performed by: INTERNAL MEDICINE

## 2023-08-03 PROCEDURE — D9220A PRA ANESTHESIA: ICD-10-PCS | Mod: ANES,,, | Performed by: ANESTHESIOLOGY

## 2023-08-03 RX ORDER — HYDROCODONE BITARTRATE AND ACETAMINOPHEN 5; 325 MG/1; MG/1
1 TABLET ORAL EVERY 6 HOURS PRN
Status: DISCONTINUED | OUTPATIENT
Start: 2023-08-03 | End: 2023-08-12

## 2023-08-03 RX ORDER — IPRATROPIUM BROMIDE AND ALBUTEROL SULFATE 2.5; .5 MG/3ML; MG/3ML
3 SOLUTION RESPIRATORY (INHALATION) EVERY 6 HOURS
Status: DISCONTINUED | OUTPATIENT
Start: 2023-08-03 | End: 2023-08-14 | Stop reason: HOSPADM

## 2023-08-03 RX ORDER — PANTOPRAZOLE SODIUM 40 MG/10ML
40 INJECTION, POWDER, LYOPHILIZED, FOR SOLUTION INTRAVENOUS 2 TIMES DAILY
Status: DISCONTINUED | OUTPATIENT
Start: 2023-08-03 | End: 2023-08-04

## 2023-08-03 RX ORDER — SODIUM CHLORIDE 9 MG/ML
INJECTION, SOLUTION INTRAVENOUS CONTINUOUS PRN
Status: DISCONTINUED | OUTPATIENT
Start: 2023-08-03 | End: 2023-08-03

## 2023-08-03 RX ORDER — FUROSEMIDE 10 MG/ML
40 INJECTION INTRAMUSCULAR; INTRAVENOUS
Status: DISCONTINUED | OUTPATIENT
Start: 2023-08-03 | End: 2023-08-08

## 2023-08-03 RX ORDER — IBUPROFEN 200 MG
24 TABLET ORAL
Status: DISCONTINUED | OUTPATIENT
Start: 2023-08-03 | End: 2023-08-14 | Stop reason: HOSPADM

## 2023-08-03 RX ORDER — ALLOPURINOL 100 MG/1
100 TABLET ORAL DAILY
Status: DISCONTINUED | OUTPATIENT
Start: 2023-08-03 | End: 2023-08-03

## 2023-08-03 RX ORDER — PANTOPRAZOLE SODIUM 40 MG/10ML
80 INJECTION, POWDER, LYOPHILIZED, FOR SOLUTION INTRAVENOUS ONCE
Status: COMPLETED | OUTPATIENT
Start: 2023-08-03 | End: 2023-08-03

## 2023-08-03 RX ORDER — LIDOCAINE HYDROCHLORIDE 20 MG/ML
INJECTION, SOLUTION EPIDURAL; INFILTRATION; INTRACAUDAL; PERINEURAL
Status: DISCONTINUED | OUTPATIENT
Start: 2023-08-03 | End: 2023-08-03

## 2023-08-03 RX ORDER — PROPOFOL 10 MG/ML
VIAL (ML) INTRAVENOUS
Status: DISCONTINUED | OUTPATIENT
Start: 2023-08-03 | End: 2023-08-03

## 2023-08-03 RX ORDER — CHLORDIAZEPOXIDE HYDROCHLORIDE 25 MG/1
25 CAPSULE, GELATIN COATED ORAL EVERY 6 HOURS
Status: DISCONTINUED | OUTPATIENT
Start: 2023-08-03 | End: 2023-08-05

## 2023-08-03 RX ORDER — IBUPROFEN 200 MG
16 TABLET ORAL
Status: DISCONTINUED | OUTPATIENT
Start: 2023-08-03 | End: 2023-08-14 | Stop reason: HOSPADM

## 2023-08-03 RX ORDER — AMLODIPINE BESYLATE 5 MG/1
10 TABLET ORAL DAILY
Status: DISCONTINUED | OUTPATIENT
Start: 2023-08-03 | End: 2023-08-03

## 2023-08-03 RX ORDER — CARVEDILOL 6.25 MG/1
6.25 TABLET ORAL 2 TIMES DAILY WITH MEALS
Status: DISCONTINUED | OUTPATIENT
Start: 2023-08-03 | End: 2023-08-05

## 2023-08-03 RX ORDER — HYDROCODONE BITARTRATE AND ACETAMINOPHEN 500; 5 MG/1; MG/1
TABLET ORAL
Status: DISCONTINUED | OUTPATIENT
Start: 2023-08-03 | End: 2023-08-07

## 2023-08-03 RX ORDER — NALOXONE HCL 0.4 MG/ML
0.02 VIAL (ML) INJECTION
Status: DISCONTINUED | OUTPATIENT
Start: 2023-08-03 | End: 2023-08-14 | Stop reason: HOSPADM

## 2023-08-03 RX ORDER — COLCHICINE 0.6 MG/1
0.6 TABLET, FILM COATED ORAL 2 TIMES DAILY
Status: DISCONTINUED | OUTPATIENT
Start: 2023-08-03 | End: 2023-08-05

## 2023-08-03 RX ORDER — GLUCAGON 1 MG
1 KIT INJECTION
Status: DISCONTINUED | OUTPATIENT
Start: 2023-08-03 | End: 2023-08-14 | Stop reason: HOSPADM

## 2023-08-03 RX ORDER — INSULIN ASPART 100 [IU]/ML
1-10 INJECTION, SOLUTION INTRAVENOUS; SUBCUTANEOUS EVERY 6 HOURS PRN
Status: DISCONTINUED | OUTPATIENT
Start: 2023-08-03 | End: 2023-08-14 | Stop reason: HOSPADM

## 2023-08-03 RX ORDER — ATORVASTATIN CALCIUM 40 MG/1
40 TABLET, FILM COATED ORAL DAILY
Status: DISCONTINUED | OUTPATIENT
Start: 2023-08-03 | End: 2023-08-05

## 2023-08-03 RX ORDER — CARVEDILOL 12.5 MG/1
12.5 TABLET ORAL 2 TIMES DAILY WITH MEALS
Status: DISCONTINUED | OUTPATIENT
Start: 2023-08-03 | End: 2023-08-03

## 2023-08-03 RX ORDER — NAPROXEN SODIUM 220 MG/1
81 TABLET, FILM COATED ORAL DAILY
Status: DISCONTINUED | OUTPATIENT
Start: 2023-08-03 | End: 2023-08-03

## 2023-08-03 RX ORDER — ACETAMINOPHEN 500 MG
1000 TABLET ORAL EVERY 4 HOURS PRN
Status: DISCONTINUED | OUTPATIENT
Start: 2023-08-03 | End: 2023-08-14 | Stop reason: HOSPADM

## 2023-08-03 RX ORDER — SPIRONOLACTONE 25 MG/1
25 TABLET ORAL DAILY
Status: DISCONTINUED | OUTPATIENT
Start: 2023-08-03 | End: 2023-08-05

## 2023-08-03 RX ORDER — INSULIN ASPART 100 [IU]/ML
1-10 INJECTION, SOLUTION INTRAVENOUS; SUBCUTANEOUS EVERY 4 HOURS
Status: DISCONTINUED | OUTPATIENT
Start: 2023-08-03 | End: 2023-08-03

## 2023-08-03 RX ORDER — SODIUM CHLORIDE 0.9 % (FLUSH) 0.9 %
10 SYRINGE (ML) INJECTION EVERY 12 HOURS PRN
Status: DISCONTINUED | OUTPATIENT
Start: 2023-08-03 | End: 2023-08-14 | Stop reason: HOSPADM

## 2023-08-03 RX ORDER — LORAZEPAM 2 MG/ML
1 INJECTION INTRAMUSCULAR ONCE AS NEEDED
Status: DISCONTINUED | OUTPATIENT
Start: 2023-08-03 | End: 2023-08-12

## 2023-08-03 RX ADMIN — COLCHICINE 0.6 MG: 0.6 TABLET ORAL at 02:08

## 2023-08-03 RX ADMIN — FUROSEMIDE 40 MG: 10 INJECTION, SOLUTION INTRAVENOUS at 02:08

## 2023-08-03 RX ADMIN — THERA TABS 1 TABLET: TAB at 08:08

## 2023-08-03 RX ADMIN — CHLORDIAZEPOXIDE HYDROCHLORIDE 25 MG: 25 CAPSULE ORAL at 05:08

## 2023-08-03 RX ADMIN — IPRATROPIUM BROMIDE AND ALBUTEROL SULFATE 3 ML: 2.5; .5 SOLUTION RESPIRATORY (INHALATION) at 02:08

## 2023-08-03 RX ADMIN — THIAMINE HYDROCHLORIDE 500 MG: 100 INJECTION, SOLUTION INTRAMUSCULAR; INTRAVENOUS at 08:08

## 2023-08-03 RX ADMIN — PANTOPRAZOLE SODIUM 80 MG: 40 INJECTION, POWDER, FOR SOLUTION INTRAVENOUS at 04:08

## 2023-08-03 RX ADMIN — PANTOPRAZOLE SODIUM 40 MG: 40 INJECTION, POWDER, FOR SOLUTION INTRAVENOUS at 08:08

## 2023-08-03 RX ADMIN — SODIUM CHLORIDE: 9 INJECTION, SOLUTION INTRAVENOUS at 01:08

## 2023-08-03 RX ADMIN — IPRATROPIUM BROMIDE AND ALBUTEROL SULFATE 3 ML: 2.5; .5 SOLUTION RESPIRATORY (INHALATION) at 12:08

## 2023-08-03 RX ADMIN — COLCHICINE 0.6 MG: 0.6 TABLET ORAL at 08:08

## 2023-08-03 RX ADMIN — THIAMINE HYDROCHLORIDE 500 MG: 100 INJECTION, SOLUTION INTRAMUSCULAR; INTRAVENOUS at 04:08

## 2023-08-03 RX ADMIN — CARVEDILOL 6.25 MG: 6.25 TABLET, FILM COATED ORAL at 05:08

## 2023-08-03 RX ADMIN — IPRATROPIUM BROMIDE AND ALBUTEROL SULFATE 3 ML: 2.5; .5 SOLUTION RESPIRATORY (INHALATION) at 08:08

## 2023-08-03 RX ADMIN — ATORVASTATIN CALCIUM 40 MG: 40 TABLET, FILM COATED ORAL at 08:08

## 2023-08-03 RX ADMIN — SODIUM CHLORIDE: 9 INJECTION, SOLUTION INTRAVENOUS at 02:08

## 2023-08-03 RX ADMIN — FUROSEMIDE 40 MG: 10 INJECTION, SOLUTION INTRAVENOUS at 05:08

## 2023-08-03 RX ADMIN — PROPOFOL 80 MG: 10 INJECTION, EMULSION INTRAVENOUS at 01:08

## 2023-08-03 RX ADMIN — FOLIC ACID 1 MG: 5 INJECTION, SOLUTION INTRAMUSCULAR; INTRAVENOUS; SUBCUTANEOUS at 08:08

## 2023-08-03 RX ADMIN — LIDOCAINE HYDROCHLORIDE 100 MG: 20 INJECTION, SOLUTION INTRAVENOUS at 01:08

## 2023-08-03 RX ADMIN — SODIUM CHLORIDE: 9 INJECTION, SOLUTION INTRAVENOUS at 05:08

## 2023-08-03 RX ADMIN — IPRATROPIUM BROMIDE AND ALBUTEROL SULFATE 3 ML: 2.5; .5 SOLUTION RESPIRATORY (INHALATION) at 07:08

## 2023-08-03 RX ADMIN — BENZOCAINE 1 CAN: 200 SPRAY DENTAL; ORAL; PERIODONTAL at 01:08

## 2023-08-03 NOTE — ANESTHESIA POSTPROCEDURE EVALUATION
Anesthesia Post Evaluation    Patient: Abilio Carroll    Procedure(s) Performed: * No procedures listed *    Final Anesthesia Type: MAC      Patient location during evaluation: PACU  Patient participation: Yes- Able to Participate  Level of consciousness: awake and alert and oriented  Post-procedure vital signs: reviewed and stable  Pain management: adequate  Airway patency: patent  TERI mitigation strategies: Multimodal analgesia  PONV status at discharge: No PONV  Anesthetic complications: no      Cardiovascular status: hemodynamically stable  Respiratory status: unassisted and spontaneous ventilation  Hydration status: euvolemic  Follow-up needed           Vitals Value Taken Time   /70 08/03/23 1427   Temp  08/03/23 1434   Pulse 74 08/03/23 1427   Resp 19 08/03/23 1426   SpO2 89 % 08/03/23 1426   Vitals shown include unvalidated device data.      No case tracking events are documented in the log.      Pain/Rachel Score: Rachel Score: 10 (8/3/2023  2:14 PM)

## 2023-08-03 NOTE — ASSESSMENT & PLAN NOTE
"Patient's FSGs are uncontrolled due to hyperglycemia on current medication regimen.  Last A1c reviewed- No results found for: "LABA1C", "HGBA1C"  Most recent fingerstick glucose reviewed- No results for input(s): "POCTGLUCOSE" in the last 24 hours.  Current correctional scale  Medium  Maintain anti-hyperglycemic dose as follows-   Antihyperglycemics (From admission, onward)    Start     Stop Route Frequency Ordered    08/03/23 0400  insulin aspart U-100 injection 1-10 Units         -- SubQ Every 4 hours 08/03/23 0139        Hold Oral hypoglycemics while patient is in the hospital.  "

## 2023-08-03 NOTE — ASSESSMENT & PLAN NOTE
Presented with H/H of 5.3/22 with MCV in 70s  Reports have been seeing bright red blood in stool and dark black tarry stools at times  FOBT pending  2 units of pRBC ordered and pending  GI consulted  NPO

## 2023-08-03 NOTE — ASSESSMENT & PLAN NOTE
"Patient with clinical diagnosis of heart failure on the exam with crackles in the lung and infiltrates on chest xray, distended abd, edema in LE and SOB  Takes lasix 40 mg BID PO at home, will continue with lasix 40 mg BID IV here  Continue home spironolactone 25 mg   Continue Coreg with half of the home dose  Echo pending  Strict I&O  Weight daily   Cardiac monitoring  He most likely has Right sided heart failure with pHTN and would benefit from a RHC at some point    Echo from October 2022    Interpretation Summary  · The left ventricle is normal in size with normal systolic function.  · The estimated ejection fraction is 65%.  · Normal left ventricular diastolic function.  · Moderate right ventricular enlargement.  · Moderate right atrial enlargement.  · Moderate mitral regurgitation.  · Mild tricuspid regurgitation.  · Elevated central venous pressure (15 mmHg).  · The estimated PA systolic pressure is 46 mmHg.  · There is pulmonary hypertension.  · Trivial pericardial effusion.  . Continue Beta Blocker, Furosemide and Aldactone and monitor clinical status closely. Monitor on telemetry. Patient is on CHF pathway.  Monitor strict Is&Os and daily weights.  Place on fluid restriction of 1.5 L. Cardiology has not been any consulted. Continue to stress to patient importance of self efficacy and  on diet for CHF. Last BNP reviewed- and noted below No results for input(s): "BNP", "BNPTRIAGEBLO" in the last 168 hours..  "

## 2023-08-03 NOTE — DISCHARGE INSTRUCTIONS
Procedure Date  8/3/23     Impression  Overall Impression:   Multiple small, ulcers in the prepyloric region with clean base (Tanner III)  Moderate scarred mucosa in the pylorus  The upper third of the esophagus, middle third of the esophagus, lower third of the esophagus, Z-line, cardia, fundus of the stomach, body of the stomach, incisura, duodenal bulb, 1st part of the duodenum and 2nd part of the duodenum appeared normal.  No varices, esophagitis, dieulafoy, or AVM; no blood or active bleeding     Recommendation    Await pathology results     Suspect bleeding was related to gastric ulcers which have shown interval improvement with PPI therapy  Recommend PPI BID for 8 weeks then daily  Recommend breath or stool antigen testing for H pylori and treatment with quadruple therapy if positive; biopsies not taken today due to hypoxia during the procedure  Suspect scarring in the antrum is related to prior peptic ulcer disease  Avoid NSAIDs  Advance diet as tolerated  If he has further overt bleeding or significant drop in H&H, can consider colonoscopy; otherwise, will need screening colonoscopy as outpatient       Outcome of procedure: successful EGD  Disposition: patient to recovery following procedure; return to [facility] when appropriate parameters met  Provisions for follow up: please call my office for any unexpected symptoms like chest or abdominal pain or bleeding following your procedure.  Final Diagnosis: PUD

## 2023-08-03 NOTE — ASSESSMENT & PLAN NOTE
Currently well controlled  Continue home norvasc, half of coreg home dose, spironolactone   Changed lasix 40 mg BID PO to 40 mg IV BID

## 2023-08-03 NOTE — CONSULTS
"Gastroenterology Consult Note    Chief Complaint: Acute blood loss anemia    Consulted by:   Dr. Wells    HPI:  Abilio Carroll is a 62 y.o. obese AAM with COPD, TERI, Pulm HTN, and chronic alcohol abuse that presents with symptomatic anemia. Patient reports having some BRB on the tissue after constipated BM, but otherwise denies overt GI bleeding. Denies N/V, NSAIDs, hematemesis, abdominal pain, melena. He drinks 6-12 beers/day for years. No known h/o cirrhosis or prior GI bleed. He has had screening colonoscopy reports ~10 years ago on the [facility]. No [facility] GI related cancer. In the ER, he was found to have Hgb 5.3, down from baseline of 9-10 and was given 2u pRBC.     Review of Systems   Constitutional:  Positive for malaise/fatigue. Negative for weight loss.   Respiratory:  Positive for shortness of breath.    Gastrointestinal:  Positive for blood in stool. Negative for abdominal pain, constipation, diarrhea, heartburn, melena, nausea and vomiting.   All other systems reviewed and are negative.      Past Medical History:   Diagnosis Date    Cardiomyopathy     COPD (chronic obstructive pulmonary disease)     Diabetes mellitus, type 2     Gout     Heart failure, unspecified     Hypertension     Obstructive sleep apnea on CPAP     Osteoarthritis      History reviewed. No pertinent surgical history.  History reviewed. No pertinent family history.    OBJECTIVE:  /68   Pulse 75   Temp 97.3 °F (36.3 °C)   Resp 16   Ht 6' 1" (1.854 m)   Wt (!) 151 kg (332 lb 14.3 oz)   SpO2 95%   BMI 43.92 kg/m²   GEN: chronically ill appearing, obese, cooperative, NAD  HEENT: NCAT, OP benign, MMM  NECK: Supple, no LAD  CV: normal rate, regular rhythm  RESP: CTA bilaterally, unlabored  ABD: NABS, ND, NT, soft, no guarding  EXT: No clubbing, cyanosis, or edema.  SKIN: Warm and dry  NEURO: AAO x4. Afocal.    LABS:  CMP  Sodium   Date Value Ref Range Status   08/02/2023 137 136 - 145 mmol/L Final     Potassium   Date Value Ref " Range Status   08/02/2023 3.8 3.5 - 5.1 mmol/L Final     Chloride   Date Value Ref Range Status   08/02/2023 99 98 - 107 mmol/L Final     CO2   Date Value Ref Range Status   08/02/2023 28 21 - 32 mmol/L Final     Glucose   Date Value Ref Range Status   08/02/2023 130 (H) 74 - 106 mg/dL Final     BUN   Date Value Ref Range Status   08/02/2023 25 (H) 7 - 18 mg/dL Final     Creatinine   Date Value Ref Range Status   08/02/2023 1.40 (H) 0.70 - 1.30 mg/dL Final     Calcium   Date Value Ref Range Status   08/02/2023 7.9 (L) 8.5 - 10.1 mg/dL Final     Total Protein   Date Value Ref Range Status   08/02/2023 7.2 6.4 - 8.2 g/dL Final     Albumin   Date Value Ref Range Status   08/02/2023 3.2 (L) 3.5 - 5.0 g/dL Final     Bilirubin, Total   Date Value Ref Range Status   08/02/2023 0.4 >0.0 - 1.2 mg/dL Final     Alk Phos   Date Value Ref Range Status   08/02/2023 62 45 - 115 U/L Final     AST   Date Value Ref Range Status   08/02/2023 13 (L) 15 - 37 U/L Final     ALT   Date Value Ref Range Status   08/02/2023 25 16 - 61 U/L Final     Anion Gap   Date Value Ref Range Status   08/02/2023 14 7 - 16 mmol/L Final     eGFR   Date Value Ref Range Status   08/02/2023 58 (L) >=60 mL/min/1.73m2 Final     Recent Results (from the past 336 hour(s))   CBC with Differential    Collection Time: 08/02/23 11:51 PM   Result Value Ref Range    WBC 8.56 4.50 - 11.00 K/uL    Hemoglobin 5.3 (LL) 13.5 - 18.0 g/dL    Hematocrit 22.0 (L) 40.0 - 54.0 %    Platelet Count 188 150 - 400 K/uL     Lab Results   Component Value Date    INR 1.33 08/03/2023    INR 1.09 07/30/2020       IMAGING:  Imaging Results              US Abdomen Limited (Final result)  Result time 08/03/23 07:38:34      Final result by Vic Jacome MD (08/03/23 07:38:34)                   Impression:      Prominent gallbladder contraction    No focal hepatic abnormality    Pancreas obscured by bowel gas      Electronically signed by: Vic  Naa  Date:    08/03/2023  Time:    07:38               Narrative:    EXAMINATION:  Abdominal ultrasound limited to the right upper quadrant    CLINICAL HISTORY:  Liver. looking for cirrhosis.;    TECHNIQUE:  Targeted ultrasound of the liver and right upper quadrant was performed.  Real-time ultrasound images are captured and archived.    COMPARISON:  None    FINDINGS:  Liver: Normal in size at 17.3 cm length.  Homogeneous parenchymal echotexture. No focal lesions.  There is hepatopedal flow in the portal vein.    Gallbladder: Gallbladder is prominently contracted.  No gross gallstones.  No sonographic Aguiar sign.    Biliary system: Common duct normal in caliber at 5 mm. No intrahepatic ductal dilatation.    Pancreas: Obscured by bowel gas    Right kidney: Length is 12.2 cm. No stones, solid mass, or hydronephrosis.    Aorta: No aneurysm.    Inferior vena cava: Normal in appearance.    Miscellaneous: No ascites.                                       CT Head Without Contrast (Final result)  Result time 08/03/23 07:43:43      Final result by Vic Jacome MD (08/03/23 07:43:43)                   Impression:      No acute intracranial process    Periventricular small vessel disease      Electronically signed by: Vic Jacome  Date:    08/03/2023  Time:    07:43               Narrative:    EXAMINATION:  CT head without contrast    CLINICAL HISTORY:  Neuro deficit, acute, stroke suspected;    TECHNIQUE:  Transaxial CT sections were obtained through the brain without contrast.    The exam was also reviewed by Miguel.    The CT examination was performed using one or more of the following dose reduction techniques: Automated exposure control, adjustment of the mA and kV according to patient's size, use of acute or iterative reconstruction techniques.    COMPARISON:  No previous head CT available    FINDINGS:  The ventricles are midline in position without evidence of hydrocephalus. There is no mass or  area of parenchymal hemorrhage. There is no gross CT evidence of acute cortical stroke.  There is a small amount of patchy decreased density in the periventricular white matter without mass effect compatible with changes of small vessel disease.  There is no extra-axial hematoma. The partially visualized sinuses are generally clear. There is no obvious skull fracture.                                       X-Ray Chest 1 View (Final result)  Result time 08/03/23 07:48:30      Final result by Gerard Kc II, MD (08/03/23 07:48:30)                   Impression:      Findings suggest cardiac decompensation and / or pneumonitis.      Electronically signed by: Gerard Kc  Date:    08/03/2023  Time:    07:48               Narrative:    EXAMINATION:  XR CHEST 1 VIEW    CLINICAL HISTORY:  shortness of breath;    COMPARISON:  3 October 2022    TECHNIQUE:  XR CHEST 1 VIEW    FINDINGS:  The heart and mediastinum are stable in size and configuration.  The pulmonary vascularity is slightly increased with bilateral increased interstitial lung density.  No other lung infiltrates, effusions, pneumothorax or other abnormality is demonstrated.                                    ASSESSMENT:    62 y.o. obese AAM with COPD, TERI, Pulm HTN, and chronic alcohol abuse that presents with symptomatic anemia.     PLAN:    Acute GI Hemorrhage  - Hgb 5.3 (10), BUN 27, alcohol abuse, 2u pRBC transfusion; Plt, INR ok   - PUD vs esophagitis/gastritis vs varices  - agree with PPI, monitoring H&H, pRBC as needed  - NPO for EGD today       Healthcare maintenance  - if not indicated this admission, needs screening colonoscopy as outpatient       Thank you for the consult and including me in the care of this patient. Please call me with questions.     Wayne Wang MD  Gastroenterology

## 2023-08-03 NOTE — ASSESSMENT & PLAN NOTE
Ciwa score of 1 but patient had his last drink around 4 hours ago.   He drinks 6-12 drinks per night   Will start him on librium 25 mg Q6H  Lorazepam 1 mg once PRN for withdrawal or seizure  IV thiamine, folate and PO multivitamin   Eyes were yellow but liver enzymes were normal  Will do hepatitis panel  Will do liver US

## 2023-08-03 NOTE — SUBJECTIVE & OBJECTIVE
Past Medical History:   Diagnosis Date    Cardiomyopathy     COPD (chronic obstructive pulmonary disease)     Diabetes mellitus, type 2     Gout     Heart failure, unspecified     Hypertension     Obstructive sleep apnea on CPAP     Osteoarthritis        No past surgical history on file.    Review of patient's allergies indicates:  No Known Allergies    No current facility-administered medications on file prior to encounter.     Current Outpatient Medications on File Prior to Encounter   Medication Sig    ACCU-CHEK LAILA CONTROL SOLN Soln     albuterol (PROVENTIL/VENTOLIN HFA) 90 mcg/actuation inhaler Inhale 2 puffs into the lungs every 6 (six) hours as needed for Wheezing. Rescue    albuterol-ipratropium (DUO-NEB) 2.5 mg-0.5 mg/3 mL nebulizer solution INHALE THE CONTENTS OF 1 VIAL VIA NEBULIZER EVERY 6 HOURS AS NEEDED FOR WHEEZING. RESCUE Strength: 0.5 MG-3 MG(2.5 mg base)/3 mL    allopurinoL (ZYLOPRIM) 100 MG tablet Take 100 mg by mouth once daily.    amLODIPine (NORVASC) 10 MG tablet Take 1 tablet (10 mg total) by mouth once daily.    ascorbate calcium, vitamin C, 500 mg Tab Take 1 tablet by mouth once daily.    aspirin 81 MG Chew Take 81 mg by mouth once daily.    atorvastatin (LIPITOR) 40 MG tablet Take 40 mg by mouth once daily.    blood sugar diagnostic (BLOOD GLUCOSE TEST) Strp by Misc.(Non-Drug; Combo Route) route.    carvediloL (COREG) 12.5 MG tablet Take 1 tablet (12.5 mg total) by mouth 2 (two) times daily with meals.    cholecalciferol, vitamin D3, (VITAMIN D3) 25 mcg (1,000 unit) capsule Take 1,000 Units by mouth once daily.    colchicine (COLCRYS) 0.6 mg tablet Take 0.6 mg by mouth once daily.    cyanocobalamin (VITAMIN B-12) 1000 MCG tablet Take 100 mcg by mouth once daily.    ferrous sulfate (FEOSOL) 325 mg (65 mg iron) Tab tablet Take 325 mg by mouth daily with breakfast.    furosemide (LASIX) 40 MG tablet Take 1 tablet (40 mg total) by mouth 2 (two) times daily.    ibuprofen (ADVIL,MOTRIN) 200 MG  tablet Take 200 mg by mouth every 6 (six) hours as needed for Pain.    indomethacin (INDOCIN) 50 MG capsule TAKE 1 CAPSULE THREE TIMES DAILY AS NEEDED FOR GOUT (DO NOT TAKE FOR MORE THAN 5 DAYS AT A TIME) Strength: 50 mg      HOLD THIS MEDICATION UNTIL YOUR FOLLOW UP WITH YOUR PCP    insulin aspart U-100 (NOVOLOG) 100 unit/mL (3 mL) InPn pen 4 units in the morning before breakfast   If sugar is above 150 inject 4 more units in the PM. SQ    insulin glargine U-300 conc (TOUJEO MAX U-300 SOLOSTAR) 300 unit/mL (3 mL) insulin pen Inject 40 Units into the skin once daily.    SITagliptin (JANUVIA) 50 MG Tab Take 50 mg by mouth every morning.    spironolactone (ALDACTONE) 25 MG tablet Take 25 mg by mouth once daily.    TRUE METRIX AIR GLUCOSE METER kit     TRUEPLUS LANCETS 33 gauge Misc      Family History    None       Tobacco Use    Smoking status: Never    Smokeless tobacco: Never   Substance and Sexual Activity    Alcohol use: Never    Drug use: Never    Sexual activity: Not on file     Review of Systems   Constitutional:  Positive for activity change and fatigue. Negative for chills and fever.   HENT:  Positive for congestion.    Respiratory:  Positive for cough, shortness of breath and wheezing.    Cardiovascular:  Positive for leg swelling. Negative for chest pain and palpitations.   Gastrointestinal:  Positive for abdominal distention, anal bleeding, blood in stool and constipation. Negative for abdominal pain, diarrhea, nausea, rectal pain and vomiting.   Genitourinary:  Negative for difficulty urinating.   Musculoskeletal:  Negative for arthralgias and back pain.   Skin:  Negative for color change.   Neurological:  Positive for dizziness, weakness and light-headedness. Negative for seizures, syncope, facial asymmetry, speech difficulty and numbness.   Hematological:  Negative for adenopathy.   Psychiatric/Behavioral:  Negative for agitation.      Objective:     Vital Signs (Most Recent):  Temp: 98.7 °F (37.1 °C)  (08/03/23 0154)  Pulse: 78 (08/03/23 0204)  Resp: 18 (08/03/23 0204)  BP: (!) 137/50 (08/03/23 0154)  SpO2: (!) 94 % (08/03/23 0154) Vital Signs (24h Range):  Temp:  [98.7 °F (37.1 °C)] 98.7 °F (37.1 °C)  Pulse:  [70-78] 78  Resp:  [11-19] 18  SpO2:  [92 %-96 %] 94 %  BP: (117-137)/(45-55) 137/50     Weight: (!) 152 kg (335 lb 3.2 oz)  Body mass index is 44.22 kg/m².     Physical Exam  Vitals and nursing note reviewed.   Constitutional:       General: He is not in acute distress.     Appearance: Normal appearance. He is normal weight. He is ill-appearing. He is not toxic-appearing.   HENT:      Head: Normocephalic and atraumatic.      Right Ear: External ear normal.      Left Ear: External ear normal.      Nose: Nose normal. No congestion or rhinorrhea.      Mouth/Throat:      Mouth: Mucous membranes are moist.   Eyes:      General: No visual field deficit.     Conjunctiva/sclera: Conjunctivae normal.      Comments: Eyes are juandice    Neck:      Vascular: No carotid bruit.   Cardiovascular:      Rate and Rhythm: Normal rate and regular rhythm.      Pulses: Normal pulses.      Heart sounds: Murmur heard.      Systolic murmur is present with a grade of 2/6.   Pulmonary:      Effort: Pulmonary effort is normal. No respiratory distress.      Breath sounds: Wheezing and rales present.   Abdominal:      General: Bowel sounds are normal. There is distension.      Palpations: There is no mass.      Tenderness: There is no abdominal tenderness. There is no right CVA tenderness or guarding.      Hernia: A hernia is present.   Musculoskeletal:         General: Swelling present. No tenderness.      Cervical back: No tenderness.      Right lower leg: Edema present.      Left lower leg: Edema present.   Skin:     General: Skin is warm.      Coloration: Skin is not jaundiced.      Findings: No bruising or lesion.   Neurological:      Mental Status: He is alert and oriented to person, place, and time. Mental status is at  baseline.      Cranial Nerves: Cranial nerves 2-12 are intact. No cranial nerve deficit, dysarthria or facial asymmetry.      Sensory: Sensation is intact. No sensory deficit.      Motor: No weakness, tremor, atrophy, abnormal muscle tone or seizure activity.      Comments: Neuro exam did not show any weakness. Both UE and LE have equal strenght.  Cranial nerves are all intact.   Patient has slight weakness in the right hand that he said it is due to gout. He is currently having a gout attack on right hand.    Psychiatric:         Behavior: Behavior normal.                Significant Labs: All pertinent labs within the past 24 hours have been reviewed.    Significant Imaging: I have reviewed all pertinent imaging results/findings within the past 24 hours.

## 2023-08-03 NOTE — ED NOTES
Pt received wet. Cleaned and changed into a grown. [place] given. Speech slurred. No acute distress noted. Urinal placed in reach. Call light in reach. Bed low. Wheels locked.   No

## 2023-08-03 NOTE — ANESTHESIA PREPROCEDURE EVALUATION
08/03/2023  Abilio Carroll is a 62 y.o., male.      Pre-op Assessment    I have reviewed the Patient Summary Reports.     I have reviewed the Nursing Notes. I have reviewed the NPO Status.   I have reviewed the Medications.     Review of Systems  Anesthesia Hx:  No problems with previous Anesthesia  Denies Family Hx of Anesthesia complications.   Denies Personal Hx of Anesthesia complications.   Social:  Smoker, Alcohol Use + illicit drug use, heavy alcohol use, poor historian, non compliant with management of chronic conditions   Hematology/Oncology:         -- Anemia: Hematology Comments: Critical blood loss anemia from presumed GI bleed requiring transfusion of pRBCs   Cardiovascular:   Exercise tolerance: poor Hypertension CHF hyperlipidemia    Pulmonary:   COPD, moderate Sleep Apnea Non compliant with CPAP, likely pulm HTN   Renal/:   Chronic Renal Disease, CKD    Hepatic/GI:   UGIB with critical anemia requiring transfusion of pRBCs in the setting of acute on chronic CHF   Musculoskeletal:   Arthritis     Endocrine:   Diabetes, poorly controlled, type 2  Morbid Obesity / BMI > 40      Physical Exam  General: Well nourished, Cooperative and Alert    Airway:  Mallampati: III   Mouth Opening: Normal  TM Distance: Normal  Tongue: Normal  Neck ROM: Normal ROM    Chest/Lungs:  Normal Respiratory Rate, Rales, Basilar    Heart:  Rate: Normal  Rhythm: Regular Rhythm        Chemistry        Component Value Date/Time     08/02/2023 2352    K 3.8 08/02/2023 2352    CL 99 08/02/2023 2352    CO2 28 08/02/2023 2352    BUN 25 (H) 08/02/2023 2352    CREATININE 1.40 (H) 08/02/2023 2352     (H) 08/02/2023 2352        Component Value Date/Time    CALCIUM 7.9 (L) 08/02/2023 2352    ALKPHOS 62 08/02/2023 2352    AST 13 (L) 08/02/2023 2352    ALT 25 08/02/2023 2352    BILITOT 0.4 08/02/2023 2352    ESTGFRAFRICA  58 08/02/2020 0430    EGFRNONAA 48 08/02/2020 0430        Lab Results   Component Value Date    WBC 8.56 08/02/2023    HGB 7.3 (L) 08/03/2023    HCT 22.0 (L) 08/02/2023     08/02/2023     Results for orders placed or performed during the hospital encounter of 10/03/22   EKG 12-lead    Collection Time: 10/03/22  4:57 PM    Narrative    Test Reason : R06.02,    Vent. Rate : 085 BPM     Atrial Rate : 000 BPM     P-R Int : 222 ms          QRS Dur : 078 ms      QT Int : 350 ms       P-R-T Axes : 051 016 069 degrees     QTc Int : 394 ms    Sinus rhythm  with 1st degree A-V block  Poor R wave progression  Abnormal ECG    Confirmed by Jose Paulino MD ([id number]) on 10/5/2022 4:25:01 AM    Referred By: [email or web address]   SELF           Confirmed By:Jose Paulino MD     TTE 10/04/2022  Summary     The left ventricle is normal in size with normal systolic function.   The estimated ejection fraction is 65%.   Normal left ventricular diastolic function.   Moderate right ventricular enlargement.   Moderate right atrial enlargement.   Moderate mitral regurgitation.   Mild tricuspid regurgitation.   Elevated central venous pressure (15 mmHg).   The estimated PA systolic pressure is 46 mmHg.   There is pulmonary hypertension.   Trivial pericardial effusion.        Anesthesia Plan  Type of Anesthesia, risks & benefits discussed:    Anesthesia Type: MAC  Intra-op Monitoring Plan: Standard ASA Monitors  Post Op Pain Control Plan: multimodal analgesia  Induction:  IV  Informed Consent: Informed consent signed with the Patient and all parties understand the risks and agree with anesthesia plan.  All questions answered.   ASA Score: 3  Day of Surgery Review of History & Physical: H&P Update referred to the surgeon/provider.I have interviewed and examined the patient. I have reviewed the patient's H&P dated: There are no significant changes.     Ready For Surgery From Anesthesia Perspective.     .

## 2023-08-03 NOTE — ASSESSMENT & PLAN NOTE
Presented with H/H of 5.3/22 with MCV in 70s  Reports have been seeing bright red blood in stool and dark black tarry stools at times  FOBT pending  Protonix loading and then 40 mg BID  2 units of pRBC ordered and pending  GI consulted  NPO    8/3: GI evaluation today.

## 2023-08-03 NOTE — PLAN OF CARE
[facility]  Initial Discharge Assessment       Primary Care Provider: Brandt Rucker MD    Admission Diagnosis: Shortness of breath [R06.02]  Acute blood loss anemia [D62]  CHF (congestive heart failure) [I50.9]  Chest pain [R07.9]    Admission Date: 8/2/2023  Expected Discharge Date:     Transition of Care Barriers: None    Payor: HU[employer] MANAGED MEDICARE / Plan: HU[employer] MEDICARE PPO / Product Type: Medicare Advantage /     Extended Emergency Contact Information  Primary Emergency Contact: FRED BLAKE  Mobile Phone: [phone]  Relation: Mother  Preferred language: English   needed? No  Secondary Emergency Contact: DiogenesTiffanie minRupa  Mobile Phone: [phone]  Relation: Sister    Discharge Plan A: Home, Home with family  Discharge Plan B: Home      [facility] [place] [facility] [address] [place] [address]  Phone: [phone] Fax: [phone]      Initial Assessment (most recent)       Adult Discharge Assessment - 08/03/23 1133          Discharge Assessment    Assessment Type Discharge Planning Assessment     Confirmed/corrected address, phone number and insurance Yes     Confirmed Demographics Correct on Facesheet     Source of Information patient     Communicated TONG with patient/caregiver Date not available/Unable to determine     People in Home other relative(s)     Facility Arrived From: home     Do you expect to return to your current living situation? Yes     Do you have help at home or someone to help you manage your care at home? No     Prior to hospitilization cognitive status: Unable to Assess     Current cognitive status: Unable to Assess     Equipment Currently Used at Home oxygen   [facility]    Readmission within 30 days? No     Patient currently being followed by outpatient case management? No     Do you currently have service(s) that help you manage your care at home? No     Do you take prescription medications?  Yes     Do you have prescription coverage? Yes     Coverage [employer]     Do you have any problems affording any of your prescribed medications? No     Is the patient taking medications as prescribed? yes     How do you get to doctors appointments? car, drives self;family or friend will provide     Are you on dialysis? No     Do you take coumadin? No     Discharge Plan A Home;Home with family     Discharge Plan B Home     DME Needed Upon Discharge  none     Discharge Plan discussed with: Patient     Transition of Care Barriers None        Physical Activity    On average, how many days per week do you engage in moderate to strenuous exercise (like a brisk walk)? 0 days     On average, how many minutes do you engage in exercise at this level? 0 min        Financial Resource Strain    How hard is it for you to pay for the very basics like food, housing, medical care, and heating? Not hard at all        Housing Stability    In the last 12 months, was there a time when you were not able to pay the mortgage or rent on time? No     In the last 12 months, how many places have you lived? 1     In the last 12 months, was there a time when you did not have a steady place to sleep or slept in a shelter (including now)? No        Transportation Needs    In the past 12 months, has lack of transportation kept you from medical appointments or from getting medications? No     In the past 12 months, has lack of transportation kept you from meetings, work, or from getting things needed for daily living? No        Food Insecurity    Within the past 12 months, you worried that your food would run out before you got the money to buy more. Never true     Within the past 12 months, the food you bought just didn't last and you didn't have money to get more. Never true        Stress    Do you feel stress - tense, restless, nervous, or anxious, or unable to sleep at night because your mind is troubled all the time - these days? Not at all         Social Connections    In a typical week, how many times do you talk on the phone with family, friends, or neighbors? More than three times a week     How often do you get together with friends or relatives? More than three times a week     Are you , , , , never , or living with a partner? Never         Alcohol Use    Q1: How often do you have a drink containing alcohol? Never     Q2: How many drinks containing alcohol do you have on a typical day when you are drinking? Patient does not drink     Q3: How often do you have six or more drinks on one occasion? Never                      Spoke with pt in room. Pt lives home with nephew, plans to return at [place]. Pt has oxygen at home with the medical store. Reviewed chart, 0 dc needs noted. Will follow consults.

## 2023-08-03 NOTE — H&P
[facility]  Hospital Medicine  History & Physical    Patient Name: Abilio Carroll  MRN: [id number]  Patient Class: IP- Inpatient  Admission Date: 8/2/2023  Attending Physician: Jorje Dodge MD   Primary Care Provider: Brandt Rucker MD         Patient information was obtained from patient and ER records.     Subjective:     Principal Problem:Acute blood loss anemia    Chief Complaint:   Chief Complaint   Patient presents with    Hand Problem        HPI: 62 year old male, extremely a poor historian with PMHx of TERI, COPD and Alcohol abuse presented to the [facility] via EMS due to weakness. Patient reports around four hours ago after having 4 beers and a marijuana joint he started feeling suddenly weak and fatigue. He could not hold any objects in his hand and would drop the food. He couldn't grab a glass of water to drink. He could not ambulate because the right leg was weak. He reports the hands and UE were both weak to the same degree. He reports the entire body was shaky and jumpy.Patient reports he has experienced episodes of this before that would only last a few seconds but now this is the first time it was constant and lasted longer. Patient reports he was feeling dizziness and felt like he is about to pass out. Room was spinning around his head. No ringing in the ear. Vision was complety ok with no deficits. Did not lose conciousness. But he did loss urine without even noticing it. Patient denies sweating, no fever, no chill, no back pain, no chest pain, no abdominal pain. He reports stool is sometimes bloody and sometimes black and has chronic constipation. He has swelling of scrotum, edema in the legs. Chronic SOB and has home oxygen he reports he wears at nights. Currently at spO2 85% on room air and 94% with 3L NC.    Patient is a poor historian, does not know a single medication he takes and has no idea about his medical history. He has HTN. Patient reports he has seen a heart  doctor 5 years ago at [facility] but has not seen one since. He has Diabetes and takes 40 units long acting in the morning and SSI throughout the day. Had colonoscopy once before but doesn't remember how many years. No heart burns and never had EGD. TERI with CPAP but doesn't wear CPAP.     Current 40 pack year smoking Hx. Smokes marijuana. Drinks alcohol everyday 6-12 drinks per day.     ED course:   H/H 5.3/22  Cr 1.4  proBNP >400      Past Medical History:   Diagnosis Date    Cardiomyopathy     COPD (chronic obstructive pulmonary disease)     Diabetes mellitus, type 2     Gout     Heart failure, unspecified     Hypertension     Obstructive sleep apnea on CPAP     Osteoarthritis        No past surgical history on file.    Review of patient's allergies indicates:  No Known Allergies    No current facility-administered medications on file prior to encounter.     Current Outpatient Medications on File Prior to Encounter   Medication Sig    ACCU-CHEK LAILA CONTROL SOLN Soln     albuterol (PROVENTIL/VENTOLIN HFA) 90 mcg/actuation inhaler Inhale 2 puffs into the lungs every 6 (six) hours as needed for Wheezing. Rescue    albuterol-ipratropium (DUO-NEB) 2.5 mg-0.5 mg/3 mL nebulizer solution INHALE THE CONTENTS OF 1 VIAL VIA NEBULIZER EVERY 6 HOURS AS NEEDED FOR WHEEZING. RESCUE Strength: 0.5 MG-3 MG(2.5 mg base)/3 mL    allopurinoL (ZYLOPRIM) 100 MG tablet Take 100 mg by mouth once daily.    amLODIPine (NORVASC) 10 MG tablet Take 1 tablet (10 mg total) by mouth once daily.    ascorbate calcium, vitamin C, 500 mg Tab Take 1 tablet by mouth once daily.    aspirin 81 MG Chew Take 81 mg by mouth once daily.    atorvastatin (LIPITOR) 40 MG tablet Take 40 mg by mouth once daily.    blood sugar diagnostic (BLOOD GLUCOSE TEST) Strp by Misc.(Non-Drug; Combo Route) route.    carvediloL (COREG) 12.5 MG tablet Take 1 tablet (12.5 mg total) by mouth 2 (two) times daily with meals.    cholecalciferol, vitamin D3, (VITAMIN D3) 25  mcg (1,000 unit) capsule Take 1,000 Units by mouth once daily.    colchicine (COLCRYS) 0.6 mg tablet Take 0.6 mg by mouth once daily.    cyanocobalamin (VITAMIN B-12) 1000 MCG tablet Take 100 mcg by mouth once daily.    ferrous sulfate (FEOSOL) 325 mg (65 mg iron) Tab tablet Take 325 mg by mouth daily with breakfast.    furosemide (LASIX) 40 MG tablet Take 1 tablet (40 mg total) by mouth 2 (two) times daily.    ibuprofen (ADVIL,MOTRIN) 200 MG tablet Take 200 mg by mouth every 6 (six) hours as needed for Pain.    indomethacin (INDOCIN) 50 MG capsule TAKE 1 CAPSULE THREE TIMES DAILY AS NEEDED FOR GOUT (DO NOT TAKE FOR MORE THAN 5 DAYS AT A TIME) Strength: 50 mg      HOLD THIS MEDICATION UNTIL YOUR FOLLOW UP WITH YOUR PCP    insulin aspart U-100 (NOVOLOG) 100 unit/mL (3 mL) InPn pen 4 units in the morning before breakfast   If sugar is above 150 inject 4 more units in the PM. SQ    insulin glargine U-300 conc (TOUJEO MAX U-300 SOLOSTAR) 300 unit/mL (3 mL) insulin pen Inject 40 Units into the skin once daily.    SITagliptin (JANUVIA) 50 MG Tab Take 50 mg by mouth every morning.    spironolactone (ALDACTONE) 25 MG tablet Take 25 mg by mouth once daily.    TRUE METRIX AIR GLUCOSE METER kit     TRUEPLUS LANCETS 33 gauge Misc      Family History    None       Tobacco Use    Smoking status: Never    Smokeless tobacco: Never   Substance and Sexual Activity    Alcohol use: Never    Drug use: Never    Sexual activity: Not on file     Review of Systems   Constitutional:  Positive for activity change and fatigue. Negative for chills and fever.   HENT:  Positive for congestion.    Respiratory:  Positive for cough, shortness of breath and wheezing.    Cardiovascular:  Positive for leg swelling. Negative for chest pain and palpitations.   Gastrointestinal:  Positive for abdominal distention, anal bleeding, blood in stool and constipation. Negative for abdominal pain, diarrhea, nausea, rectal pain and vomiting.   Genitourinary:   Negative for difficulty urinating.   Musculoskeletal:  Negative for arthralgias and back pain.   Skin:  Negative for color change.   Neurological:  Positive for dizziness, weakness and light-headedness. Negative for seizures, syncope, facial asymmetry, speech difficulty and numbness.   Hematological:  Negative for adenopathy.   Psychiatric/Behavioral:  Negative for agitation.      Objective:     Vital Signs (Most Recent):  Temp: 98.7 °F (37.1 °C) (08/03/23 0154)  Pulse: 78 (08/03/23 0204)  Resp: 18 (08/03/23 0204)  BP: (!) 137/50 (08/03/23 0154)  SpO2: (!) 94 % (08/03/23 0154) Vital Signs (24h Range):  Temp:  [98.7 °F (37.1 °C)] 98.7 °F (37.1 °C)  Pulse:  [70-78] 78  Resp:  [11-19] 18  SpO2:  [92 %-96 %] 94 %  BP: (117-137)/(45-55) 137/50     Weight: (!) 152 kg (335 lb 3.2 oz)  Body mass index is 44.22 kg/m².     Physical Exam  Vitals and nursing note reviewed.   Constitutional:       General: He is not in acute distress.     Appearance: Normal appearance. He is normal weight. He is ill-appearing. He is not toxic-appearing.   HENT:      Head: Normocephalic and atraumatic.      Right Ear: External ear normal.      Left Ear: External ear normal.      Nose: Nose normal. No congestion or rhinorrhea.      Mouth/Throat:      Mouth: Mucous membranes are moist.   Eyes:      General: No visual field deficit.     Conjunctiva/sclera: Conjunctivae normal.      Comments: Eyes are juandice    Neck:      Vascular: No carotid bruit.   Cardiovascular:      Rate and Rhythm: Normal rate and regular rhythm.      Pulses: Normal pulses.      Heart sounds: Murmur heard.      Systolic murmur is present with a grade of 2/6.   Pulmonary:      Effort: Pulmonary effort is normal. No respiratory distress.      Breath sounds: Wheezing and rales present.   Abdominal:      General: Bowel sounds are normal. There is distension.      Palpations: There is no mass.      Tenderness: There is no abdominal tenderness. There is no right CVA tenderness or  guarding.      Hernia: A hernia is present.   Musculoskeletal:         General: Swelling present. No tenderness.      Cervical back: No tenderness.      Right lower leg: Edema present.      Left lower leg: Edema present.   Skin:     General: Skin is warm.      Coloration: Skin is not jaundiced.      Findings: No bruising or lesion.   Neurological:      Mental Status: He is alert and oriented to person, place, and time. Mental status is at baseline.      Cranial Nerves: Cranial nerves 2-12 are intact. No cranial nerve deficit, dysarthria or facial asymmetry.      Sensory: Sensation is intact. No sensory deficit.      Motor: No weakness, tremor, atrophy, abnormal muscle tone or seizure activity.      Comments: Neuro exam did not show any weakness. Both UE and LE have equal strenght.  Cranial nerves are all intact.   Patient has slight weakness in the right hand that he said it is due to gout. He is currently having a gout attack on right hand.    Psychiatric:         Behavior: Behavior normal.                Significant Labs: All pertinent labs within the past 24 hours have been reviewed.    Significant Imaging: I have reviewed all pertinent imaging results/findings within the past 24 hours.    Assessment/Plan:     * Acute blood loss anemia    Presented with H/H of 5.3/22 with MCV in 70s  Reports have been seeing bright red blood in stool and dark black tarry stools at times  FOBT pending  2 units of pRBC ordered and pending  GI consulted  NPO    CHF (congestive heart failure)  Patient with clinical diagnosis of heart failure on the exam with crackles in the lung and infiltrates on chest xray, distended abd, edema in LE and SOB  Takes lasix 40 mg BID PO at home, will continue with lasix 40 mg BID IV here  Continue home spironolactone 25 mg   Continue Coreg with half of the home dose  Echo pending  Strict I&O  Weight daily   Cardiac monitoring  He most likely has Right sided heart failure with pHTN and would benefit  "from a RHC at some point    Echo from October 2022    Interpretation Summary  · The left ventricle is normal in size with normal systolic function.  · The estimated ejection fraction is 65%.  · Normal left ventricular diastolic function.  · Moderate right ventricular enlargement.  · Moderate right atrial enlargement.  · Moderate mitral regurgitation.  · Mild tricuspid regurgitation.  · Elevated central venous pressure (15 mmHg).  · The estimated PA systolic pressure is 46 mmHg.  · There is pulmonary hypertension.  · Trivial pericardial effusion.  . Continue Beta Blocker, Furosemide and Aldactone and monitor clinical status closely. Monitor on telemetry. Patient is on CHF pathway.  Monitor strict Is&Os and daily weights.  Place on fluid restriction of 1.5 L. Cardiology has not been any consulted. Continue to stress to patient importance of self efficacy and  on diet for CHF. Last BNP reviewed- and noted below No results for input(s): "BNP", "BNPTRIAGEBLO" in the last 168 hours..    KAREN (acute kidney injury)  Cr of 1.4 from the baseline of >1 ten months ago.   Will continue monitoring kidney function.      Gout    Currently is having a gout attack in the right hand  Stopped allopurinol and will start colchicine 0.6 BID   Uric acid pending      Alcohol abuse  Ciwa score of 1 but patient had his last drink around 4 hours ago.   He drinks 6-12 drinks per night   Will start him on librium 25 mg Q6H  Lorazepam 1 mg once PRN for withdrawal or seizure  IV thiamine, folate and PO multivitamin   Eyes were yellow but liver enzymes were normal  Will do hepatitis panel  Will do liver US        Uncontrolled type 2 diabetes mellitus with hyperglycemia  Patient's FSGs are uncontrolled due to hyperglycemia on current medication regimen.  Last A1c reviewed- No results found for: "LABA1C", "HGBA1C"  Most recent fingerstick glucose reviewed- No results for input(s): "POCTGLUCOSE" in the last 24 hours.  Current correctional scale  " Medium  Maintain anti-hyperglycemic dose as follows-   Antihyperglycemics (From admission, onward)      Start     Stop Route Frequency Ordered    08/03/23 0400  insulin aspart U-100 injection 1-10 Units         -- SubQ Every 4 hours 08/03/23 0139          Hold Oral hypoglycemics while patient is in the hospital.    COPD (chronic obstructive pulmonary disease)    Duoneb Q6 with breathing treatment  Oxygen as needed    Essential hypertension    Currently well controlled  Continue home norvasc, half of coreg home dose, spironolactone   Changed lasix 40 mg BID PO to 40 mg IV BID    Sleep apnea    CPAP nightly       VTE Risk Mitigation (From admission, onward)           Ordered     Reason for No Pharmacological VTE Prophylaxis  Once        Question:  Reasons:  Answer:  Active Bleeding    08/03/23 0139     IP VTE HIGH RISK PATIENT  Once         08/03/23 0139     Place sequential compression device  Until discontinued         08/03/23 0139                               Armin Villegas MD  Department of Hospital Medicine  [facility] - Emergency Department

## 2023-08-03 NOTE — PLAN OF CARE
Problem: Adult Inpatient Plan of Care  Goal: Plan of Care Review  Outcome: Ongoing, Progressing  Goal: Patient-Specific Goal (Individualized)  Outcome: Ongoing, Progressing  Goal: Absence of Hospital-Acquired Illness or Injury  Outcome: Ongoing, Progressing  Goal: Optimal Comfort and Wellbeing  Outcome: Ongoing, Progressing  Goal: Readiness for Transition of Care  Outcome: Ongoing, Progressing     Problem: Bariatric Environmental Safety  Goal: Safety Maintained with Care  Outcome: Ongoing, Progressing     Problem: Diabetes Comorbidity  Goal: Blood Glucose Level Within Targeted Range  Outcome: Ongoing, Progressing     Problem: Fluid and Electrolyte Imbalance (Acute Kidney Injury/Impairment)  Goal: Fluid and Electrolyte Balance  Outcome: Ongoing, Progressing     Problem: Oral Intake Inadequate (Acute Kidney Injury/Impairment)  Goal: Optimal Nutrition Intake  Outcome: Ongoing, Progressing     Problem: Renal Function Impairment (Acute Kidney Injury/Impairment)  Goal: Effective Renal Function  Outcome: Ongoing, Progressing     Problem: Gas Exchange Impaired  Goal: Optimal Gas Exchange  Outcome: Ongoing, Progressing

## 2023-08-03 NOTE — TRANSFER OF CARE
"Anesthesia Transfer of Care Note    Patient: Abilio ARORA Carroll    Procedure(s) Performed: EGD    Patient location: GI    Anesthesia Type: MAC    Transport from OR: Transported from OR on room air with adequate spontaneous ventilation    Post pain: adequate analgesia    Post assessment: no apparent anesthetic complications    Post vital signs: stable    Level of consciousness: responds to stimulation    Nausea/Vomiting: no nausea/vomiting    Complications: none    Transfer of care protocol was followed      Last vitals:   Visit Vitals  BP (!) 106/51   Pulse 73   Temp 36.5 °C (97.7 °F)   Resp 12   Ht 6' 1" (1.854 m)   Wt (!) 151 kg (332 lb 14.3 oz)   SpO2 97%   BMI 43.92 kg/m²     "

## 2023-08-03 NOTE — SUBJECTIVE & OBJECTIVE
Interval History:     Review of Systems   Constitutional:  Positive for activity change and fatigue. Negative for chills and fever.   HENT:  Positive for congestion.    Respiratory:  Positive for cough, shortness of breath and wheezing.    Cardiovascular:  Positive for leg swelling. Negative for chest pain and palpitations.   Gastrointestinal:  Positive for abdominal distention, anal bleeding, blood in stool and constipation. Negative for abdominal pain, diarrhea, nausea, rectal pain and vomiting.   Genitourinary:  Negative for difficulty urinating.   Musculoskeletal:  Negative for arthralgias and back pain.   Skin:  Negative for color change.   Neurological:  Positive for dizziness, weakness and light-headedness. Negative for seizures, syncope, facial asymmetry, speech difficulty and numbness.   Hematological:  Negative for adenopathy.   Psychiatric/Behavioral:  Negative for agitation.      Objective:     Vital Signs (Most Recent):  Temp: 97.9 °F (36.6 °C) (08/03/23 0532)  Pulse: 74 (08/03/23 0842)  Resp: (!) 23 (08/03/23 0842)  BP: 123/77 (08/03/23 0608)  SpO2: 95 % (08/03/23 0618) Vital Signs (24h Range):  Temp:  [97.8 °F (36.6 °C)-98.7 °F (37.1 °C)] 97.9 °F (36.6 °C)  Pulse:  [70-86] 74  Resp:  [11-23] 23  SpO2:  [90 %-97 %] 95 %  BP: (104-158)/(45-77) 123/77     Weight: (!) 152 kg (335 lb 3.2 oz)  Body mass index is 44.22 kg/m².    Intake/Output Summary (Last 24 hours) at 8/3/2023 0949  Last data filed at 8/3/2023 0734  Gross per 24 hour   Intake 846.5 ml   Output 945 ml   Net -98.5 ml         Physical Exam  Vitals and nursing note reviewed.   Constitutional:       General: He is not in acute distress.     Appearance: Normal appearance. He is normal weight. He is ill-appearing. He is not toxic-appearing.   HENT:      Head: Normocephalic and atraumatic.      Right Ear: External ear normal.      Left Ear: External ear normal.      Nose: Nose normal. No congestion or rhinorrhea.      Mouth/Throat:      Mouth:  Mucous membranes are moist.   Eyes:      General: No visual field deficit.     Conjunctiva/sclera: Conjunctivae normal.   Neck:      Vascular: No carotid bruit.   Cardiovascular:      Rate and Rhythm: Normal rate and regular rhythm.      Pulses: Normal pulses.      Heart sounds: Murmur heard.      Systolic murmur is present with a grade of 2/6.   Pulmonary:      Effort: Pulmonary effort is normal. No respiratory distress.      Breath sounds: Wheezing and rales present.   Abdominal:      General: Bowel sounds are normal. There is distension.      Palpations: There is no mass.      Tenderness: There is no abdominal tenderness. There is no right CVA tenderness or guarding.      Hernia: A hernia is present.   Musculoskeletal:         General: Swelling present. No tenderness.      Cervical back: No tenderness.      Right lower leg: Edema present.      Left lower leg: Edema present.   Skin:     General: Skin is warm.      Coloration: Skin is not jaundiced.      Findings: No bruising or lesion.   Neurological:      Mental Status: He is alert and oriented to person, place, and time. Mental status is at baseline.      Cranial Nerves: Cranial nerves 2-12 are intact. No cranial nerve deficit, dysarthria or facial asymmetry.      Sensory: Sensation is intact. No sensory deficit.      Motor: No weakness, tremor, atrophy, abnormal muscle tone or seizure activity.      Comments: Neuro exam did not show any weakness. Both UE and LE have equal strenght.  Cranial nerves are all intact.   Patient has slight weakness in the right hand that he said it is due to gout. He is currently having a gout attack on right hand.    Psychiatric:         Behavior: Behavior normal.             Significant Labs: All pertinent labs within the past 24 hours have been reviewed.    Significant Imaging: I have reviewed all pertinent imaging results/findings within the past 24 hours.

## 2023-08-03 NOTE — PROGRESS NOTES
[facility] - Emergency Department  Hospital Medicine  Progress Note    Patient Name: Abilio Carroll  MRN: [id number]  Patient Class: IP- Inpatient   Admission Date: 8/2/2023  Length of Stay: 0 days  Attending Physician: Yara Wells MD  Primary Care Provider: Brandt Ruckre MD        Subjective:     Principal Problem:Acute blood loss anemia    HPI:  62 year old male, extremely a poor historian with PMHx of TERI, COPD and Alcohol abuse presented to [facility] via EMS due to weakness. Patient reports around four hours ago after having 4 beers and a marijuana joint he started feeling suddenly weak and fatigue. He could not hold any objects in his hand and would drop the food. He couldn't grab a glass of water to drink. He could not ambulate because the right leg was weak. He reports the hands and UE were both weak to the same degree. He reports the entire body was shaky and jumpy.Patient reports he has experienced episodes of this before that would only last a few seconds but now this is the first time it was constant and lasted longer. Patient reports he was feeling dizziness and felt like he is about to pass out. Room was spinning around his head. No ringing in the ear. Vision was complety ok with no deficits. Did not lose conciousness. But he did loss urine without even noticing it. Patient denies sweating, no fever, no chill, no back pain, no chest pain, no abdominal pain. He reports stool is sometimes bloody and sometimes black and has chronic constipation. He has swelling of scrotum, edema in the legs. Chronic SOB and has home oxygen he reports he wears at nights. Currently at spO2 85% on room air and 94% with 3L NC.    Patient is a poor historian, does not know a single medication he takes and has no idea about his medical history. He has HTN. Patient reports he has seen a heart doctor 5 years ago at [place] but has not seen one since. He has Diabetes and takes 40 units long acting in  the morning and SSI throughout the day. Had colonoscopy once before but doesn't remember how many years. No heart burns and never had EGD. TERI with CPAP but doesn't wear CPAP.     Current 40 pack year smoking Hx. Smokes marijuana. Drinks alcohol everyday 6-12 drinks per day.     ED course:   H/H 5.3/22  Cr 1.4  proBNP >400      Overview/Hospital Course:  8/3: patient without complaints at this time.  Plan for GI evaluation today.        Interval History:     Review of Systems   Constitutional:  Positive for activity change and fatigue. Negative for chills and fever.   HENT:  Positive for congestion.    Respiratory:  Positive for cough, shortness of breath and wheezing.    Cardiovascular:  Positive for leg swelling. Negative for chest pain and palpitations.   Gastrointestinal:  Positive for abdominal distention, anal bleeding, blood in stool and constipation. Negative for abdominal pain, diarrhea, nausea, rectal pain and vomiting.   Genitourinary:  Negative for difficulty urinating.   Musculoskeletal:  Negative for arthralgias and back pain.   Skin:  Negative for color change.   Neurological:  Positive for dizziness, weakness and light-headedness. Negative for seizures, syncope, facial asymmetry, speech difficulty and numbness.   Hematological:  Negative for adenopathy.   Psychiatric/Behavioral:  Negative for agitation.      Objective:     Vital Signs (Most Recent):  Temp: 97.9 °F (36.6 °C) (08/03/23 0532)  Pulse: 74 (08/03/23 0842)  Resp: (!) 23 (08/03/23 0842)  BP: 123/77 (08/03/23 0608)  SpO2: 95 % (08/03/23 0618) Vital Signs (24h Range):  Temp:  [97.8 °F (36.6 °C)-98.7 °F (37.1 °C)] 97.9 °F (36.6 °C)  Pulse:  [70-86] 74  Resp:  [11-23] 23  SpO2:  [90 %-97 %] 95 %  BP: (104-158)/(45-77) 123/77     Weight: (!) 152 kg (335 lb 3.2 oz)  Body mass index is 44.22 kg/m².    Intake/Output Summary (Last 24 hours) at 8/3/2023 0950  Last data filed at 8/3/2023 0734  Gross per 24 hour   Intake 846.5 ml   Output 945 ml   Net  -98.5 ml         Physical Exam  Vitals and nursing note reviewed.   Constitutional:       General: He is not in acute distress.     Appearance: Normal appearance. He is normal weight. He is ill-appearing. He is not toxic-appearing.   HENT:      Head: Normocephalic and atraumatic.      Right Ear: External ear normal.      Left Ear: External ear normal.      Nose: Nose normal. No congestion or rhinorrhea.      Mouth/Throat:      Mouth: Mucous membranes are moist.   Eyes:      General: No visual field deficit.     Conjunctiva/sclera: Conjunctivae normal.   Neck:      Vascular: No carotid bruit.   Cardiovascular:      Rate and Rhythm: Normal rate and regular rhythm.      Pulses: Normal pulses.      Heart sounds: Murmur heard.      Systolic murmur is present with a grade of 2/6.   Pulmonary:      Effort: Pulmonary effort is normal. No respiratory distress.      Breath sounds: Wheezing and rales present.   Abdominal:      General: Bowel sounds are normal. There is distension.      Palpations: There is no mass.      Tenderness: There is no abdominal tenderness. There is no right CVA tenderness or guarding.      Hernia: A hernia is present.   Musculoskeletal:         General: Swelling present. No tenderness.      Cervical back: No tenderness.      Right lower leg: Edema present.      Left lower leg: Edema present.   Skin:     General: Skin is warm.      Coloration: Skin is not jaundiced.      Findings: No bruising or lesion.   Neurological:      Mental Status: He is alert and oriented to person, place, and time. Mental status is at baseline.      Cranial Nerves: Cranial nerves 2-12 are intact. No cranial nerve deficit, dysarthria or facial asymmetry.      Sensory: Sensation is intact. No sensory deficit.      Motor: No weakness, tremor, atrophy, abnormal muscle tone or seizure activity.      Comments: Neuro exam did not show any weakness. Both UE and LE have equal strenght.  Cranial nerves are all intact.   Patient has  slight weakness in the right hand that he said it is due to gout. He is currently having a gout attack on right hand.    Psychiatric:         Behavior: Behavior normal.             Significant Labs: All pertinent labs within the past 24 hours have been reviewed.    Significant Imaging: I have reviewed all pertinent imaging results/findings within the past 24 hours.      Assessment/Plan:      * Acute blood loss anemia    Presented with H/H of 5.3/22 with MCV in 70s  Reports have been seeing bright red blood in stool and dark black tarry stools at times  FOBT pending  Protonix loading and then 40 mg BID  2 units of pRBC ordered and pending  GI consulted  NPO    8/3: GI evaluation today.     CHF (congestive heart failure)  Patient with clinical diagnosis of heart failure on the exam with crackles in the lung and infiltrates on chest xray, distended abd, edema in LE and SOB  Takes lasix 40 mg BID PO at home, will continue with lasix 40 mg BID IV here  Continue home spironolactone 25 mg   Continue Coreg with half of the home dose  Echo pending  Strict I&O  Weight daily   Cardiac monitoring  He most likely has Right sided heart failure with pHTN and would benefit from a RHC at some point    Echo from October 2022    Interpretation Summary  · The left ventricle is normal in size with normal systolic function.  · The estimated ejection fraction is 65%.  · Normal left ventricular diastolic function.  · Moderate right ventricular enlargement.  · Moderate right atrial enlargement.  · Moderate mitral regurgitation.  · Mild tricuspid regurgitation.  · Elevated central venous pressure (15 mmHg).  · The estimated PA systolic pressure is 46 mmHg.  · There is pulmonary hypertension.  · Trivial pericardial effusion.  . Continue Beta Blocker, Furosemide and Aldactone and monitor clinical status closely. Monitor on telemetry. Patient is on CHF pathway.  Monitor strict Is&Os and daily weights.  Place on fluid restriction of 1.5 L.  "Cardiology has not been any consulted. Continue to stress to patient importance of self efficacy and  on diet for CHF. Last BNP reviewed- and noted below No results for input(s): "BNP", "BNPTRIAGEBLO" in the last 168 hours..    COPD (chronic obstructive pulmonary disease)    Duoneb Q6 with breathing treatment  Oxygen as needed    Alcohol abuse  Ciwa score of 1 but patient had his last drink around 4 hours ago.   He drinks 6-12 drinks per night   Will start him on librium 25 mg Q6H  Lorazepam 1 mg once PRN for withdrawal or seizure  IV thiamine, folate and PO multivitamin   Eyes were yellow but liver enzymes were normal  Will do hepatitis panel  Will do liver US        KAREN (acute kidney injury)  Cr of 1.4 from the baseline of >1 ten months ago.   Will continue monitoring kidney function.      Sleep apnea    CPAP nightly     Gout    Currently is having a gout attack in the right hand  Stopped allopurinol and will start colchicine 0.6 BID   Uric acid pending      Uncontrolled type 2 diabetes mellitus with hyperglycemia  Patient's FSGs are uncontrolled due to hyperglycemia on current medication regimen.  Last A1c reviewed- No results found for: "LABA1C", "HGBA1C"  Most recent fingerstick glucose reviewed- No results for input(s): "POCTGLUCOSE" in the last 24 hours.  Current correctional scale  Medium  Maintain anti-hyperglycemic dose as follows-   Antihyperglycemics (From admission, onward)    Start     Stop Route Frequency Ordered    08/03/23 0400  insulin aspart U-100 injection 1-10 Units         -- SubQ Every 4 hours 08/03/23 0139        Hold Oral hypoglycemics while patient is in the hospital.    Essential hypertension    Currently well controlled  Continue home norvasc, half of coreg home dose, spironolactone   Changed lasix 40 mg BID PO to 40 mg IV BID      VTE Risk Mitigation (From admission, onward)         Ordered     Reason for No Pharmacological VTE Prophylaxis  Once        Question:  Reasons:  Answer: "  Active Bleeding    08/03/23 0139     IP VTE HIGH RISK PATIENT  Once         08/03/23 0139     Place sequential compression device  Until discontinued         08/03/23 0139                Discharge Planning   TONG:      Code Status: Full Code   Is the patient medically ready for discharge?:     Reason for patient still in hospital (select all that apply): Treatment                     Yara Wells MD  Department of Hospital Medicine   [facility] - Emergency Department

## 2023-08-03 NOTE — ASSESSMENT & PLAN NOTE
Ciwa score of 1 but patient had his last drink around 4 hours ago.   He drinks 6-12 drinks per night   Will start him on librium 25 mg Q6H  Lorazepam 1 mg once PRN for withdrawal or seizure  Eyes were yellow but liver enzymes were normal  Will do hepatitis panel  Will do liver US

## 2023-08-03 NOTE — ASSESSMENT & PLAN NOTE
Currently is having a gout attack in the right hand  Stopped allopurinol and will start colchicine 0.6 BID   Uric acid pending

## 2023-08-03 NOTE — ED NOTES
Pt transferred from a stretcher to a hospital bed. Urinal used and emptied. Bed low. Call light in reach. Wheels locked.

## 2023-08-03 NOTE — HPI
62 year old male, extremely a poor historian with PMHx of TERI, COPD and Alcohol abuse presented to the [facility] via EMS due to weakness. Patient reports around four hours ago after having 4 beers and a marijuana joint he started feeling suddenly weak and fatigue. He could not hold any objects in his hand and would drop the food. He couldn't grab a glass of water to drink. He could not ambulate because the right leg was weak. He reports the hands and UE were both weak to the same degree. He reports the entire body was shaky and jumpy.Patient reports he has experienced episodes of this before that would only last a few seconds but now this is the first time it was constant and lasted longer. Patient reports he was feeling dizziness and felt like he is about to pass out. Room was spinning around his head. No ringing in the ear. Vision was complety ok with no deficits. Did not lose conciousness. But he did loss urine without even noticing it. Patient denies sweating, no fever, no chill, no back pain, no chest pain, no abdominal pain. He reports stool is sometimes bloody and sometimes black and has chronic constipation. He has swelling of scrotum, edema in the legs. Chronic SOB and has home oxygen he reports he wears at nights. Currently at spO2 85% on room air and 94% with 3L NC.    Patient is a poor historian, does not know a single medication he takes and has no idea about his medical history. He has HTN. Patient reports he has seen a heart doctor 5 years ago at [place] but has not seen one since. He has Diabetes and takes 40 units long acting in the morning and SSI throughout the day. Had colonoscopy once before but doesn't remember how many years. No heart burns and never had EGD. TERI with CPAP but doesn't wear CPAP.     Current 40 pack year smoking Hx. Smokes marijuana. Drinks alcohol everyday 6-12 drinks per day.     ED course:   H/H 5.3/22  Cr 1.4  proBNP >400

## 2023-08-04 PROBLEM — F50.83 PICA IN ADULTS: Status: ACTIVE | Noted: 2023-08-04

## 2023-08-04 PROBLEM — J96.10 CHRONIC RESPIRATORY FAILURE: Status: ACTIVE | Noted: 2023-08-04

## 2023-08-04 PROBLEM — F50.89 PICA IN ADULTS: Status: ACTIVE | Noted: 2023-08-04

## 2023-08-04 PROBLEM — D50.9 IRON DEFICIENCY ANEMIA: Status: ACTIVE | Noted: 2023-08-04

## 2023-08-04 LAB
ALBUMIN SERPL BCP-MCNC: 3.7 G/DL (ref 3.5–5)
ALBUMIN/GLOB SERPL: 0.9 {RATIO}
ALP SERPL-CCNC: 78 U/L (ref 45–115)
ALT SERPL W P-5'-P-CCNC: 27 U/L (ref 16–61)
ANION GAP SERPL CALCULATED.3IONS-SCNC: 8 MMOL/L (ref 7–16)
ANISOCYTOSIS BLD QL SMEAR: ABNORMAL
AST SERPL W P-5'-P-CCNC: 15 U/L (ref 15–37)
BASOPHILS # BLD AUTO: 0.03 K/UL (ref 0–0.2)
BASOPHILS NFR BLD AUTO: 0.4 % (ref 0–1)
BILIRUB SERPL-MCNC: 0.7 MG/DL (ref ?–1.2)
BUN SERPL-MCNC: 15 MG/DL (ref 7–18)
BUN/CREAT SERPL: 15 (ref 6–20)
CALCIUM SERPL-MCNC: 8.6 MG/DL (ref 8.5–10.1)
CHLORIDE SERPL-SCNC: 100 MMOL/L (ref 98–107)
CO2 SERPL-SCNC: 36 MMOL/L (ref 21–32)
CREAT SERPL-MCNC: 0.98 MG/DL (ref 0.7–1.3)
DIFFERENTIAL METHOD BLD: ABNORMAL
EGFR (NO RACE VARIABLE) (RUSH/TITUS): 88 ML/MIN/1.73M2
EOSINOPHIL # BLD AUTO: 0.11 K/UL (ref 0–0.5)
EOSINOPHIL NFR BLD AUTO: 1.4 % (ref 1–4)
EOSINOPHIL NFR BLD MANUAL: 3 % (ref 1–4)
ERYTHROCYTE [DISTWIDTH] IN BLOOD BY AUTOMATED COUNT: 20 % (ref 11.5–14.5)
GLOBULIN SER-MCNC: 4.3 G/DL (ref 2–4)
GLUCOSE SERPL-MCNC: 103 MG/DL (ref 70–105)
GLUCOSE SERPL-MCNC: 103 MG/DL (ref 74–106)
GLUCOSE SERPL-MCNC: 118 MG/DL (ref 70–105)
GLUCOSE SERPL-MCNC: 128 MG/DL (ref 70–105)
GLUCOSE SERPL-MCNC: 169 MG/DL (ref 70–105)
HBA1C MFR BLD: 5.7 % (ref 4–5.6)
HCT VFR BLD AUTO: 32.4 % (ref 40–54)
HGB BLD-MCNC: 7.7 G/DL (ref 13.5–18)
HGB BLD-MCNC: 8 G/DL (ref 13.5–18)
HYPOCHROMIA BLD QL SMEAR: ABNORMAL
IMM GRANULOCYTES # BLD AUTO: 0.07 K/UL (ref 0–0.04)
IMM GRANULOCYTES NFR BLD: 0.9 % (ref 0–0.4)
IRON SATN MFR SERPL: 4 % (ref 14–50)
IRON SERPL-MCNC: 22 ΜG/DL (ref 65–175)
LYMPHOCYTES # BLD AUTO: 1.04 K/UL (ref 1–4.8)
LYMPHOCYTES NFR BLD AUTO: 12.9 % (ref 27–41)
LYMPHOCYTES NFR BLD MANUAL: 16 % (ref 27–41)
MCH RBC QN AUTO: 19.9 PG (ref 27–31)
MCHC RBC AUTO-ENTMCNC: 24.7 G/DL (ref 32–36)
MCV RBC AUTO: 80.4 FL (ref 80–96)
MONOCYTES # BLD AUTO: 1.13 K/UL (ref 0–0.8)
MONOCYTES NFR BLD AUTO: 14 % (ref 2–6)
MONOCYTES NFR BLD MANUAL: 10 % (ref 2–6)
MPC BLD CALC-MCNC: 9.8 FL (ref 9.4–12.4)
NEUTROPHILS # BLD AUTO: 5.69 K/UL (ref 1.8–7.7)
NEUTROPHILS NFR BLD AUTO: 70.4 % (ref 53–65)
NEUTS SEG NFR BLD MANUAL: 71 % (ref 50–62)
NRBC # BLD AUTO: 0.09 X10E3/UL
NRBC BLD MANUAL-RTO: 2 /100 WBC
NRBC, AUTO (.00): 1.1 %
PLATELET # BLD AUTO: 187 K/UL (ref 150–400)
PLATELET MORPHOLOGY: ABNORMAL
POTASSIUM SERPL-SCNC: 4.4 MMOL/L (ref 3.5–5.1)
PROCALCITONIN SERPL-MCNC: <0.1 NG/ML
PROT SERPL-MCNC: 8 G/DL (ref 6.4–8.2)
RBC # BLD AUTO: 4.03 M/UL (ref 4.6–6.2)
SODIUM SERPL-SCNC: 140 MMOL/L (ref 136–145)
TARGETS BLD QL SMEAR: ABNORMAL
TIBC SERPL-MCNC: 576 ΜG/DL (ref 250–450)
WBC # BLD AUTO: 8.07 K/UL (ref 4.5–11)

## 2023-08-04 PROCEDURE — 83550 IRON BINDING TEST: CPT | Performed by: NURSE PRACTITIONER

## 2023-08-04 PROCEDURE — 85018 HEMOGLOBIN: CPT | Performed by: INTERNAL MEDICINE

## 2023-08-04 PROCEDURE — 83540 ASSAY OF IRON: CPT | Performed by: NURSE PRACTITIONER

## 2023-08-04 PROCEDURE — C9113 INJ PANTOPRAZOLE SODIUM, VIA: HCPCS | Performed by: INTERNAL MEDICINE

## 2023-08-04 PROCEDURE — 99900035 HC TECH TIME PER 15 MIN (STAT)

## 2023-08-04 PROCEDURE — 25000003 PHARM REV CODE 250: Performed by: INTERNAL MEDICINE

## 2023-08-04 PROCEDURE — 25000003 PHARM REV CODE 250

## 2023-08-04 PROCEDURE — 82962 GLUCOSE BLOOD TEST: CPT

## 2023-08-04 PROCEDURE — 85025 COMPLETE CBC W/AUTO DIFF WBC: CPT

## 2023-08-04 PROCEDURE — 94640 AIRWAY INHALATION TREATMENT: CPT

## 2023-08-04 PROCEDURE — 25000242 PHARM REV CODE 250 ALT 637 W/ HCPCS

## 2023-08-04 PROCEDURE — 27000221 HC OXYGEN, UP TO 24 HOURS

## 2023-08-04 PROCEDURE — 11000001 HC ACUTE MED/SURG PRIVATE ROOM

## 2023-08-04 PROCEDURE — 63600175 PHARM REV CODE 636 W HCPCS

## 2023-08-04 PROCEDURE — C9113 INJ PANTOPRAZOLE SODIUM, VIA: HCPCS

## 2023-08-04 PROCEDURE — 63600175 PHARM REV CODE 636 W HCPCS: Performed by: INTERNAL MEDICINE

## 2023-08-04 PROCEDURE — 80053 COMPREHEN METABOLIC PANEL: CPT

## 2023-08-04 PROCEDURE — 94761 N-INVAS EAR/PLS OXIMETRY MLT: CPT

## 2023-08-04 PROCEDURE — 99233 SBSQ HOSP IP/OBS HIGH 50: CPT | Mod: ,,, | Performed by: HOSPITALIST

## 2023-08-04 PROCEDURE — 99233 PR SUBSEQUENT HOSPITAL CARE,LEVL III: ICD-10-PCS | Mod: ,,, | Performed by: HOSPITALIST

## 2023-08-04 PROCEDURE — 25000003 PHARM REV CODE 250: Performed by: HOSPITALIST

## 2023-08-04 RX ORDER — PANTOPRAZOLE SODIUM 40 MG/10ML
40 INJECTION, POWDER, LYOPHILIZED, FOR SOLUTION INTRAVENOUS DAILY
Status: DISCONTINUED | OUTPATIENT
Start: 2023-08-04 | End: 2023-08-06

## 2023-08-04 RX ORDER — ALLOPURINOL 100 MG/1
300 TABLET ORAL DAILY
Status: DISCONTINUED | OUTPATIENT
Start: 2023-08-04 | End: 2023-08-05

## 2023-08-04 RX ADMIN — CARVEDILOL 6.25 MG: 6.25 TABLET, FILM COATED ORAL at 05:08

## 2023-08-04 RX ADMIN — COLCHICINE 0.6 MG: 0.6 TABLET ORAL at 08:08

## 2023-08-04 RX ADMIN — ATORVASTATIN CALCIUM 40 MG: 40 TABLET, FILM COATED ORAL at 08:08

## 2023-08-04 RX ADMIN — CHLORDIAZEPOXIDE HYDROCHLORIDE 25 MG: 25 CAPSULE ORAL at 12:08

## 2023-08-04 RX ADMIN — CHLORDIAZEPOXIDE HYDROCHLORIDE 25 MG: 25 CAPSULE ORAL at 11:08

## 2023-08-04 RX ADMIN — THIAMINE HYDROCHLORIDE 500 MG: 100 INJECTION, SOLUTION INTRAMUSCULAR; INTRAVENOUS at 03:08

## 2023-08-04 RX ADMIN — HYDROCODONE BITARTRATE AND ACETAMINOPHEN 1 TABLET: 5; 325 TABLET ORAL at 04:08

## 2023-08-04 RX ADMIN — SPIRONOLACTONE 25 MG: 25 TABLET ORAL at 08:08

## 2023-08-04 RX ADMIN — IPRATROPIUM BROMIDE AND ALBUTEROL SULFATE 3 ML: 2.5; .5 SOLUTION RESPIRATORY (INHALATION) at 07:08

## 2023-08-04 RX ADMIN — CARVEDILOL 6.25 MG: 6.25 TABLET, FILM COATED ORAL at 08:08

## 2023-08-04 RX ADMIN — PANTOPRAZOLE SODIUM 40 MG: 40 INJECTION, POWDER, FOR SOLUTION INTRAVENOUS at 03:08

## 2023-08-04 RX ADMIN — FOLIC ACID 1 MG: 5 INJECTION, SOLUTION INTRAMUSCULAR; INTRAVENOUS; SUBCUTANEOUS at 08:08

## 2023-08-04 RX ADMIN — THIAMINE HYDROCHLORIDE 500 MG: 100 INJECTION, SOLUTION INTRAMUSCULAR; INTRAVENOUS at 08:08

## 2023-08-04 RX ADMIN — THERA TABS 1 TABLET: TAB at 08:08

## 2023-08-04 RX ADMIN — CHLORDIAZEPOXIDE HYDROCHLORIDE 25 MG: 25 CAPSULE ORAL at 06:08

## 2023-08-04 RX ADMIN — PANTOPRAZOLE SODIUM 40 MG: 40 INJECTION, POWDER, FOR SOLUTION INTRAVENOUS at 08:08

## 2023-08-04 RX ADMIN — IPRATROPIUM BROMIDE AND ALBUTEROL SULFATE 3 ML: 2.5; .5 SOLUTION RESPIRATORY (INHALATION) at 12:08

## 2023-08-04 RX ADMIN — FUROSEMIDE 40 MG: 10 INJECTION, SOLUTION INTRAVENOUS at 03:08

## 2023-08-04 RX ADMIN — ALLOPURINOL 300 MG: 300 TABLET ORAL at 05:08

## 2023-08-04 RX ADMIN — CHLORDIAZEPOXIDE HYDROCHLORIDE 25 MG: 25 CAPSULE ORAL at 05:08

## 2023-08-04 RX ADMIN — SODIUM CHLORIDE 125 MG: 9 INJECTION, SOLUTION INTRAVENOUS at 03:08

## 2023-08-04 NOTE — NURSING
Secure chatted Dr. Dodge about multiple attempts to stick pt by RN & lab for hemoglobin. No response. Lab said someone new would be in at 4:30 to stick.

## 2023-08-04 NOTE — ED PROVIDER NOTES
Encounter Date: 8/2/2023       History     Chief Complaint   Patient presents with    Hand Problem     62 year old male presents to ED for hand problem. Patient states for approximately 2-3 days, he has had increase in shaking and inability to hold things in his hands. He states on tonight, he had worsening of symptoms and called EMS for further evaluation. Patient also reports productive cough, shortness of breath, and dizziness. Patient reports he drank a 6 pk of beer on tonight and went to sleep. When he woke up symptoms were worse. Denies chest pain, nausea/vomiting, diarrhea, abdominal pain.     The history is provided by the patient and the EMS personnel.     Review of patient's allergies indicates:  No Known Allergies  Past Medical History:   Diagnosis Date    Cardiomyopathy     COPD (chronic obstructive pulmonary disease)     Diabetes mellitus, type 2     Gout     Heart failure, unspecified     Hypertension     Obstructive sleep apnea on CPAP     Osteoarthritis      History reviewed. No pertinent surgical history.  History reviewed. No pertinent family history.  Social History     Tobacco Use    Smoking status: Every Day     Current packs/day: 1.00     Average packs/day: 1 pack/day for 24.6 years (24.6 ttl pk-yrs)     Types: Cigarettes     Start date: 1999    Smokeless tobacco: Never   Substance Use Topics    Alcohol use: Never    Drug use: Never     Review of Systems   Constitutional:  Negative for chills, fatigue and fever.   HENT:  Negative for sinus pressure and sinus pain.    Eyes:  Negative for photophobia and visual disturbance.   Respiratory:  Positive for cough and shortness of breath.    Cardiovascular:  Positive for leg swelling. Negative for chest pain.   Gastrointestinal:  Negative for nausea and vomiting.   Endocrine: Negative for cold intolerance and heat intolerance.   Genitourinary:  Positive for difficulty urinating. Negative for dysuria.   Musculoskeletal:  Negative for arthralgias and  gait problem.   Skin:  Negative for color change and wound.   Neurological:  Positive for tremors and weakness. Negative for dizziness.   Hematological:  Negative for adenopathy. Does not bruise/bleed easily.   Psychiatric/Behavioral:  Negative for agitation and confusion.    All other systems reviewed and are negative.      Physical Exam     Initial Vitals   BP Pulse Resp Temp SpO2   08/02/23 2352 08/02/23 2352 08/02/23 2352 08/03/23 0154 08/02/23 2352   (!) 121/45 70 19 98.7 °F (37.1 °C) (!) 92 %      MAP       --                Physical Exam    Nursing note and vitals reviewed.  Constitutional: He appears well-developed and well-nourished.   HENT:   Head: Normocephalic and atraumatic.   Eyes: EOM are normal. Pupils are equal, round, and reactive to light.   Neck: Neck supple.   Normal range of motion.  Cardiovascular:  Normal rate and regular rhythm.           Pulmonary/Chest: He has no wheezes. He has no rhonchi.   Abdominal: He exhibits distension and mass. There is no abdominal tenderness.   Musculoskeletal:         General: Edema present. No tenderness.      Cervical back: Normal range of motion and neck supple.     Lymphadenopathy:     He has no cervical adenopathy.   Neurological: He is alert and oriented to person, place, and time.   Skin: Skin is warm and dry. Capillary refill takes less than 2 seconds.   Psychiatric: He has a normal mood and affect. Thought content normal.         Medical Screening Exam   See Full Note    ED Course   Procedures  Labs Reviewed   COMPREHENSIVE METABOLIC PANEL - Abnormal; Notable for the following components:       Result Value    Glucose 130 (*)     BUN 25 (*)     Creatinine 1.40 (*)     Calcium 7.9 (*)     Albumin 3.2 (*)     AST 13 (*)     eGFR 58 (*)     All other components within normal limits   NT-PRO NATRIURETIC PEPTIDE - Abnormal; Notable for the following components:    ProBNP 408 (*)     All other components within normal limits   URINALYSIS, REFLEX TO URINE  CULTURE - Abnormal; Notable for the following components:    Protein,  (*)     All other components within normal limits   CBC WITH DIFFERENTIAL - Abnormal; Notable for the following components:    RBC 2.91 (*)     Hemoglobin 5.3 (*)     Hematocrit 22.0 (*)     MCV 75.6 (*)     MCH 18.2 (*)     MCHC 24.1 (*)     RDW 20.2 (*)     Neutrophils % 70.1 (*)     Lymphocytes % 15.8 (*)     Monocytes % 12.4 (*)     nRBC, Auto 0.8 (*)     Monocytes, Absolute 1.06 (*)     nRBC, Absolute 0.07 (*)     All other components within normal limits   PROTIME-INR - Abnormal; Notable for the following components:    PT 16.3 (*)     All other components within normal limits   URINALYSIS, MICROSCOPIC - Abnormal; Notable for the following components:    Squamous Epithelial Cells, UA Occasional (*)     Mucous Occasional (*)     All other components within normal limits   URIC ACID - Abnormal; Notable for the following components:    Uric Acid 11.5 (*)     All other components within normal limits   LIPID PANEL - Abnormal; Notable for the following components:    HDL Cholesterol 30 (*)     All other components within normal limits   ALCOHOL,MEDICAL (ETHANOL) - Abnormal; Notable for the following components:    Ethanol 52 (*)     All other components within normal limits   HEMOGLOBIN - Abnormal; Notable for the following components:    Hemoglobin 7.3 (*)     All other components within normal limits   POCT GLUCOSE MONITORING CONTINUOUS - Abnormal; Notable for the following components:    POC Glucose 127 (*)     All other components within normal limits   SARS-COV-2 RNA AMPLIFICATION, QUAL - Normal    Narrative:     Negative SARS-CoV results should not be used as the sole basis for treatment or patient management decisions; negative results should be considered in the context of a patient's recent exposures, history and the presene of clinical signs and symptoms consistent with COVID-19.  Negative results should be treated as presumptive  and confirmed by molecular assay, if necessary for patient management.   APTT - Normal   TROPONIN I - Normal   C-REACTIVE PROTEIN - Normal   TSH - Normal   HEPATITIS PANEL, ACUTE - Normal   HIV 1 / 2 ANTIBODY - Normal   DRUG SCREEN, URINE (BEAKER) - Normal   CBC W/ AUTO DIFFERENTIAL    Narrative:     The following orders were created for panel order CBC Auto Differential.  Procedure                               Abnormality         Status                     ---------                               -----------         ------                     CBC with Differential[[id number]]        Abnormal            Final result                 Please view results for these tests on the individual orders.   CBC MORPHOLOGY   DRUG SCREEN DEFINITIVE 7, URINE   EXTRA TUBES    Narrative:     The following orders were created for panel order EXTRA TUBES.  Procedure                               Abnormality         Status                     ---------                               -----------         ------                     Red Top Hold[[id number]]                                     In process                 Light Green Top Hold[[id number]]                             In process                   Please view results for these tests on the individual orders.   RED TOP HOLD   LIGHT GREEN TOP HOLD   TYPE & SCREEN   POCT OCCULT BLOOD (STOOL)   POCT GLUCOSE MONITORING CONTINUOUS   PREPARE RBC SOFT        ECG Results              EKG 12-lead (Final result)  Result time 08/04/23 04:53:59      Final result by Interface, Lab In [facility] (08/04/23 04:53:59)                   Narrative:    Test Reason : R06.02,    Vent. Rate : 070 BPM     Atrial Rate : 000 BPM     P-R Int : 214 ms          QRS Dur : 082 ms      QT Int : 410 ms       P-R-T Axes : 057 042 043 degrees     QTc Int : 428 ms    Sinus arrhythmia with borderline 1st degree A-V block  Low QRS voltages in precordial leads  Borderline ECG    Confirmed by Jefferson HENNESSY, Jose PATRICK ([id number]) on  8/4/2023 4:53:54 AM    Referred By: [email or web address]   SELF           Confirmed By:Jose Paulino MD                                  Imaging Results              US Abdomen Limited (Final result)  Result time 08/03/23 07:38:34      Final result by Vic Jacome MD (08/03/23 07:38:34)                   Impression:      Prominent gallbladder contraction    No focal hepatic abnormality    Pancreas obscured by bowel gas      Electronically signed by: Vic Jacome  Date:    08/03/2023  Time:    07:38               Narrative:    EXAMINATION:  Abdominal ultrasound limited to the right upper quadrant    CLINICAL HISTORY:  Liver. looking for cirrhosis.;    TECHNIQUE:  Targeted ultrasound of the liver and right upper quadrant was performed.  Real-time ultrasound images are captured and archived.    COMPARISON:  None    FINDINGS:  Liver: Normal in size at 17.3 cm length.  Homogeneous parenchymal echotexture. No focal lesions.  There is hepatopedal flow in the portal vein.    Gallbladder: Gallbladder is prominently contracted.  No gross gallstones.  No sonographic Aguiar sign.    Biliary system: Common duct normal in caliber at 5 mm. No intrahepatic ductal dilatation.    Pancreas: Obscured by bowel gas    Right kidney: Length is 12.2 cm. No stones, solid mass, or hydronephrosis.    Aorta: No aneurysm.    Inferior vena cava: Normal in appearance.    Miscellaneous: No ascites.                                       CT Head Without Contrast (Final result)  Result time 08/03/23 07:43:43      Final result by Vic Jacome MD (08/03/23 07:43:43)                   Impression:      No acute intracranial process    Periventricular small vessel disease      Electronically signed by: Vic Jacome  Date:    08/03/2023  Time:    07:43               Narrative:    EXAMINATION:  CT head without contrast    CLINICAL HISTORY:  Neuro deficit, acute, stroke suspected;    TECHNIQUE:  Transaxial CT sections were obtained through  the brain without contrast.    The exam was also reviewed by [facility].    The CT examination was performed using one or more of the following dose reduction techniques: Automated exposure control, adjustment of the mA and kV according to patient's size, use of acute or iterative reconstruction techniques.    COMPARISON:  No previous head CT available    FINDINGS:  The ventricles are midline in position without evidence of hydrocephalus. There is no mass or area of parenchymal hemorrhage. There is no gross CT evidence of acute cortical stroke.  There is a small amount of patchy decreased density in the periventricular white matter without mass effect compatible with changes of small vessel disease.  There is no extra-axial hematoma. The partially visualized sinuses are generally clear. There is no obvious skull fracture.                                       X-Ray Chest 1 View (Final result)  Result time 08/03/23 07:48:30      Final result by Gerard Kc II, MD (08/03/23 07:48:30)                   Impression:      Findings suggest cardiac decompensation and / or pneumonitis.      Electronically signed by: Gerard Kc  Date:    08/03/2023  Time:    07:48               Narrative:    EXAMINATION:  XR CHEST 1 VIEW    CLINICAL HISTORY:  shortness of breath;    COMPARISON:  3 October 2022    TECHNIQUE:  XR CHEST 1 VIEW    FINDINGS:  The heart and mediastinum are stable in size and configuration.  The pulmonary vascularity is slightly increased with bilateral increased interstitial lung density.  No other lung infiltrates, effusions, pneumothorax or other abnormality is demonstrated.                                       Medications   0.9%  NaCl infusion (for blood administration) ( Intravenous Stopped 8/3/23 0734)   atorvastatin tablet 40 mg (40 mg Oral Given 8/4/23 0812)   spironolactone tablet 25 mg (25 mg Oral Given 8/4/23 0812)   sodium chloride 0.9% flush 10 mL (has no administration in time range)    naloxone 0.4 mg/mL injection 0.02 mg (has no administration in time range)   glucose chewable tablet 16 g (has no administration in time range)   glucose chewable tablet 24 g (has no administration in time range)   glucagon (human recombinant) injection 1 mg (has no administration in time range)   acetaminophen tablet 1,000 mg (has no administration in time range)   HYDROcodone-acetaminophen 5-325 mg per tablet 1 tablet (1 tablet Oral Given 8/4/23 0400)   albuterol-ipratropium 2.5 mg-0.5 mg/3 mL nebulizer solution 3 mL (3 mLs Nebulization Given 8/4/23 1206)   dextrose 10% bolus 125 mL 125 mL (has no administration in time range)   dextrose 10% bolus 250 mL 250 mL (has no administration in time range)   furosemide injection 40 mg (40 mg Intravenous Given 8/4/23 1517)   colchicine capsule/tablet 0.6 mg (0.6 mg Oral Given 8/4/23 0812)   carvediloL tablet 6.25 mg (6.25 mg Oral Given 8/4/23 0812)   chlordiazepoxide capsule 25 mg (25 mg Oral Given 8/4/23 1158)   LORazepam injection 1 mg (has no administration in time range)   folic acid 1 mg in dextrose 5 % (D5W) 100 mL IVPB (0 mg Intravenous Stopped 8/4/23 0911)   multivitamin tablet (1 tablet Oral Given 8/4/23 0812)   thiamine (B-1) 500 mg in dextrose 5 % (D5W) 100 mL IVPB (500 mg Intravenous New Bag 8/4/23 1517)   insulin aspart U-100 injection 1-10 Units (has no administration in time range)   ferric gluconate (FERRLECIT) 125 mg in sodium chloride 0.9% 100 mL IVPB (125 mg Intravenous New Bag 8/4/23 1517)   pantoprazole injection 40 mg (40 mg Intravenous Given 8/4/23 1517)   pantoprazole injection 80 mg (80 mg Intravenous Given 8/3/23 0436)     Medical Decision Making:   Initial Assessment:   Hand problem  Differential Diagnosis:   TIA/CVA  Alcohol intoxication  Clinical Tests:   Lab Tests: Ordered and Reviewed  Radiological Study: Ordered and Reviewed  Medical Tests: Reviewed and Ordered  ED Management:  MDM    Patient presents for emergent evaluation of acute hand  problem that poses a threat to life and/or bodily function.    In the ED patient found to have acute shortness of breath, anemia, .    I ordered labs and personally reviewed them.  Labs significant for glucose 130, BUN/creatinine 1.4/25, , H&H 5.3/22.    I ordered X-rays and personally reviewed them and reviewed the radiologist interpretation.  Xray significant for findings suggest cardiac decompensation and/or pneumonitis.    I ordered EKG and personally reviewed it.  EKG significant for sinus rhythm with borderline first-degree AV block.    I ordered CT scan and personally reviewed it and reviewed the radiologist interpretation.  CT significant for no acute processes.      Admission MDM  I discussed the patient presentation and findings with Dr. Cook; assumed care after MN   Patient required emergent consultation to Dr. Dodge (admitting physician) for admission.                           Clinical Impression:   Final diagnoses:  [R06.02] Shortness of breath        ED Disposition Condition    Admit                 Migdalia Cedeno, [facility]  08/04/23 3255

## 2023-08-04 NOTE — PLAN OF CARE
Problem: Adult Inpatient Plan of Care  Goal: Plan of Care Review  Outcome: Ongoing, Progressing  Goal: Patient-Specific Goal (Individualized)  Outcome: Ongoing, Progressing  Goal: Absence of Hospital-Acquired Illness or Injury  Outcome: Ongoing, Progressing  Goal: Optimal Comfort and Wellbeing  Outcome: Ongoing, Progressing  Goal: Readiness for Transition of Care  Outcome: Ongoing, Progressing     Problem: Bariatric Environmental Safety  Goal: Safety Maintained with Care  Outcome: Ongoing, Progressing     Problem: Diabetes Comorbidity  Goal: Blood Glucose Level Within Targeted Range  Outcome: Ongoing, Progressing     Problem: Fluid and Electrolyte Imbalance (Acute Kidney Injury/Impairment)  Goal: Fluid and Electrolyte Balance  Outcome: Ongoing, Progressing     Problem: Oral Intake Inadequate (Acute Kidney Injury/Impairment)  Goal: Optimal Nutrition Intake  Outcome: Ongoing, Progressing     Problem: Renal Function Impairment (Acute Kidney Injury/Impairment)  Goal: Effective Renal Function  Outcome: Ongoing, Progressing     Problem: Gas Exchange Impaired  Goal: Optimal Gas Exchange  Outcome: Ongoing, Progressing     Problem: Fall Injury Risk  Goal: Absence of Fall and Fall-Related Injury  Outcome: Ongoing, Progressing     Problem: Impaired Wound Healing  Goal: Optimal Wound Healing  Outcome: Ongoing, Progressing

## 2023-08-04 NOTE — SUBJECTIVE & OBJECTIVE
Interval History: 8/4: EGD completed 8/3. PPI BID recommended. Iron deficiency anemia to be treated with ferrlecit. Weakness has improved.     Review of Systems   Constitutional:  Positive for fatigue. Negative for chills and fever.   Respiratory:  Negative for shortness of breath and wheezing.    Cardiovascular:  Positive for leg swelling. Negative for chest pain and palpitations.   Gastrointestinal:  Positive for blood in stool and constipation. Negative for abdominal pain, diarrhea, nausea, rectal pain and vomiting.   Genitourinary:  Negative for difficulty urinating.   Musculoskeletal:  Negative for arthralgias and back pain.   Skin:  Negative for color change.   Neurological:  Positive for dizziness and weakness. Negative for seizures, syncope, facial asymmetry, speech difficulty and numbness.   Hematological:  Negative for adenopathy.   Psychiatric/Behavioral:  Negative for agitation.      Objective:     Vital Signs (Most Recent):  Temp: 98 °F (36.7 °C) (08/04/23 1010)  Pulse: 79 (08/04/23 1206)  Resp: 20 (08/04/23 1206)  BP: 134/67 (08/04/23 1010)  SpO2: (!) 92 % (08/04/23 1206) Vital Signs (24h Range):  Temp:  [97.3 °F (36.3 °C)-98.7 °F (37.1 °C)] 98 °F (36.7 °C)  Pulse:  [74-87] 79  Resp:  [18-25] 20  SpO2:  [92 %-97 %] 92 %  BP: (103-162)/(47-79) 134/67     Weight: (!) 151 kg (332 lb 14.3 oz)  Body mass index is 43.92 kg/m².    Intake/Output Summary (Last 24 hours) at 8/4/2023 1411  Last data filed at 8/4/2023 0924  Gross per 24 hour   Intake 100 ml   Output 3525 ml   Net -3425 ml         Physical Exam  Vitals and nursing note reviewed.   Constitutional:       General: He is not in acute distress.     Appearance: Normal appearance. He is normal weight. He is not toxic-appearing.   HENT:      Head: Normocephalic and atraumatic.      Right Ear: External ear normal.      Left Ear: External ear normal.      Nose: Nose normal. No congestion or rhinorrhea.      Mouth/Throat:      Mouth: Mucous membranes are  moist.   Eyes:      General: No visual field deficit.     Conjunctiva/sclera: Conjunctivae normal.   Neck:      Vascular: No carotid bruit.   Cardiovascular:      Rate and Rhythm: Normal rate and regular rhythm.      Pulses: Normal pulses.      Heart sounds: Murmur heard.      Systolic murmur is present with a grade of 2/6.   Pulmonary:      Effort: Pulmonary effort is normal. No respiratory distress.   Abdominal:      General: Bowel sounds are normal. There is distension.      Palpations: There is no mass.      Tenderness: There is no abdominal tenderness. There is no right CVA tenderness or guarding.      Hernia: A hernia is present.   Musculoskeletal:         General: Swelling present. No tenderness.      Cervical back: No tenderness.      Right lower leg: Edema present.      Left lower leg: Edema present.   Skin:     General: Skin is warm.      Coloration: Skin is not jaundiced.      Findings: No bruising or lesion.   Neurological:      Mental Status: He is alert and oriented to person, place, and time. Mental status is at baseline.      Cranial Nerves: Cranial nerves 2-12 are intact. No cranial nerve deficit, dysarthria or facial asymmetry.      Sensory: Sensation is intact. No sensory deficit.      Motor: No weakness, tremor, atrophy, abnormal muscle tone or seizure activity.      Comments: Neuro exam did not show any weakness. Both UE and LE have equal strenght.  Cranial nerves are all intact.   Patient has slight weakness in the right hand that he said it is due to gout. He is currently having a gout attack on right hand.    Psychiatric:         Behavior: Behavior normal.             Significant Labs: All pertinent labs within the past 24 hours have been reviewed.  BMP:   Recent Labs   Lab 08/04/23  0709         K 4.4      CO2 36*   BUN 15   CREATININE 0.98   CALCIUM 8.6     CBC:   Recent Labs   Lab 08/02/23  2351 08/03/23  0748 08/03/23  1546 08/04/23  0026 08/04/23  0709   WBC 8.56  --    --   --  8.07   HGB 5.3*   < > 7.5* 7.7* 8.0*   HCT 22.0*  --   --   --  32.4*     --   --   --  187    < > = values in this interval not displayed.       Significant Imaging: I have reviewed all pertinent imaging results/findings within the past 24 hours.

## 2023-08-04 NOTE — PROGRESS NOTES
[facility] - [facility] Medicine  Progress Note    Patient Name: Abilio Carroll  MRN: [id number]  Patient Class: IP- Inpatient   Admission Date: 8/2/2023  Length of Stay: 1 days  Attending Physician: Carl Brennan MD  Primary Care Provider: Brandt Rucker MD        Subjective:     Principal Problem:Acute blood loss anemia        HPI:  62 year old male, extremely a poor historian with PMHx of TERI, COPD and Alcohol abuse presented to [facility] via EMS due to weakness. Patient reports around four hours ago after having 4 beers and a marijuana joint he started feeling suddenly weak and fatigue. He could not hold any objects in his hand and would drop the food. He couldn't grab a glass of water to drink. He could not ambulate because the right leg was weak. He reports the hands and UE were both weak to the same degree. He reports the entire body was shaky and jumpy.Patient reports he has experienced episodes of this before that would only last a few seconds but now this is the first time it was constant and lasted longer. Patient reports he was feeling dizziness and felt like he is about to pass out. Room was spinning around his head. No ringing in the ear. Vision was complety ok with no deficits. Did not lose conciousness. But he did loss urine without even noticing it. Patient denies sweating, no fever, no chill, no back pain, no chest pain, no abdominal pain. He reports stool is sometimes bloody and sometimes black and has chronic constipation. He has swelling of scrotum, edema in the legs. Chronic SOB and has home oxygen he reports he wears at nights. Currently at spO2 85% on room air and 94% with 3L NC.    Patient is a poor historian, does not know a single medication he takes and has no idea about his medical history. He has HTN. Patient reports he has seen a heart doctor 5 years ago at [place] but has not seen one since. He has Diabetes and takes 40 units long acting  in the morning and SSI throughout the day. Had colonoscopy once before but doesn't remember how many years. No heart burns and never had EGD. TERI with CPAP but doesn't wear CPAP.     Current 40 pack year smoking Hx. Smokes marijuana. Drinks alcohol everyday 6-12 drinks per day.     ED course:   H/H 5.3/22  Cr 1.4  proBNP >400      Overview/Hospital Course:  8/3: patient without complaints at this time.  Plan for GI evaluation today.        Interval History: 8/4: EGD completed 8/3. PPI BID recommended. Iron deficiency anemia to be treated with ferrlecit. Weakness has improved.     Review of Systems   Constitutional:  Positive for fatigue. Negative for chills and fever.   Respiratory:  Negative for shortness of breath and wheezing.    Cardiovascular:  Positive for leg swelling. Negative for chest pain and palpitations.   Gastrointestinal:  Positive for blood in stool and constipation. Negative for abdominal pain, diarrhea, nausea, rectal pain and vomiting.   Genitourinary:  Negative for difficulty urinating.   Musculoskeletal:  Negative for arthralgias and back pain.   Skin:  Negative for color change.   Neurological:  Positive for dizziness and weakness. Negative for seizures, syncope, facial asymmetry, speech difficulty and numbness.   Hematological:  Negative for adenopathy.   Psychiatric/Behavioral:  Negative for agitation.      Objective:     Vital Signs (Most Recent):  Temp: 98 °F (36.7 °C) (08/04/23 1010)  Pulse: 79 (08/04/23 1206)  Resp: 20 (08/04/23 1206)  BP: 134/67 (08/04/23 1010)  SpO2: (!) 92 % (08/04/23 1206) Vital Signs (24h Range):  Temp:  [97.3 °F (36.3 °C)-98.7 °F (37.1 °C)] 98 °F (36.7 °C)  Pulse:  [74-87] 79  Resp:  [18-25] 20  SpO2:  [92 %-97 %] 92 %  BP: (103-162)/(47-79) 134/67     Weight: (!) 151 kg (332 lb 14.3 oz)  Body mass index is 43.92 kg/m².    Intake/Output Summary (Last 24 hours) at 8/4/2023 1411  Last data filed at 8/4/2023 0924  Gross per 24 hour   Intake 100 ml   Output 3525 ml    Net -3425 ml         Physical Exam  Vitals and nursing note reviewed.   Constitutional:       General: He is not in acute distress.     Appearance: Normal appearance. He is normal weight. He is not toxic-appearing.   HENT:      Head: Normocephalic and atraumatic.      Right Ear: External ear normal.      Left Ear: External ear normal.      Nose: Nose normal. No congestion or rhinorrhea.      Mouth/Throat:      Mouth: Mucous membranes are moist.   Eyes:      General: No visual field deficit.     Conjunctiva/sclera: Conjunctivae normal.   Neck:      Vascular: No carotid bruit.   Cardiovascular:      Rate and Rhythm: Normal rate and regular rhythm.      Pulses: Normal pulses.      Heart sounds: Murmur heard.      Systolic murmur is present with a grade of 2/6.   Pulmonary:      Effort: Pulmonary effort is normal. No respiratory distress.   Abdominal:      General: Bowel sounds are normal. There is distension.      Palpations: There is no mass.      Tenderness: There is no abdominal tenderness. There is no right CVA tenderness or guarding.      Hernia: A hernia is present.   Musculoskeletal:         General: Swelling present. No tenderness.      Cervical back: No tenderness.      Right lower leg: Edema present.      Left lower leg: Edema present.   Skin:     General: Skin is warm.      Coloration: Skin is not jaundiced.      Findings: No bruising or lesion.   Neurological:      Mental Status: He is alert and oriented to person, place, and time. Mental status is at baseline.      Cranial Nerves: Cranial nerves 2-12 are intact. No cranial nerve deficit, dysarthria or facial asymmetry.      Sensory: Sensation is intact. No sensory deficit.      Motor: No weakness, tremor, atrophy, abnormal muscle tone or seizure activity.      Comments: Neuro exam did not show any weakness. Both UE and LE have equal strenght.  Cranial nerves are all intact.   Patient has slight weakness in the right hand that he said it is due to gout.  He is currently having a gout attack on right hand.    Psychiatric:         Behavior: Behavior normal.             Significant Labs: All pertinent labs within the past 24 hours have been reviewed.  BMP:   Recent Labs   Lab 08/04/23  0709         K 4.4      CO2 36*   BUN 15   CREATININE 0.98   CALCIUM 8.6     CBC:   Recent Labs   Lab 08/02/23  2351 08/03/23  0748 08/03/23  1546 08/04/23  0026 08/04/23  0709   WBC 8.56  --   --   --  8.07   HGB 5.3*   < > 7.5* 7.7* 8.0*   HCT 22.0*  --   --   --  32.4*     --   --   --  187    < > = values in this interval not displayed.       Significant Imaging: I have reviewed all pertinent imaging results/findings within the past 24 hours.      Assessment/Plan:      * Acute blood loss anemia    Presented with H/H of 5.3/22 with MCV in 70s  Reports have been seeing bright red blood in stool and dark black tarry stools at times  FOBT pending  Protonix loading and then 40 mg BID  2 units of pRBC ordered and pending  GI consulted  NPO    8/3: GI evaluation today.   8/4: GI recommendations are to start Protonix 40 mg,   Needs screening colonscopy as outpatient.  Monitor CBC (hgb 8.0)  Fe studies reveal Fe 22, TIBC 476  Fe Sat 4%  Started Ferrlecit 125 mg for 5 days              Alcohol abuse  Ciwa score of 1 but patient had his last drink around 4 hours ago.   He drinks 6-12 drinks per night   Will start him on librium 25 mg Q6H  Lorazepam 1 mg once PRN for withdrawal or seizure  IV thiamine, folate and PO multivitamin   Eyes were yellow but liver enzymes were normal  Will do hepatitis panel  Will do liver US    8/4: Continue current management      KAREN (acute kidney injury)  Cr of 1.4 from the baseline of >1 ten months ago.   Will continue monitoring kidney function.      Sleep apnea    CPAP nightly     Gout    Currently is having a gout attack in the right hand  Stopped allopurinol and will start colchicine 0.6 BID   Uric acid pending      COPD (chronic  "obstructive pulmonary disease)    Duoneb Q6 with breathing treatment  Oxygen as needed    CHF (congestive heart failure)  Patient with clinical diagnosis of heart failure on the exam with crackles in the lung and infiltrates on chest xray, distended abd, edema in LE and SOB  Takes lasix 40 mg BID PO at home, will continue with lasix 40 mg BID IV here  Continue home spironolactone 25 mg   Continue Coreg with half of the home dose  Echo pending  Strict I&O  Weight daily   Cardiac monitoring  He most likely has Right sided heart failure with pHTN and would benefit from a RHC at some point    Echo from October 2022    Interpretation Summary  · The left ventricle is normal in size with normal systolic function.  · The estimated ejection fraction is 65%.  · Normal left ventricular diastolic function.  · Moderate right ventricular enlargement.  · Moderate right atrial enlargement.  · Moderate mitral regurgitation.  · Mild tricuspid regurgitation.  · Elevated central venous pressure (15 mmHg).  · The estimated PA systolic pressure is 46 mmHg.  · There is pulmonary hypertension.  · Trivial pericardial effusion.  . Continue Beta Blocker, Furosemide and Aldactone and monitor clinical status closely. Monitor on telemetry. Patient is on CHF pathway.  Monitor strict Is&Os and daily weights.  Place on fluid restriction of 1.5 L. Cardiology has not been any consulted. Continue to stress to patient importance of self efficacy and  on diet for CHF. Last BNP reviewed- and noted below No results for input(s): "BNP", "BNPTRIAGEBLO" in the last 168 hours..    Uncontrolled type 2 diabetes mellitus with hyperglycemia  Patient's FSGs are uncontrolled due to hyperglycemia on current medication regimen.  Last A1c reviewed- No results found for: "LABA1C", "HGBA1C"  Most recent fingerstick glucose reviewed- No results for input(s): "POCTGLUCOSE" in the last 24 hours.  Current correctional scale  Medium  Maintain anti-hyperglycemic dose " as follows-   Antihyperglycemics (From admission, onward)    Start     Stop Route Frequency Ordered    08/03/23 0400  insulin aspart U-100 injection 1-10 Units         -- SubQ Every 4 hours 08/03/23 0139        Hold Oral hypoglycemics while patient is in the hospital.    Essential hypertension    Currently well controlled  Continue home norvasc, half of coreg home dose, spironolactone   Changed lasix 40 mg BID PO to 40 mg IV BID      VTE Risk Mitigation (From admission, onward)         Ordered     Reason for No Pharmacological VTE Prophylaxis  Once        Question:  Reasons:  Answer:  Active Bleeding    08/03/23 0139     IP VTE HIGH RISK PATIENT  Once         08/03/23 0139     Place sequential compression device  Until discontinued         08/03/23 0139                Discharge Planning   TONG:      Code Status: Full Code   Is the patient medically ready for discharge?:     Reason for patient still in hospital (select all that apply): Treatment  Discharge Plan A: Home, Home with family                  Carol Munoz MD  Department of Hospital Medicine   [facility]

## 2023-08-04 NOTE — ASSESSMENT & PLAN NOTE
Ciwa score of 1 but patient had his last drink around 4 hours ago.   He drinks 6-12 drinks per night   Will start him on librium 25 mg Q6H  Lorazepam 1 mg once PRN for withdrawal or seizure  IV thiamine, folate and PO multivitamin   Eyes were yellow but liver enzymes were normal  Will do hepatitis panel  Will do liver US    8/4: Continue current management

## 2023-08-04 NOTE — ASSESSMENT & PLAN NOTE
Presented with H/H of 5.3/22 with MCV in 70s  Reports have been seeing bright red blood in stool and dark black tarry stools at times  FOBT pending  Protonix loading and then 40 mg BID  2 units of pRBC ordered and pending  GI consulted  NPO    8/3: GI evaluation today.   8/4: GI recommendations are to start Protonix 40 mg,   Needs screening colonscopy as outpatient.  Monitor CBC (hgb 8.0)  Fe studies reveal Fe 22, TIBC 476  Fe Sat 4%  Started Ferrlecit 125 mg for 5 days

## 2023-08-04 NOTE — PLAN OF CARE
Problem: Adult Inpatient Plan of Care  Goal: Plan of Care Review  Outcome: Ongoing, Progressing  Goal: Patient-Specific Goal (Individualized)  Outcome: Ongoing, Progressing  Goal: Absence of Hospital-Acquired Illness or Injury  Outcome: Ongoing, Progressing  Goal: Optimal Comfort and Wellbeing  Outcome: Ongoing, Progressing  Intervention: Monitor Pain and Promote Comfort  Flowsheets (Taken 8/4/2023 1313)  Pain Management Interventions:   relaxation techniques promoted   quiet environment facilitated   pillow support provided   position adjusted   care clustered  Intervention: Provide Person-Centered Care  Flowsheets (Taken 8/4/2023 1313)  Trust Relationship/Rapport:   reassurance provided   care explained   choices provided   thoughts/feelings acknowledged   emotional support provided   questions answered   empathic listening provided   questions encouraged  Goal: Readiness for Transition of Care  Outcome: Ongoing, Progressing     Problem: Bariatric Environmental Safety  Goal: Safety Maintained with Care  Outcome: Ongoing, Progressing     Problem: Diabetes Comorbidity  Goal: Blood Glucose Level Within Targeted Range  Outcome: Ongoing, Progressing  Intervention: Monitor and Manage Glycemia  Flowsheets (Taken 8/4/2023 1313)  Glycemic Management: blood glucose monitored     Problem: Fluid and Electrolyte Imbalance (Acute Kidney Injury/Impairment)  Goal: Fluid and Electrolyte Balance  Outcome: Ongoing, Progressing     Problem: Oral Intake Inadequate (Acute Kidney Injury/Impairment)  Goal: Optimal Nutrition Intake  Outcome: Ongoing, Progressing     Problem: Renal Function Impairment (Acute Kidney Injury/Impairment)  Goal: Effective Renal Function  Outcome: Ongoing, Progressing     Problem: Gas Exchange Impaired  Goal: Optimal Gas Exchange  Outcome: Ongoing, Progressing     Problem: Fall Injury Risk  Goal: Absence of Fall and Fall-Related Injury  Outcome: Ongoing, Progressing     Problem: Impaired Wound Healing  Goal:  Optimal Wound Healing  Outcome: Ongoing, Progressing

## 2023-08-05 LAB
ANION GAP SERPL CALCULATED.3IONS-SCNC: 6 MMOL/L (ref 7–16)
AORTIC ROOT ANNULUS: 2.63 CM
AORTIC VALVE CUSP SEPERATION: 2.08 CM
AV INDEX (PROSTH): 1.12
AV MEAN GRADIENT: 2 MMHG
AV PEAK GRADIENT: 4 MMHG
AV VALVE AREA BY VELOCITY RATIO: 3.49 CM²
AV VALVE AREA: 3.76 CM²
AV VELOCITY RATIO: 1.04
BASOPHILS # BLD AUTO: 0.02 K/UL (ref 0–0.2)
BASOPHILS NFR BLD AUTO: 0.2 % (ref 0–1)
BSA FOR ECHO PROCEDURE: 2.8 M2
BUN SERPL-MCNC: 12 MG/DL (ref 7–18)
BUN/CREAT SERPL: 13 (ref 6–20)
CALCIUM SERPL-MCNC: 8.9 MG/DL (ref 8.5–10.1)
CHLORIDE SERPL-SCNC: 98 MMOL/L (ref 98–107)
CO2 SERPL-SCNC: 39 MMOL/L (ref 21–32)
CREAT SERPL-MCNC: 0.92 MG/DL (ref 0.7–1.3)
CV ECHO LV RWT: 0.73 CM
DIFFERENTIAL METHOD BLD: ABNORMAL
DOP CALC AO PEAK VEL: 1.03 M/S
DOP CALC AO VTI: 21.3 CM
DOP CALC LVOT AREA: 3.4 CM2
DOP CALC LVOT DIAMETER: 2.07 CM
DOP CALC LVOT PEAK VEL: 1.07 M/S
DOP CALC LVOT STROKE VOLUME: 80.05 CM3
DOP CALC MV VTI: 43.3 CM
DOP CALCLVOT PEAK VEL VTI: 23.8 CM
E WAVE DECELERATION TIME: 220.23 MSEC
E/A RATIO: 1.17
E/E' RATIO: 15.25 M/S
ECHO LV POSTERIOR WALL: 1.75 CM (ref 0.6–1.1)
EGFR (NO RACE VARIABLE) (RUSH/TITUS): 95 ML/MIN/1.73M2
EOSINOPHIL # BLD AUTO: 0.12 K/UL (ref 0–0.5)
EOSINOPHIL NFR BLD AUTO: 1.3 % (ref 1–4)
ERYTHROCYTE [DISTWIDTH] IN BLOOD BY AUTOMATED COUNT: 20.5 % (ref 11.5–14.5)
FRACTIONAL SHORTENING: 29 % (ref 28–44)
GLUCOSE SERPL-MCNC: 111 MG/DL (ref 70–105)
GLUCOSE SERPL-MCNC: 112 MG/DL (ref 74–106)
GLUCOSE SERPL-MCNC: 122 MG/DL (ref 70–105)
GLUCOSE SERPL-MCNC: 150 MG/DL (ref 70–105)
GLUCOSE SERPL-MCNC: 161 MG/DL (ref 70–105)
GLUCOSE SERPL-MCNC: 207 MG/DL (ref 70–105)
HCT VFR BLD AUTO: 31.7 % (ref 40–54)
HGB BLD-MCNC: 7.7 G/DL (ref 13.5–18)
HYPOCHROMIA BLD QL SMEAR: NORMAL
IMM GRANULOCYTES # BLD AUTO: 0.09 K/UL (ref 0–0.04)
IMM GRANULOCYTES NFR BLD: 1 % (ref 0–0.4)
INTERVENTRICULAR SEPTUM: 1.67 CM (ref 0.6–1.1)
IVC DIAMETER: 2.84 CM
LEFT ATRIUM SIZE: 4.14 CM
LEFT INTERNAL DIMENSION IN SYSTOLE: 3.42 CM (ref 2.1–4)
LEFT VENTRICLE DIASTOLIC VOLUME INDEX: 40.01 ML/M2
LEFT VENTRICLE DIASTOLIC VOLUME: 107.24 ML
LEFT VENTRICLE MASS INDEX: 138 G/M2
LEFT VENTRICLE SYSTOLIC VOLUME INDEX: 17.9 ML/M2
LEFT VENTRICLE SYSTOLIC VOLUME: 48.01 ML
LEFT VENTRICULAR INTERNAL DIMENSION IN DIASTOLE: 4.79 CM (ref 3.5–6)
LEFT VENTRICULAR MASS: 369.58 G
LV LATERAL E/E' RATIO: 15.25 M/S
LV SEPTAL E/E' RATIO: 15.25 M/S
LVOT MG: 2.48 MMHG
LVOT MV: 0.74 CM/S
LYMPHOCYTES # BLD AUTO: 0.95 K/UL (ref 1–4.8)
LYMPHOCYTES NFR BLD AUTO: 10.6 % (ref 27–41)
MCH RBC QN AUTO: 20.1 PG (ref 27–31)
MCHC RBC AUTO-ENTMCNC: 24.3 G/DL (ref 32–36)
MCV RBC AUTO: 82.6 FL (ref 80–96)
MONOCYTES # BLD AUTO: 1.51 K/UL (ref 0–0.8)
MONOCYTES NFR BLD AUTO: 16.9 % (ref 2–6)
MPC BLD CALC-MCNC: 10.2 FL (ref 9.4–12.4)
MV MEAN GRADIENT: 3 MMHG
MV PEAK A VEL: 1.04 M/S
MV PEAK E VEL: 1.22 M/S
MV PEAK GRADIENT: 9 MMHG
MV STENOSIS PRESSURE HALF TIME: 52.6 MS
MV VALVE AREA BY CONTINUITY EQUATION: 1.85 CM2
MV VALVE AREA P 1/2 METHOD: 4.18 CM2
NEUTROPHILS # BLD AUTO: 6.27 K/UL (ref 1.8–7.7)
NEUTROPHILS NFR BLD AUTO: 70 % (ref 53–65)
NRBC # BLD AUTO: 0.05 X10E3/UL
NRBC, AUTO (.00): 0.6 %
PISA MRMAX VEL: 4.26 M/S
PISA TR MAX VEL: 2.79 M/S
PLATELET # BLD AUTO: 83 K/UL (ref 150–400)
POLYCHROMASIA BLD QL SMEAR: NORMAL
POTASSIUM SERPL-SCNC: 4.9 MMOL/L (ref 3.5–5.1)
PV PEAK GRADIENT: 7 MMHG
PV PEAK VELOCITY: 1.31 M/S
RA MAJOR: 4 CM
RA PRESSURE ESTIMATED: 3 MMHG
RBC # BLD AUTO: 3.84 M/UL (ref 4.6–6.2)
RIGHT VENTRICULAR END-DIASTOLIC DIMENSION: 4.63 CM
RV TB RVSP: 6 MMHG
SODIUM SERPL-SCNC: 138 MMOL/L (ref 136–145)
STOMATOCYTES BLD QL SMEAR: NORMAL
TDI LATERAL: 0.08 M/S
TDI SEPTAL: 0.08 M/S
TDI: 0.08 M/S
TR MAX PG: 31 MMHG
TRICUSPID ANNULAR PLANE SYSTOLIC EXCURSION: 1.98 CM
TV REST PULMONARY ARTERY PRESSURE: 34 MMHG
WBC # BLD AUTO: 8.96 K/UL (ref 4.5–11)
Z-SCORE OF LEFT VENTRICULAR DIMENSION IN END DIASTOLE: -16.06
Z-SCORE OF LEFT VENTRICULAR DIMENSION IN END SYSTOLE: -11.08

## 2023-08-05 PROCEDURE — 82962 GLUCOSE BLOOD TEST: CPT

## 2023-08-05 PROCEDURE — 25000003 PHARM REV CODE 250: Performed by: INTERNAL MEDICINE

## 2023-08-05 PROCEDURE — 25000242 PHARM REV CODE 250 ALT 637 W/ HCPCS

## 2023-08-05 PROCEDURE — 25000003 PHARM REV CODE 250: Performed by: HOSPITALIST

## 2023-08-05 PROCEDURE — 94660 CPAP INITIATION&MGMT: CPT

## 2023-08-05 PROCEDURE — 63600175 PHARM REV CODE 636 W HCPCS

## 2023-08-05 PROCEDURE — 25000003 PHARM REV CODE 250

## 2023-08-05 PROCEDURE — 27000190 HC CPAP FULL FACE MASK W/VALVE

## 2023-08-05 PROCEDURE — 99233 PR SUBSEQUENT HOSPITAL CARE,LEVL III: ICD-10-PCS | Mod: ,,, | Performed by: HOSPITALIST

## 2023-08-05 PROCEDURE — 20000000 HC ICU ROOM

## 2023-08-05 PROCEDURE — 31500 INSERT EMERGENCY AIRWAY: CPT

## 2023-08-05 PROCEDURE — 63600175 PHARM REV CODE 636 W HCPCS: Performed by: NURSE PRACTITIONER

## 2023-08-05 PROCEDURE — 63600175 PHARM REV CODE 636 W HCPCS: Performed by: INTERNAL MEDICINE

## 2023-08-05 PROCEDURE — 94640 AIRWAY INHALATION TREATMENT: CPT

## 2023-08-05 PROCEDURE — 31500 INTUBATION: ICD-10-PCS | Mod: ,,, | Performed by: HOSPITALIST

## 2023-08-05 PROCEDURE — 31500 INSERT EMERGENCY AIRWAY: CPT | Mod: ,,, | Performed by: HOSPITALIST

## 2023-08-05 PROCEDURE — 94002 VENT MGMT INPAT INIT DAY: CPT

## 2023-08-05 PROCEDURE — 85025 COMPLETE CBC W/AUTO DIFF WBC: CPT

## 2023-08-05 PROCEDURE — 36600 WITHDRAWAL OF ARTERIAL BLOOD: CPT

## 2023-08-05 PROCEDURE — 99233 SBSQ HOSP IP/OBS HIGH 50: CPT | Mod: ,,, | Performed by: HOSPITALIST

## 2023-08-05 PROCEDURE — 99900031 HC PATIENT EDUCATION (STAT)

## 2023-08-05 PROCEDURE — 87040 BLOOD CULTURE FOR BACTERIA: CPT | Performed by: HOSPITALIST

## 2023-08-05 PROCEDURE — 94761 N-INVAS EAR/PLS OXIMETRY MLT: CPT

## 2023-08-05 PROCEDURE — 99900035 HC TECH TIME PER 15 MIN (STAT)

## 2023-08-05 PROCEDURE — 25000003 PHARM REV CODE 250: Performed by: NURSE PRACTITIONER

## 2023-08-05 PROCEDURE — C9113 INJ PANTOPRAZOLE SODIUM, VIA: HCPCS

## 2023-08-05 PROCEDURE — 80048 BASIC METABOLIC PNL TOTAL CA: CPT

## 2023-08-05 PROCEDURE — 99900026 HC AIRWAY MAINTENANCE (STAT)

## 2023-08-05 PROCEDURE — 27000221 HC OXYGEN, UP TO 24 HOURS

## 2023-08-05 RX ORDER — ALLOPURINOL 300 MG/1
300 TABLET ORAL DAILY
Status: DISCONTINUED | OUTPATIENT
Start: 2023-08-06 | End: 2023-08-12

## 2023-08-05 RX ORDER — ROCURONIUM BROMIDE 10 MG/ML
INJECTION, SOLUTION INTRAVENOUS
Status: COMPLETED
Start: 2023-08-05 | End: 2023-08-05

## 2023-08-05 RX ORDER — PROPOFOL 10 MG/ML
0-50 INJECTION, EMULSION INTRAVENOUS CONTINUOUS
Status: DISCONTINUED | OUTPATIENT
Start: 2023-08-05 | End: 2023-08-09

## 2023-08-05 RX ORDER — CHLORDIAZEPOXIDE HYDROCHLORIDE 5 MG/1
5 CAPSULE, GELATIN COATED ORAL EVERY 6 HOURS
Status: DISCONTINUED | OUTPATIENT
Start: 2023-08-05 | End: 2023-08-05

## 2023-08-05 RX ORDER — ATORVASTATIN CALCIUM 40 MG/1
40 TABLET, FILM COATED ORAL NIGHTLY
Status: DISCONTINUED | OUTPATIENT
Start: 2023-08-06 | End: 2023-08-12

## 2023-08-05 RX ORDER — FENTANYL CITRAT/DEXTROSE 5%/PF 100 MCG/10
0-250 PATIENT CONTROLLED ANALGESIA SYRINGE INTRAVENOUS CONTINUOUS
Status: DISCONTINUED | OUTPATIENT
Start: 2023-08-05 | End: 2023-08-09

## 2023-08-05 RX ORDER — CHLORDIAZEPOXIDE HYDROCHLORIDE 5 MG/1
10 CAPSULE, GELATIN COATED ORAL EVERY 6 HOURS
Status: DISCONTINUED | OUTPATIENT
Start: 2023-08-06 | End: 2023-08-06

## 2023-08-05 RX ORDER — ETOMIDATE 2 MG/ML
20 INJECTION INTRAVENOUS ONCE
Status: DISCONTINUED | OUTPATIENT
Start: 2023-08-05 | End: 2023-08-05

## 2023-08-05 RX ORDER — PROPOFOL 10 MG/ML
INJECTION, EMULSION INTRAVENOUS
Status: COMPLETED
Start: 2023-08-05 | End: 2023-08-05

## 2023-08-05 RX ORDER — ROCURONIUM BROMIDE 10 MG/ML
50 INJECTION, SOLUTION INTRAVENOUS ONCE
Status: DISCONTINUED | OUTPATIENT
Start: 2023-08-05 | End: 2023-08-07

## 2023-08-05 RX ORDER — CHLORDIAZEPOXIDE HYDROCHLORIDE 5 MG/1
10 CAPSULE, GELATIN COATED ORAL EVERY 6 HOURS
Status: DISCONTINUED | OUTPATIENT
Start: 2023-08-05 | End: 2023-08-05

## 2023-08-05 RX ORDER — CARVEDILOL 6.25 MG/1
6.25 TABLET ORAL 2 TIMES DAILY WITH MEALS
Status: DISCONTINUED | OUTPATIENT
Start: 2023-08-06 | End: 2023-08-12

## 2023-08-05 RX ORDER — PROPOFOL 10 MG/ML
0-50 INJECTION, EMULSION INTRAVENOUS CONTINUOUS
Status: DISCONTINUED | OUTPATIENT
Start: 2023-08-05 | End: 2023-08-05

## 2023-08-05 RX ORDER — MUPIROCIN 20 MG/G
OINTMENT TOPICAL 2 TIMES DAILY
Status: COMPLETED | OUTPATIENT
Start: 2023-08-05 | End: 2023-08-10

## 2023-08-05 RX ORDER — COLCHICINE 0.6 MG/1
0.6 TABLET, FILM COATED ORAL DAILY
Status: DISCONTINUED | OUTPATIENT
Start: 2023-08-06 | End: 2023-08-12

## 2023-08-05 RX ORDER — ETOMIDATE 2 MG/ML
INJECTION INTRAVENOUS
Status: COMPLETED
Start: 2023-08-05 | End: 2023-08-05

## 2023-08-05 RX ADMIN — THIAMINE HYDROCHLORIDE 500 MG: 100 INJECTION, SOLUTION INTRAMUSCULAR; INTRAVENOUS at 10:08

## 2023-08-05 RX ADMIN — IPRATROPIUM BROMIDE AND ALBUTEROL SULFATE 3 ML: 2.5; .5 SOLUTION RESPIRATORY (INHALATION) at 07:08

## 2023-08-05 RX ADMIN — FUROSEMIDE 40 MG: 10 INJECTION, SOLUTION INTRAVENOUS at 02:08

## 2023-08-05 RX ADMIN — INSULIN ASPART 2 UNITS: 100 INJECTION, SOLUTION INTRAVENOUS; SUBCUTANEOUS at 11:08

## 2023-08-05 RX ADMIN — ETOMIDATE 20 MG: 2 INJECTION INTRAVENOUS at 07:08

## 2023-08-05 RX ADMIN — ROCURONIUM BROMIDE 50 MG: 10 INJECTION, SOLUTION INTRAVENOUS at 07:08

## 2023-08-05 RX ADMIN — THIAMINE HYDROCHLORIDE 500 MG: 100 INJECTION, SOLUTION INTRAMUSCULAR; INTRAVENOUS at 09:08

## 2023-08-05 RX ADMIN — PROPOFOL 50 MCG/KG/MIN: 10 INJECTION, EMULSION INTRAVENOUS at 08:08

## 2023-08-05 RX ADMIN — MUPIROCIN: 20 OINTMENT TOPICAL at 08:08

## 2023-08-05 RX ADMIN — CARVEDILOL 6.25 MG: 6.25 TABLET, FILM COATED ORAL at 06:08

## 2023-08-05 RX ADMIN — IPRATROPIUM BROMIDE AND ALBUTEROL SULFATE 3 ML: 2.5; .5 SOLUTION RESPIRATORY (INHALATION) at 12:08

## 2023-08-05 RX ADMIN — ATORVASTATIN CALCIUM 40 MG: 40 TABLET, FILM COATED ORAL at 09:08

## 2023-08-05 RX ADMIN — FOLIC ACID 1 MG: 5 INJECTION, SOLUTION INTRAMUSCULAR; INTRAVENOUS; SUBCUTANEOUS at 09:08

## 2023-08-05 RX ADMIN — SPIRONOLACTONE 25 MG: 25 TABLET ORAL at 09:08

## 2023-08-05 RX ADMIN — THIAMINE HYDROCHLORIDE 500 MG: 100 INJECTION, SOLUTION INTRAMUSCULAR; INTRAVENOUS at 03:08

## 2023-08-05 RX ADMIN — SODIUM CHLORIDE 125 MG: 9 INJECTION, SOLUTION INTRAVENOUS at 11:08

## 2023-08-05 RX ADMIN — PANTOPRAZOLE SODIUM 40 MG: 40 INJECTION, POWDER, FOR SOLUTION INTRAVENOUS at 09:08

## 2023-08-05 RX ADMIN — FUROSEMIDE 40 MG: 10 INJECTION, SOLUTION INTRAVENOUS at 03:08

## 2023-08-05 RX ADMIN — PROPOFOL 5 MCG/KG/MIN: 10 INJECTION, EMULSION INTRAVENOUS at 06:08

## 2023-08-05 RX ADMIN — COLCHICINE 0.6 MG: 0.6 TABLET ORAL at 09:08

## 2023-08-05 RX ADMIN — CHLORDIAZEPOXIDE HYDROCHLORIDE 25 MG: 25 CAPSULE ORAL at 12:08

## 2023-08-05 RX ADMIN — THERA TABS 1 TABLET: TAB at 09:08

## 2023-08-05 RX ADMIN — FENTANYL CITRATE 25 MCG/HR: 50 INJECTION, SOLUTION INTRAMUSCULAR; INTRAVENOUS at 08:08

## 2023-08-05 RX ADMIN — CHLORDIAZEPOXIDE HYDROCHLORIDE 25 MG: 25 CAPSULE ORAL at 06:08

## 2023-08-05 RX ADMIN — IPRATROPIUM BROMIDE AND ALBUTEROL SULFATE 3 ML: 2.5; .5 SOLUTION RESPIRATORY (INHALATION) at 01:08

## 2023-08-05 RX ADMIN — PROPOFOL 50 MCG/KG/MIN: 10 INJECTION, EMULSION INTRAVENOUS at 11:08

## 2023-08-05 RX ADMIN — ALLOPURINOL 300 MG: 300 TABLET ORAL at 09:08

## 2023-08-05 NOTE — NURSING
0732 received patient lying in bed on left side, no respiratory distress on 3L nasal cannula. Denies needs at this time. Groggy, hard to understand. Discussed POC. Left pt in bed, call bell in hand. Bed alarm on.    0946 pt sitting on edge of bed, reoriented pt to surroundings. Meds given, see MAR. Pt reports continued hand shaking. Level of consciousness unchanged. Still groggy. Left pt in bed, call bell in reach. O2 on. Bed alarm on.     1115 MD and residents rounding on floor. Notified of pts continued grogginess. Suggested ABG order. No new orders at this time.     1159 pt sitting on bedside unchanged. Messaged Dr. Brennan about possible oversedation from librium since pt is still very groggy, disoriented, hard to understand. See MAR for med admin.    1335 pt continuously reposition himself from sitting to lying. Pt had scooted to end of bed. Repositioned pt to lying position, placed taps system. Encouraged pt to continue to wear cpap and breath through nose. Safety measures in place.     1505 pt sister visiting. Update given. Requested MD to come to bedside to speak with patient.     1535 Rounds complete. Pt resting in bed with eyes closed, on bipap. See MAR for med admin. Needs met. Safety measures in place.     1723 in room to give scheduled BP meds. Pt on bipap resting with eyes closes. Pt only arousing to painful stimuli. Pt not following commands after several attempts to arouse pt. At 1729 RRT was called. During RRT, fingerstick glucose, ABG's, and vitals were taken. . /80, pulse 10, o2 92% on 50% on bipap. CXR ordered. ABG's resulted critically and pt was transferred to ICU for intubation. Family called per CN. Report given to HERMINIO Rucker in the ICU.

## 2023-08-05 NOTE — ASSESSMENT & PLAN NOTE
Duoneb Q6 and breathing treatment  Oxygen as needed    8/5: Patient endorses decrease SOB. Duoneb Q6 and breathing treatment

## 2023-08-05 NOTE — PLAN OF CARE
Problem: Adult Inpatient Plan of Care  Goal: Plan of Care Review  Outcome: Ongoing, Progressing  Goal: Optimal Comfort and Wellbeing  Outcome: Ongoing, Progressing     Problem: Bariatric Environmental Safety  Goal: Safety Maintained with Care  Outcome: Ongoing, Progressing     Problem: Renal Function Impairment (Acute Kidney Injury/Impairment)  Goal: Effective Renal Function  Outcome: Ongoing, Progressing

## 2023-08-05 NOTE — ASSESSMENT & PLAN NOTE
Ciwa score of 1 but patient had his last drink around 4 hours ago.   He drinks 6-12 drinks per night   Will start him on librium 25 mg Q6H  Lorazepam 1 mg once PRN for withdrawal or seizure  IV thiamine, folate and PO multivitamin   Eyes were yellow but liver enzymes were normal  Will do hepatitis panel  Will do liver US    8/4: Continue current management    8/5: Decreased to librium 10 mg Q6H

## 2023-08-05 NOTE — PROGRESS NOTES
[facility] - [facility] Medicine  Progress Note    Patient Name: Abilio Carroll  MRN: [id number]  Patient Class: IP- Inpatient   Admission Date: 8/2/2023  Length of Stay: 2 days  Attending Physician: Carl Brennan MD  Primary Care Provider: Brandt Rucker MD        Subjective:     Principal Problem:Acute blood loss anemia        HPI:  62 year old male, extremely a poor historian with PMHx of TERI, COPD and Alcohol abuse presented to [facility] via EMS due to weakness. Patient reports around four hours ago after having 4 beers and a marijuana joint he started feeling suddenly weak and fatigue. He could not hold any objects in his hand and would drop the food. He couldn't grab a glass of water to drink. He could not ambulate because the right leg was weak. He reports the hands and UE were both weak to the same degree. He reports the entire body was shaky and jumpy.Patient reports he has experienced episodes of this before that would only last a few seconds but now this is the first time it was constant and lasted longer. Patient reports he was feeling dizziness and felt like he is about to pass out. Room was spinning around his head. No ringing in the ear. Vision was complety ok with no deficits. Did not lose conciousness. But he did loss urine without even noticing it. Patient denies sweating, no fever, no chill, no back pain, no chest pain, no abdominal pain. He reports stool is sometimes bloody and sometimes black and has chronic constipation. He has swelling of scrotum, edema in the legs. Chronic SOB and has home oxygen he reports he wears at nights. Currently at spO2 85% on room air and 94% with 3L NC.    Patient is a poor historian, does not know a single medication he takes and has no idea about his medical history. He has HTN. Patient reports he has seen a heart doctor 5 years ago at [place] but has not seen one since. He has Diabetes and takes 40 units long acting  in the morning and SSI throughout the day. Had colonoscopy once before but doesn't remember how many years. No heart burns and never had EGD. TERI with CPAP but doesn't wear CPAP.     Current 40 pack year smoking Hx. Smokes marijuana. Drinks alcohol everyday 6-12 drinks per day.     ED course:   H/H 5.3/22  Cr 1.4  proBNP >400      Overview/Hospital Course:  8/3: patient without complaints at this time.  Plan for GI evaluation today.        Interval History: 8/5: Pt appears very lethargic and endorses decreased shortness of breath in spite of wheezing. Will continue CPAP at night & during naps. Hemoglobin has remained stable. Pt will continue to received ferrlecit due to iron deficiency.     Review of Systems   Constitutional:  Positive for fatigue. Negative for chills and fever.   Respiratory:  Positive for shortness of breath. Negative for wheezing.         SOB has improved   Cardiovascular:  Positive for leg swelling. Negative for chest pain and palpitations.   Gastrointestinal:  Positive for constipation. Negative for abdominal pain, diarrhea, nausea, rectal pain and vomiting.   Genitourinary:  Negative for difficulty urinating.   Musculoskeletal:  Negative for arthralgias and back pain.   Skin:  Negative for color change.   Neurological:  Negative for seizures, syncope, facial asymmetry, speech difficulty and numbness.   Hematological:  Negative for adenopathy.   Psychiatric/Behavioral:  Negative for agitation.      Objective:     Vital Signs (Most Recent):  Temp: 98.8 °F (37.1 °C) (08/05/23 1040)  Pulse: 104 (08/05/23 1200)  Resp: 20 (08/05/23 1200)  BP: (!) 179/89 (08/05/23 1040)  SpO2: (!) 93 % (08/05/23 1200) Vital Signs (24h Range):  Temp:  [98.2 °F (36.8 °C)-98.8 °F (37.1 °C)] 98.8 °F (37.1 °C)  Pulse:  [] 104  Resp:  [16-22] 20  SpO2:  [89 %-94 %] 93 %  BP: (149-183)/(65-98) 179/89     Weight: (!) 151 kg (332 lb 14.3 oz)  Body mass index is 43.92 kg/m².    Intake/Output Summary (Last 24 hours) at  8/5/2023 1442  Last data filed at 8/4/2023 1600  Gross per 24 hour   Intake --   Output 1000 ml   Net -1000 ml         Physical Exam  Vitals and nursing note reviewed.   Constitutional:       General: He is not in acute distress.     Appearance: Normal appearance. He is normal weight. He is not toxic-appearing.   HENT:      Head: Normocephalic and atraumatic.      Right Ear: External ear normal.      Left Ear: External ear normal.      Nose: Nose normal. No congestion or rhinorrhea.      Mouth/Throat:      Mouth: Mucous membranes are moist.   Eyes:      General: No visual field deficit.     Conjunctiva/sclera: Conjunctivae normal.   Neck:      Vascular: No carotid bruit.   Cardiovascular:      Rate and Rhythm: Normal rate and regular rhythm.      Pulses: Normal pulses.      Heart sounds: Murmur heard.      Systolic murmur is present with a grade of 2/6.   Pulmonary:      Effort: Pulmonary effort is normal. No respiratory distress.      Breath sounds: Examination of the right-lower field reveals wheezing. Examination of the left-lower field reveals wheezing. Wheezing present.   Abdominal:      General: Bowel sounds are normal. There is distension.      Palpations: There is no mass.      Tenderness: There is no abdominal tenderness. There is no right CVA tenderness or guarding.   Musculoskeletal:         General: Swelling present. No tenderness.      Cervical back: No tenderness.      Right lower leg: Edema present.      Left lower leg: Edema present.   Skin:     General: Skin is warm.      Coloration: Skin is not jaundiced.      Findings: No bruising or lesion.   Neurological:      Mental Status: He is lethargic.      Cranial Nerves: Cranial nerves 2-12 are intact. No cranial nerve deficit, dysarthria or facial asymmetry.      Sensory: Sensation is intact. No sensory deficit.      Motor: No weakness, tremor, atrophy, abnormal muscle tone or seizure activity.   Psychiatric:         Behavior: Behavior normal.              Significant Labs: All pertinent labs within the past 24 hours have been reviewed.  BMP:   Recent Labs   Lab 08/05/23  0515   *      K 4.9   CL 98   CO2 39*   BUN 12   CREATININE 0.92   CALCIUM 8.9     CBC:   Recent Labs   Lab 08/04/23  0026 08/04/23  0709 08/05/23  0515   WBC  --  8.07 8.96   HGB 7.7* 8.0* 7.7*   HCT  --  32.4* 31.7*   PLT  --  187 83*       Significant Imaging: I have reviewed all pertinent imaging results/findings within the past 24 hours.      Assessment/Plan:      * Acute blood loss anemia    Presented with H/H of 5.3/22 with MCV in 70s  Reports have been seeing bright red blood in stool and dark black tarry stools at times  FOBT pending  Protonix loading and then 40 mg BID  2 units of pRBC ordered and pending  GI consulted  NPO    8/3: GI evaluation today.   8/4: GI recommendations are to start Protonix 40 mg,   Needs screening colonscopy as outpatient.  Monitor CBC (hgb 8.0)  Fe studies reveal Fe 22, TIBC 476  Fe Sat 4%  Started Ferrlecit 125 mg for 5 days              Iron deficiency anemia  8/5: Continue ferrlecit      Alcohol abuse  Ciwa score of 1 but patient had his last drink around 4 hours ago.   He drinks 6-12 drinks per night   Will start him on librium 25 mg Q6H  Lorazepam 1 mg once PRN for withdrawal or seizure  IV thiamine, folate and PO multivitamin   Eyes were yellow but liver enzymes were normal  Will do hepatitis panel  Will do liver US    8/4: Continue current management    8/5: Decreased to librium 10 mg Q6H      KAREN (acute kidney injury)  Cr of 1.4 from the baseline of >1 ten months ago.   Will continue monitoring kidney function.      Sleep apnea    CPAP nightly and while sleeping    Gout    Currently is having a gout attack in the right hand  Stopped allopurinol and will start colchicine 0.6 BID   Uric acid pending      COPD (chronic obstructive pulmonary disease)    Duoneb Q6 and breathing treatment  Oxygen as needed    8/5: Patient endorses decrease SOB.  "Duoneb Q6 and breathing treatment      CHF (congestive heart failure)  Patient with clinical diagnosis of heart failure on the exam with crackles in the lung and infiltrates on chest xray, distended abd, edema in LE and SOB  Takes lasix 40 mg BID PO at home, will continue with lasix 40 mg BID IV here  Continue home spironolactone 25 mg   Continue Coreg with half of the home dose  Echo pending  Strict I&O  Weight daily   Cardiac monitoring  He most likely has Right sided heart failure with pHTN and would benefit from a RHC at some point    Echo from October 2022    Interpretation Summary  · The left ventricle is normal in size with normal systolic function.  · The estimated ejection fraction is 65%.  · Normal left ventricular diastolic function.  · Moderate right ventricular enlargement.  · Moderate right atrial enlargement.  · Moderate mitral regurgitation.  · Mild tricuspid regurgitation.  · Elevated central venous pressure (15 mmHg).  · The estimated PA systolic pressure is 46 mmHg.  · There is pulmonary hypertension.  · Trivial pericardial effusion.  . Continue Beta Blocker, Furosemide and Aldactone and monitor clinical status closely. Monitor on telemetry. Patient is on CHF pathway.  Monitor strict Is&Os and daily weights.  Place on fluid restriction of 1.5 L. Cardiology has not been any consulted. Continue to stress to patient importance of self efficacy and  on diet for CHF. Last BNP reviewed- and noted below No results for input(s): "BNP", "BNPTRIAGEBLO" in the last 168 hours..    Uncontrolled type 2 diabetes mellitus with hyperglycemia  Patient's FSGs are uncontrolled due to hyperglycemia on current medication regimen.  Last A1c reviewed- No results found for: "LABA1C", "HGBA1C"  Most recent fingerstick glucose reviewed- No results for input(s): "POCTGLUCOSE" in the last 24 hours.  Current correctional scale  Medium  Maintain anti-hyperglycemic dose as follows-   Antihyperglycemics (From admission, " onward)    Start     Stop Route Frequency Ordered    08/03/23 0400  insulin aspart U-100 injection 1-10 Units         -- SubQ Every 4 hours 08/03/23 0139        Hold Oral hypoglycemics while patient is in the hospital.    Essential hypertension    Currently well controlled  Continue home norvasc, half of coreg home dose, spironolactone   Changed lasix 40 mg BID PO to 40 mg IV BID      VTE Risk Mitigation (From admission, onward)         Ordered     Reason for No Pharmacological VTE Prophylaxis  Once        Question:  Reasons:  Answer:  Active Bleeding    08/03/23 0139     IP VTE HIGH RISK PATIENT  Once         08/03/23 0139     Place sequential compression device  Until discontinued         08/03/23 0139                Discharge Planning   TONG:      Code Status: Full Code   Is the patient medically ready for discharge?:     Reason for patient still in hospital (select all that apply): Treatment  Discharge Plan A: Home, Home with family                  Carol Munoz MD  Department of Hospital Medicine   [facility] Tele[facility]

## 2023-08-05 NOTE — PROCEDURES
"Abilio Carroll is a 62 y.o. male patient.    Temp: 97 °F (36.1 °C) (08/05/23 1603)  Pulse: 103 (08/05/23 1603)  Resp: 16 (08/05/23 1603)  BP: (!) 166/78 (08/05/23 1603)  SpO2: (!) 89 % (08/05/23 1603)  Weight: (!) 151 kg (332 lb 14.3 oz) (08/04/23 0455)  Height: 6' 1" (185.4 cm) (08/03/23 1141)       Intubation    Date/Time: 8/5/2023 6:13 PM  Location procedure was performed: [facility] MEDICINE    Performed by: Lianne Powers MD  Authorized by: Lianne Powers MD  Assisting provider: Yara Wells MD  Indications: respiratory failure  Intubation method: video-assisted  Patient status: paralyzed (RSI)  Preoxygenation: BVM  Sedatives: etomidate  Paralytic: rocuronium  Laryngoscope size: Glide 4  Tube size: 7.5 mm  Tube type: cuffed  Number of attempts: 1  Cricoid pressure: no  Cords visualized: yes  Post-procedure assessment: chest rise and CO2 detector  Breath sounds: equal and absent over the epigastrium  Cuff inflated: yes  ETT to lip: 25 cm  Tube secured with: ETT franklin  Chest x-ray interpreted by me.  Chest x-ray findings: endotracheal tube in appropriate position  Patient tolerance: Patient tolerated the procedure well with no immediate complications  Complications: No  Comments: Placed on ventilator initial settings  ml Rate 18, 5 PeeP. ABG ordered for repeat in 1 hour.          8/5/2023    "

## 2023-08-05 NOTE — CARE UPDATE
17:35 Called by nurse to bedside. Pt very lethargic on eval but following commands. Pt is on BIPap 12/5 at 50% O2. O2 sat is 88%. Blood glucose 190, ABG stat reveals PH 7.26, CO2 109. Transferred to Icu with 15 l NRB mask. Pt intubated shortly after arrival in ICU 2/2 concern for Pt not protecting airway and CO2 retention. see intubation note.

## 2023-08-05 NOTE — SUBJECTIVE & OBJECTIVE
Interval History: 8/5: Pt appears very lethargic and endorses decreased shortness of breath in spite of wheezing. Will continue CPAP at night & during naps. Hemoglobin has remained stable. Pt will continue to received ferrlecit due to iron deficiency.     Review of Systems   Constitutional:  Positive for fatigue. Negative for chills and fever.   Respiratory:  Positive for shortness of breath. Negative for wheezing.         SOB has improved   Cardiovascular:  Positive for leg swelling. Negative for chest pain and palpitations.   Gastrointestinal:  Positive for constipation. Negative for abdominal pain, diarrhea, nausea, rectal pain and vomiting.   Genitourinary:  Negative for difficulty urinating.   Musculoskeletal:  Negative for arthralgias and back pain.   Skin:  Negative for color change.   Neurological:  Negative for seizures, syncope, facial asymmetry, speech difficulty and numbness.   Hematological:  Negative for adenopathy.   Psychiatric/Behavioral:  Negative for agitation.      Objective:     Vital Signs (Most Recent):  Temp: 98.8 °F (37.1 °C) (08/05/23 1040)  Pulse: 104 (08/05/23 1200)  Resp: 20 (08/05/23 1200)  BP: (!) 179/89 (08/05/23 1040)  SpO2: (!) 93 % (08/05/23 1200) Vital Signs (24h Range):  Temp:  [98.2 °F (36.8 °C)-98.8 °F (37.1 °C)] 98.8 °F (37.1 °C)  Pulse:  [] 104  Resp:  [16-22] 20  SpO2:  [89 %-94 %] 93 %  BP: (149-183)/(65-98) 179/89     Weight: (!) 151 kg (332 lb 14.3 oz)  Body mass index is 43.92 kg/m².    Intake/Output Summary (Last 24 hours) at 8/5/2023 1442  Last data filed at 8/4/2023 1600  Gross per 24 hour   Intake --   Output 1000 ml   Net -1000 ml         Physical Exam  Vitals and nursing note reviewed.   Constitutional:       General: He is not in acute distress.     Appearance: Normal appearance. He is normal weight. He is not toxic-appearing.   HENT:      Head: Normocephalic and atraumatic.      Right Ear: External ear normal.      Left Ear: External ear normal.      Nose:  Nose normal. No congestion or rhinorrhea.      Mouth/Throat:      Mouth: Mucous membranes are moist.   Eyes:      General: No visual field deficit.     Conjunctiva/sclera: Conjunctivae normal.   Neck:      Vascular: No carotid bruit.   Cardiovascular:      Rate and Rhythm: Normal rate and regular rhythm.      Pulses: Normal pulses.      Heart sounds: Murmur heard.      Systolic murmur is present with a grade of 2/6.   Pulmonary:      Effort: Pulmonary effort is normal. No respiratory distress.      Breath sounds: Examination of the right-lower field reveals wheezing. Examination of the left-lower field reveals wheezing. Wheezing present.   Abdominal:      General: Bowel sounds are normal. There is distension.      Palpations: There is no mass.      Tenderness: There is no abdominal tenderness. There is no right CVA tenderness or guarding.   Musculoskeletal:         General: Swelling present. No tenderness.      Cervical back: No tenderness.      Right lower leg: Edema present.      Left lower leg: Edema present.   Skin:     General: Skin is warm.      Coloration: Skin is not jaundiced.      Findings: No bruising or lesion.   Neurological:      Mental Status: He is lethargic.      Cranial Nerves: Cranial nerves 2-12 are intact. No cranial nerve deficit, dysarthria or facial asymmetry.      Sensory: Sensation is intact. No sensory deficit.      Motor: No weakness, tremor, atrophy, abnormal muscle tone or seizure activity.   Psychiatric:         Behavior: Behavior normal.             Significant Labs: All pertinent labs within the past 24 hours have been reviewed.  BMP:   Recent Labs   Lab 08/05/23  0515   *      K 4.9   CL 98   CO2 39*   BUN 12   CREATININE 0.92   CALCIUM 8.9     CBC:   Recent Labs   Lab 08/04/23  0026 08/04/23  0709 08/05/23  0515   WBC  --  8.07 8.96   HGB 7.7* 8.0* 7.7*   HCT  --  32.4* 31.7*   PLT  --  187 83*       Significant Imaging: I have reviewed all pertinent imaging  results/findings within the past 24 hours.

## 2023-08-06 PROBLEM — J96.20 ACUTE ON CHRONIC RESPIRATORY FAILURE: Status: ACTIVE | Noted: 2023-08-04

## 2023-08-06 LAB
25(OH)D3 SERPL-MCNC: 16.2 NG/ML
ABO + RH BLD: NORMAL
AMMONIA PLAS-SCNC: 64 ΜMOL/L (ref 11–32)
ANION GAP SERPL CALCULATED.3IONS-SCNC: 4 MMOL/L (ref 7–16)
ANISOCYTOSIS BLD QL SMEAR: ABNORMAL
BASOPHILS # BLD AUTO: 0.03 K/UL (ref 0–0.2)
BASOPHILS NFR BLD AUTO: 0.3 % (ref 0–1)
BLD PROD TYP BPU: NORMAL
BLOOD UNIT EXPIRATION DATE: NORMAL
BLOOD UNIT TYPE CODE: 5100
BUN SERPL-MCNC: 11 MG/DL (ref 7–18)
BUN/CREAT SERPL: 13 (ref 6–20)
CALCIUM SERPL-MCNC: 8.7 MG/DL (ref 8.5–10.1)
CHLORIDE SERPL-SCNC: 99 MMOL/L (ref 98–107)
CO2 SERPL-SCNC: 42 MMOL/L (ref 21–32)
CREAT SERPL-MCNC: 0.87 MG/DL (ref 0.7–1.3)
CROSSMATCH INTERPRETATION: NORMAL
CRP SERPL-MCNC: 0.58 MG/DL (ref 0–0.8)
DIFFERENTIAL METHOD BLD: ABNORMAL
DISPENSE STATUS: NORMAL
EGFR (NO RACE VARIABLE) (RUSH/TITUS): 99 ML/MIN/1.73M2
EOSINOPHIL # BLD AUTO: 0.16 K/UL (ref 0–0.5)
EOSINOPHIL NFR BLD AUTO: 1.8 % (ref 1–4)
ERYTHROCYTE [DISTWIDTH] IN BLOOD BY AUTOMATED COUNT: 21.3 % (ref 11.5–14.5)
GLUCOSE SERPL-MCNC: 107 MG/DL (ref 70–105)
GLUCOSE SERPL-MCNC: 139 MG/DL (ref 70–105)
GLUCOSE SERPL-MCNC: 142 MG/DL (ref 70–105)
GLUCOSE SERPL-MCNC: 159 MG/DL (ref 70–105)
GLUCOSE SERPL-MCNC: 164 MG/DL (ref 70–105)
GLUCOSE SERPL-MCNC: 86 MG/DL (ref 74–106)
HCT VFR BLD AUTO: 27.6 % (ref 40–54)
HCT VFR BLD AUTO: 29.9 % (ref 40–54)
HGB BLD-MCNC: 6.8 G/DL (ref 13.5–18)
HGB BLD-MCNC: 7.5 G/DL (ref 13.5–18)
HYPOCHROMIA BLD QL SMEAR: ABNORMAL
IMM GRANULOCYTES # BLD AUTO: 0.04 K/UL (ref 0–0.04)
IMM GRANULOCYTES NFR BLD: 0.4 % (ref 0–0.4)
LYMPHOCYTES # BLD AUTO: 1.22 K/UL (ref 1–4.8)
LYMPHOCYTES NFR BLD AUTO: 13.6 % (ref 27–41)
MAGNESIUM SERPL-MCNC: 1.6 MG/DL (ref 1.7–2.3)
MCH RBC QN AUTO: 20.2 PG (ref 27–31)
MCHC RBC AUTO-ENTMCNC: 24.6 G/DL (ref 32–36)
MCV RBC AUTO: 82.1 FL (ref 80–96)
MONOCYTES # BLD AUTO: 1.61 K/UL (ref 0–0.8)
MONOCYTES NFR BLD AUTO: 17.9 % (ref 2–6)
MPC BLD CALC-MCNC: 9.5 FL (ref 9.4–12.4)
NEUTROPHILS # BLD AUTO: 5.93 K/UL (ref 1.8–7.7)
NEUTROPHILS NFR BLD AUTO: 66 % (ref 53–65)
NRBC # BLD AUTO: 0.05 X10E3/UL
NRBC, AUTO (.00): 0.6 %
NT-PROBNP SERPL-MCNC: 951 PG/ML (ref 1–125)
PLATELET # BLD AUTO: 166 K/UL (ref 150–400)
PLATELET MORPHOLOGY: ABNORMAL
POLYCHROMASIA BLD QL SMEAR: ABNORMAL
POTASSIUM SERPL-SCNC: 3.9 MMOL/L (ref 3.5–5.1)
RBC # BLD AUTO: 3.36 M/UL (ref 4.6–6.2)
SODIUM SERPL-SCNC: 141 MMOL/L (ref 136–145)
UNIT NUMBER: NORMAL
URATE SERPL-MCNC: 8 MG/DL (ref 3.5–7.2)
WBC # BLD AUTO: 8.99 K/UL (ref 4.5–11)

## 2023-08-06 PROCEDURE — 63600175 PHARM REV CODE 636 W HCPCS: Performed by: HOSPITALIST

## 2023-08-06 PROCEDURE — 27000221 HC OXYGEN, UP TO 24 HOURS

## 2023-08-06 PROCEDURE — 25000003 PHARM REV CODE 250

## 2023-08-06 PROCEDURE — 82962 GLUCOSE BLOOD TEST: CPT

## 2023-08-06 PROCEDURE — 94003 VENT MGMT INPAT SUBQ DAY: CPT

## 2023-08-06 PROCEDURE — 25000003 PHARM REV CODE 250: Performed by: NURSE PRACTITIONER

## 2023-08-06 PROCEDURE — P9016 RBC LEUKOCYTES REDUCED: HCPCS | Performed by: INTERNAL MEDICINE

## 2023-08-06 PROCEDURE — 94640 AIRWAY INHALATION TREATMENT: CPT

## 2023-08-06 PROCEDURE — 63600175 PHARM REV CODE 636 W HCPCS

## 2023-08-06 PROCEDURE — 63600175 PHARM REV CODE 636 W HCPCS: Performed by: NURSE PRACTITIONER

## 2023-08-06 PROCEDURE — 83735 ASSAY OF MAGNESIUM: CPT | Performed by: HOSPITALIST

## 2023-08-06 PROCEDURE — 25000003 PHARM REV CODE 250: Performed by: HOSPITALIST

## 2023-08-06 PROCEDURE — 80048 BASIC METABOLIC PNL TOTAL CA: CPT

## 2023-08-06 PROCEDURE — C9113 INJ PANTOPRAZOLE SODIUM, VIA: HCPCS | Performed by: NURSE PRACTITIONER

## 2023-08-06 PROCEDURE — 83880 ASSAY OF NATRIURETIC PEPTIDE: CPT | Performed by: HOSPITALIST

## 2023-08-06 PROCEDURE — 85025 COMPLETE CBC W/AUTO DIFF WBC: CPT

## 2023-08-06 PROCEDURE — 63600175 PHARM REV CODE 636 W HCPCS: Performed by: INTERNAL MEDICINE

## 2023-08-06 PROCEDURE — 86140 C-REACTIVE PROTEIN: CPT | Performed by: HOSPITALIST

## 2023-08-06 PROCEDURE — 82306 VITAMIN D 25 HYDROXY: CPT | Performed by: HOSPITALIST

## 2023-08-06 PROCEDURE — 99900026 HC AIRWAY MAINTENANCE (STAT)

## 2023-08-06 PROCEDURE — 99900035 HC TECH TIME PER 15 MIN (STAT)

## 2023-08-06 PROCEDURE — 20000000 HC ICU ROOM

## 2023-08-06 PROCEDURE — 36600 WITHDRAWAL OF ARTERIAL BLOOD: CPT

## 2023-08-06 PROCEDURE — 99233 PR SUBSEQUENT HOSPITAL CARE,LEVL III: ICD-10-PCS | Mod: ,,, | Performed by: NURSE PRACTITIONER

## 2023-08-06 PROCEDURE — 84550 ASSAY OF BLOOD/URIC ACID: CPT | Performed by: HOSPITALIST

## 2023-08-06 PROCEDURE — 85014 HEMATOCRIT: CPT | Performed by: HOSPITALIST

## 2023-08-06 PROCEDURE — 25000242 PHARM REV CODE 250 ALT 637 W/ HCPCS

## 2023-08-06 PROCEDURE — 94761 N-INVAS EAR/PLS OXIMETRY MLT: CPT

## 2023-08-06 PROCEDURE — 99233 SBSQ HOSP IP/OBS HIGH 50: CPT | Mod: ,,, | Performed by: NURSE PRACTITIONER

## 2023-08-06 PROCEDURE — C9113 INJ PANTOPRAZOLE SODIUM, VIA: HCPCS

## 2023-08-06 PROCEDURE — 82140 ASSAY OF AMMONIA: CPT | Performed by: HOSPITALIST

## 2023-08-06 PROCEDURE — 25000003 PHARM REV CODE 250: Performed by: INTERNAL MEDICINE

## 2023-08-06 PROCEDURE — 25000242 PHARM REV CODE 250 ALT 637 W/ HCPCS: Performed by: NURSE PRACTITIONER

## 2023-08-06 RX ORDER — PANTOPRAZOLE SODIUM 40 MG/10ML
40 INJECTION, POWDER, LYOPHILIZED, FOR SOLUTION INTRAVENOUS 2 TIMES DAILY
Status: DISCONTINUED | OUTPATIENT
Start: 2023-08-06 | End: 2023-08-12

## 2023-08-06 RX ORDER — METHYLPREDNISOLONE SOD SUCC 125 MG
40 VIAL (EA) INJECTION
Status: DISCONTINUED | OUTPATIENT
Start: 2023-08-06 | End: 2023-08-08

## 2023-08-06 RX ORDER — CHLORDIAZEPOXIDE HYDROCHLORIDE 5 MG/1
CAPSULE, GELATIN COATED ORAL
Status: DISPENSED
Start: 2023-08-06 | End: 2023-08-06

## 2023-08-06 RX ORDER — MAGNESIUM SULFATE HEPTAHYDRATE 40 MG/ML
2 INJECTION, SOLUTION INTRAVENOUS ONCE
Status: COMPLETED | OUTPATIENT
Start: 2023-08-06 | End: 2023-08-06

## 2023-08-06 RX ORDER — HYDROCODONE BITARTRATE AND ACETAMINOPHEN 500; 5 MG/1; MG/1
TABLET ORAL
Status: DISCONTINUED | OUTPATIENT
Start: 2023-08-06 | End: 2023-08-13

## 2023-08-06 RX ORDER — THIAMINE HCL 100 MG
100 TABLET ORAL DAILY
Status: DISCONTINUED | OUTPATIENT
Start: 2023-08-06 | End: 2023-08-14 | Stop reason: HOSPADM

## 2023-08-06 RX ORDER — FOLIC ACID 1 MG/1
1 TABLET ORAL DAILY
Status: DISCONTINUED | OUTPATIENT
Start: 2023-08-06 | End: 2023-08-14 | Stop reason: HOSPADM

## 2023-08-06 RX ORDER — FUROSEMIDE 10 MG/ML
40 INJECTION INTRAMUSCULAR; INTRAVENOUS ONCE
Status: COMPLETED | OUTPATIENT
Start: 2023-08-06 | End: 2023-08-06

## 2023-08-06 RX ORDER — BUDESONIDE 0.25 MG/2ML
0.25 INHALANT ORAL DAILY
Status: DISCONTINUED | OUTPATIENT
Start: 2023-08-06 | End: 2023-08-14 | Stop reason: HOSPADM

## 2023-08-06 RX ORDER — CHOLECALCIFEROL (VITAMIN D3) 25 MCG
1000 TABLET ORAL DAILY
Status: DISCONTINUED | OUTPATIENT
Start: 2023-08-07 | End: 2023-08-12

## 2023-08-06 RX ADMIN — PANTOPRAZOLE SODIUM 40 MG: 40 INJECTION, POWDER, FOR SOLUTION INTRAVENOUS at 09:08

## 2023-08-06 RX ADMIN — CARVEDILOL 6.25 MG: 6.25 TABLET, FILM COATED ORAL at 05:08

## 2023-08-06 RX ADMIN — INSULIN ASPART 1 UNITS: 100 INJECTION, SOLUTION INTRAVENOUS; SUBCUTANEOUS at 12:08

## 2023-08-06 RX ADMIN — AZITHROMYCIN DIHYDRATE 500 MG: 500 INJECTION, POWDER, LYOPHILIZED, FOR SOLUTION INTRAVENOUS at 09:08

## 2023-08-06 RX ADMIN — PROPOFOL 50 MCG/KG/MIN: 10 INJECTION, EMULSION INTRAVENOUS at 05:08

## 2023-08-06 RX ADMIN — IPRATROPIUM BROMIDE AND ALBUTEROL SULFATE 3 ML: 2.5; .5 SOLUTION RESPIRATORY (INHALATION) at 07:08

## 2023-08-06 RX ADMIN — METHYLPREDNISOLONE SODIUM SUCCINATE 40 MG: 125 INJECTION, POWDER, FOR SOLUTION INTRAMUSCULAR; INTRAVENOUS at 09:08

## 2023-08-06 RX ADMIN — PROPOFOL 50 MCG/KG/MIN: 10 INJECTION, EMULSION INTRAVENOUS at 09:08

## 2023-08-06 RX ADMIN — FUROSEMIDE 40 MG: 10 INJECTION, SOLUTION INTRAMUSCULAR; INTRAVENOUS at 09:08

## 2023-08-06 RX ADMIN — MUPIROCIN: 20 OINTMENT TOPICAL at 09:08

## 2023-08-06 RX ADMIN — IPRATROPIUM BROMIDE AND ALBUTEROL SULFATE 3 ML: 2.5; .5 SOLUTION RESPIRATORY (INHALATION) at 12:08

## 2023-08-06 RX ADMIN — ALLOPURINOL 300 MG: 300 TABLET ORAL at 09:08

## 2023-08-06 RX ADMIN — IPRATROPIUM BROMIDE AND ALBUTEROL SULFATE 3 ML: 2.5; .5 SOLUTION RESPIRATORY (INHALATION) at 01:08

## 2023-08-06 RX ADMIN — PROPOFOL 50 MCG/KG/MIN: 10 INJECTION, EMULSION INTRAVENOUS at 02:08

## 2023-08-06 RX ADMIN — BUDESONIDE 0.25 MG: 0.25 SUSPENSION RESPIRATORY (INHALATION) at 11:08

## 2023-08-06 RX ADMIN — CHLORDIAZEPOXIDE HYDROCHLORIDE 10 MG: 5 CAPSULE ORAL at 07:08

## 2023-08-06 RX ADMIN — PROPOFOL 50 MCG/KG/MIN: 10 INJECTION, EMULSION INTRAVENOUS at 10:08

## 2023-08-06 RX ADMIN — MAGNESIUM SULFATE HEPTAHYDRATE 2 G: 40 INJECTION, SOLUTION INTRAVENOUS at 09:08

## 2023-08-06 RX ADMIN — Medication 100 MG: at 09:08

## 2023-08-06 RX ADMIN — PROPOFOL 50 MCG/KG/MIN: 10 INJECTION, EMULSION INTRAVENOUS at 03:08

## 2023-08-06 RX ADMIN — CHLORDIAZEPOXIDE HYDROCHLORIDE 10 MG: 5 CAPSULE ORAL at 12:08

## 2023-08-06 RX ADMIN — ATORVASTATIN CALCIUM 40 MG: 40 TABLET, FILM COATED ORAL at 09:08

## 2023-08-06 RX ADMIN — PROPOFOL 50 MCG/KG/MIN: 10 INJECTION, EMULSION INTRAVENOUS at 01:08

## 2023-08-06 RX ADMIN — PIPERACILLIN AND TAZOBACTAM 4.5 G: 4; .5 INJECTION, POWDER, FOR SOLUTION INTRAVENOUS; PARENTERAL at 06:08

## 2023-08-06 RX ADMIN — FUROSEMIDE 40 MG: 10 INJECTION, SOLUTION INTRAVENOUS at 03:08

## 2023-08-06 RX ADMIN — DEXTROSE MONOHYDRATE 1 G: 5 INJECTION INTRAVENOUS at 09:08

## 2023-08-06 RX ADMIN — COLCHICINE 0.6 MG: 0.6 TABLET ORAL at 09:08

## 2023-08-06 RX ADMIN — INSULIN ASPART 1 UNITS: 100 INJECTION, SOLUTION INTRAVENOUS; SUBCUTANEOUS at 11:08

## 2023-08-06 RX ADMIN — THERA TABS 1 TABLET: TAB at 09:08

## 2023-08-06 RX ADMIN — FOLIC ACID 1 MG: 1 TABLET ORAL at 09:08

## 2023-08-06 RX ADMIN — PROPOFOL 50 MCG/KG/MIN: 10 INJECTION, EMULSION INTRAVENOUS at 11:08

## 2023-08-06 RX ADMIN — SODIUM CHLORIDE 125 MG: 9 INJECTION, SOLUTION INTRAVENOUS at 12:08

## 2023-08-06 RX ADMIN — PROPOFOL 50 MCG/KG/MIN: 10 INJECTION, EMULSION INTRAVENOUS at 07:08

## 2023-08-06 RX ADMIN — CARVEDILOL 6.25 MG: 6.25 TABLET, FILM COATED ORAL at 09:08

## 2023-08-06 RX ADMIN — PROPOFOL 50 MCG/KG/MIN: 10 INJECTION, EMULSION INTRAVENOUS at 06:08

## 2023-08-06 NOTE — ASSESSMENT & PLAN NOTE
Patient with clinical diagnosis of heart failure on the exam with crackles in the lung and infiltrates on chest xray, distended abd, edema in LE and SOB  Takes lasix 40 mg BID PO at home, will continue with lasix 40 mg BID IV here  Continue home spironolactone 25 mg   Continue Coreg with half of the home dose  Echo as below   Strict I&O  Weight daily       Left Ventricle: The left ventricle is normal in size. Normal wall thickness. Normal wall motion. There is normal systolic function with a visually estimated ejection fraction of 55 - 60%. There is normal diastolic function.    Left Atrium: Left atrium is mildly dilated.    Right Ventricle: Normal right ventricular cavity size. Wall thickness is normal. Right ventricle wall motion  is normal. Systolic function is normal.    Mitral Valve: There is trace regurgitation.    Tricuspid Valve: There is trace regurgitation.    Pulmonic Valve: There is no significant stenosis. The estimated PA systolic pressure is 34 mmHg.    IVC/SVC: Normal venous pressure at 3 mmHg.

## 2023-08-06 NOTE — ASSESSMENT & PLAN NOTE
Presented with H/H of 5.3/22 with MCV in 70s  Reports have been seeing bright red blood in stool and dark black tarry stools at times  FOBT pending  Protonix loading and then 40 mg BID  2 units of pRBC ordered and pending  GI consulted  NPO    8/3: GI evaluation today.   8/6: s/p EGD continue protonix BID

## 2023-08-06 NOTE — PLAN OF CARE
Problem: Adult Inpatient Plan of Care  Goal: Plan of Care Review  Outcome: Ongoing, Progressing  Goal: Patient-Specific Goal (Individualized)  Outcome: Ongoing, Progressing  Goal: Absence of Hospital-Acquired Illness or Injury  Outcome: Ongoing, Progressing  Goal: Optimal Comfort and Wellbeing  Outcome: Ongoing, Progressing  Goal: Readiness for Transition of Care  Outcome: Ongoing, Progressing     Problem: Bariatric Environmental Safety  Goal: Safety Maintained with Care  Outcome: Ongoing, Progressing     Problem: Diabetes Comorbidity  Goal: Blood Glucose Level Within Targeted Range  Outcome: Ongoing, Progressing     Problem: Fluid and Electrolyte Imbalance (Acute Kidney Injury/Impairment)  Goal: Fluid and Electrolyte Balance  Outcome: Ongoing, Progressing     Problem: Oral Intake Inadequate (Acute Kidney Injury/Impairment)  Goal: Optimal Nutrition Intake  Outcome: Ongoing, Progressing     Problem: Renal Function Impairment (Acute Kidney Injury/Impairment)  Goal: Effective Renal Function  Outcome: Ongoing, Progressing     Problem: Gas Exchange Impaired  Goal: Optimal Gas Exchange  Outcome: Ongoing, Progressing     Problem: Fall Injury Risk  Goal: Absence of Fall and Fall-Related Injury  Outcome: Ongoing, Progressing     Problem: Impaired Wound Healing  Goal: Optimal Wound Healing  Outcome: Ongoing, Progressing     Problem: Restraint, Nonbehavioral (Nonviolent)  Goal: Absence of Harm or Injury  Outcome: Ongoing, Progressing     Problem: Infection  Goal: Absence of Infection Signs and Symptoms  Outcome: Ongoing, Progressing     Problem: Communication Impairment (Mechanical Ventilation, Invasive)  Goal: Effective Communication  Outcome: Ongoing, Progressing     Problem: Device-Related Complication Risk (Mechanical Ventilation, Invasive)  Goal: Optimal Device Function  Outcome: Ongoing, Progressing     Problem: Inability to Wean (Mechanical Ventilation, Invasive)  Goal: Mechanical Ventilation Liberation  Outcome:  Ongoing, Progressing     Problem: Nutrition Impairment (Mechanical Ventilation, Invasive)  Goal: Optimal Nutrition Delivery  Outcome: Ongoing, Progressing     Problem: Skin and Tissue Injury (Mechanical Ventilation, Invasive)  Goal: Absence of Device-Related Skin and Tissue Injury  Outcome: Ongoing, Progressing     Problem: Ventilator-Induced Lung Injury (Mechanical Ventilation, Invasive)  Goal: Absence of Ventilator-Induced Lung Injury  Outcome: Ongoing, Progressing

## 2023-08-06 NOTE — ASSESSMENT & PLAN NOTE
- has some compliance issues with bipap at baseline  - respiratory distress with hypercapnia yesterday requiring intubation  - duonebs, ICS, systemic steroids, abx   - continue lasix with extra dose today  - repeat CXR in AM  - rest on vent today

## 2023-08-06 NOTE — ASSESSMENT & PLAN NOTE
Ciwa score of 1 but patient had his last drink around 4 hours ago.   He drinks 6-12 drinks per night   Will start him on librium 25 mg Q6H  Lorazepam 1 mg once PRN for withdrawal or seizure  IV thiamine, folate and PO multivitamin   Eyes were yellow but liver enzymes were normal  Will do hepatitis panel  Will do liver US    8/4: Continue current management    8/5: Decreased to librium 10 mg Q6H  8/6- continue to wean off librium; MVI, folic acid, thiamine

## 2023-08-06 NOTE — ASSESSMENT & PLAN NOTE
Cr of 1.4 from the baseline of >1 ten months ago.   Will continue monitoring kidney function.  8/6- Cr 0.87

## 2023-08-06 NOTE — PROGRESS NOTES
[facility]  Pulmonology  Progress Note    Patient Name: Abilio Carroll  MRN: [id number]  Admission Date: 8/2/2023  Hospital Length of Stay: 3 days  Code Status: Full Code  Attending Provider: Carl Brennan MD  Primary Care Provider: Brandt Rucker MD   Principal Problem: Iron deficiency anemia    Subjective:     Interval History: Pt resting in bed. Intubated and sedated. Sedation ongoing. DOLL's. Will rest on vent today and start weaning tomorrow if able.       Objective:     Vital Signs (Most Recent):  Temp: 99.3 °F (37.4 °C) (08/06/23 1200)  Pulse: 86 (08/06/23 1200)  Resp: (!) 22 (08/06/23 1200)  BP: 126/60 (08/06/23 1200)  SpO2: 96 % (08/06/23 1200) Vital Signs (24h Range):  Temp:  [97 °F (36.1 °C)-100.2 °F (37.9 °C)] 99.3 °F (37.4 °C)  Pulse:  [] 86  Resp:  [9-22] 22  SpO2:  [89 %-100 %] 96 %  BP: (108-195)/() 126/60     Weight: (!) 147.1 kg (324 lb 4.8 oz)  Body mass index is 42.79 kg/m².      Intake/Output Summary (Last 24 hours) at 8/6/2023 1238  Last data filed at 8/6/2023 1200  Gross per 24 hour   Intake 1512.98 ml   Output 3975 ml   Net -2462.02 ml        Physical Exam  Vitals and nursing note reviewed.   Constitutional:       Interventions: He is sedated and intubated.   HENT:      Head: Normocephalic.      Mouth/Throat:      Mouth: Mucous membranes are moist.   Eyes:      Conjunctiva/sclera: Conjunctivae normal.   Cardiovascular:      Rate and Rhythm: Normal rate and regular rhythm.      Pulses: Normal pulses.      Heart sounds: Murmur heard.      Systolic murmur is present with a grade of 2/6.   Pulmonary:      Effort: Pulmonary effort is normal. No respiratory distress. He is intubated.      Breath sounds: Examination of the right-lower field reveals wheezing. Examination of the left-lower field reveals wheezing. Wheezing present.   Abdominal:      General: Bowel sounds are normal. There is distension.   Musculoskeletal:         General: No tenderness.      Right lower  leg: Edema present.      Left lower leg: Edema present.   Skin:     General: Skin is warm.   Neurological:      Cranial Nerves: Cranial nerves 2-12 are intact.      Sensory: Sensation is intact.           Review of Systems    Vents:  Vent Mode: A/C (08/06/23 1106)  Set Rate: 21 BPM (08/06/23 1106)  Vt Set: 500 mL (08/06/23 1106)  PEEP/CPAP: 5 cmH20 (08/06/23 1106)  Oxygen Concentration (%): 80 (08/06/23 0756)  Peak Airway Pressure: 41 cmH20 (08/06/23 1106)  Total Ve: 10.9 L/m (08/06/23 1106)  F/VT Ratio<105 (RSBI): (!) 41.58 (08/06/23 1106)    Lines/Drains/Airways       Drain  Duration                  NG/OG Tube 08/05/23 1755 [facility] sump Right nostril <1 day         Urethral Catheter 08/05/23 1800 Straight-tip <1 day              Airway  Duration                  Airway - Non-Surgical 08/05/23 1751 Endotracheal Tube <1 day              Peripheral Intravenous Line  Duration                  Peripheral IV - Single Lumen 08/03/23 0355 20 G Left;Posterior Hand 3 days         Peripheral IV - Single Lumen 08/05/23 1920 20 G Anterior;Distal;Right Upper Arm <1 day         Peripheral IV - Single Lumen 08/06/23 0940 20 G Anterior;Right Forearm <1 day                    Significant Labs:    CBC/Anemia Profile:  Recent Labs   Lab 08/05/23  0515 08/06/23  0408 08/06/23  1123   WBC 8.96 8.99  --    HGB 7.7* 6.8* 7.5*   HCT 31.7* 27.6* 29.9*   PLT 83* 166  --    MCV 82.6 82.1  --    RDW 20.5* 21.3*  --         Chemistries:  Recent Labs   Lab 08/05/23  0515 08/06/23  0407    141   K 4.9 3.9   CL 98 99   CO2 39* 42*   BUN 12 11   CREATININE 0.92 0.87   CALCIUM 8.9 8.7   MG  --  1.6*       All pertinent labs within the past 24 hours have been reviewed.    Significant Imaging:  I have reviewed all pertinent imaging results/findings within the past 24 hours.    Assessment/Plan:     Psychiatric  Alcohol abuse  Ciwa score of 1 but patient had his last drink around 4 hours ago.   He drinks 6-12 drinks per night   Will start him on  librium 25 mg Q6H  Lorazepam 1 mg once PRN for withdrawal or seizure  IV thiamine, folate and PO multivitamin   Eyes were yellow but liver enzymes were normal  Will do hepatitis panel  Will do liver US    8/4: Continue current management    8/5: Decreased to librium 10 mg Q6H  8/6- continue to wean off librium; MVI, folic acid, thiamine     Pulmonary  Acute on chronic respiratory failure  - has some compliance issues with bipap at baseline  - respiratory distress with hypercapnia yesterday requiring intubation  - duonebs, ICS, systemic steroids, abx   - continue lasix with extra dose today  - repeat CXR in AM  - rest on vent today     COPD (chronic obstructive pulmonary disease)    Duoneb Q6 and breathing treatment  Oxygen as needed    8/5: Patient endorses decrease SOB. Duoneb Q6 and breathing treatment    8/6- respiratory failure overnight requiring intubation   continue duonebs, add ICS and systemic steroids, abx   Repeat CXR in AM     Cardiac/Vascular  CHF (congestive heart failure)  Patient with clinical diagnosis of heart failure on the exam with crackles in the lung and infiltrates on chest xray, distended abd, edema in LE and SOB  Takes lasix 40 mg BID PO at home, will continue with lasix 40 mg BID IV here  Continue home spironolactone 25 mg   Continue Coreg with half of the home dose  Echo as below   Strict I&O  Weight daily       Left Ventricle: The left ventricle is normal in size. Normal wall thickness. Normal wall motion. There is normal systolic function with a visually estimated ejection fraction of 55 - 60%. There is normal diastolic function.    Left Atrium: Left atrium is mildly dilated.    Right Ventricle: Normal right ventricular cavity size. Wall thickness is normal. Right ventricle wall motion  is normal. Systolic function is normal.    Mitral Valve: There is trace regurgitation.    Tricuspid Valve: There is trace regurgitation.    Pulmonic Valve: There is no significant stenosis. The  estimated PA systolic pressure is 34 mmHg.    IVC/SVC: Normal venous pressure at 3 mmHg.           Renal/  KAREN (acute kidney injury)  Cr of 1.4 from the baseline of >1 ten months ago.   Will continue monitoring kidney function.  8/6- Cr 0.87     Oncology  * Iron deficiency anemia  - continue IV iron     Acute blood loss anemia    Presented with H/H of 5.3/22 with MCV in 70s  Reports have been seeing bright red blood in stool and dark black tarry stools at times  FOBT pending  Protonix loading and then 40 mg BID  2 units of pRBC ordered and pending  GI consulted  NPO    8/3: GI evaluation today.   8/6: s/p EGD continue protonix BID    Endocrine  Uncontrolled type 2 diabetes mellitus with hyperglycemia  - continue SSI   - monitor BG trend and adjust as needed                  JOHNNY Garza-AGAP  Pulmonology  [facility]

## 2023-08-06 NOTE — SUBJECTIVE & OBJECTIVE
Interval History: Pt resting in bed. Intubated and sedated. Sedation ongoing. [place] Will rest on vent today and start weaning tomorrow if able.       Objective:     Vital Signs (Most Recent):  Temp: 99.3 °F (37.4 °C) (08/06/23 1200)  Pulse: 86 (08/06/23 1200)  Resp: (!) 22 (08/06/23 1200)  BP: 126/60 (08/06/23 1200)  SpO2: 96 % (08/06/23 1200) Vital Signs (24h Range):  Temp:  [97 °F (36.1 °C)-100.2 °F (37.9 °C)] 99.3 °F (37.4 °C)  Pulse:  [] 86  Resp:  [9-22] 22  SpO2:  [89 %-100 %] 96 %  BP: (108-195)/() 126/60     Weight: (!) 147.1 kg (324 lb 4.8 oz)  Body mass index is 42.79 kg/m².      Intake/Output Summary (Last 24 hours) at 8/6/2023 1238  Last data filed at 8/6/2023 1200  Gross per 24 hour   Intake 1512.98 ml   Output 3975 ml   Net -2462.02 ml        Physical Exam  Vitals and nursing note reviewed.   Constitutional:       Interventions: He is sedated and intubated.   HENT:      Head: Normocephalic.      Mouth/Throat:      Mouth: Mucous membranes are moist.   Eyes:      Conjunctiva/sclera: Conjunctivae normal.   Cardiovascular:      Rate and Rhythm: Normal rate and regular rhythm.      Pulses: Normal pulses.      Heart sounds: Murmur heard.      Systolic murmur is present with a grade of 2/6.   Pulmonary:      Effort: Pulmonary effort is normal. No respiratory distress. He is intubated.      Breath sounds: Examination of the right-lower field reveals wheezing. Examination of the left-lower field reveals wheezing. Wheezing present.   Abdominal:      General: Bowel sounds are normal. There is distension.   Musculoskeletal:         General: No tenderness.      Right lower leg: Edema present.      Left lower leg: Edema present.   Skin:     General: Skin is warm.   Neurological:      Cranial Nerves: Cranial nerves 2-12 are intact.      Sensory: Sensation is intact.           Review of Systems    Vents:  Vent Mode: A/C (08/06/23 1106)  Set Rate: 21 BPM (08/06/23 1106)  Vt Set: 500 mL (08/06/23  1106)  PEEP/CPAP: 5 cmH20 (08/06/23 1106)  Oxygen Concentration (%): 80 (08/06/23 0756)  Peak Airway Pressure: 41 cmH20 (08/06/23 1106)  Total Ve: 10.9 L/m (08/06/23 1106)  F/VT Ratio<105 (RSBI): (!) 41.58 (08/06/23 1106)    Lines/Drains/Airways       Drain  Duration                  NG/OG Tube 08/05/23 1755 [place] sump Right nostril <1 day         Urethral Catheter 08/05/23 1800 Straight-tip <1 day              Airway  Duration                  Airway - Non-Surgical 08/05/23 1751 Endotracheal Tube <1 day              Peripheral Intravenous Line  Duration                  Peripheral IV - Single Lumen 08/03/23 0355 20 G Left;Posterior Hand 3 days         Peripheral IV - Single Lumen 08/05/23 1920 20 G Anterior;Distal;Right Upper Arm <1 day         Peripheral IV - Single Lumen 08/06/23 0940 20 G Anterior;Right Forearm <1 day                    Significant Labs:    CBC/Anemia Profile:  Recent Labs   Lab 08/05/23  0515 08/06/23  0408 08/06/23  1123   WBC 8.96 8.99  --    HGB 7.7* 6.8* 7.5*   HCT 31.7* 27.6* 29.9*   PLT 83* 166  --    MCV 82.6 82.1  --    RDW 20.5* 21.3*  --         Chemistries:  Recent Labs   Lab 08/05/23  0515 08/06/23  0407    141   K 4.9 3.9   CL 98 99   CO2 39* 42*   BUN 12 11   CREATININE 0.92 0.87   CALCIUM 8.9 8.7   MG  --  1.6*       All pertinent labs within the past 24 hours have been reviewed.    Significant Imaging:  I have reviewed all pertinent imaging results/findings within the past 24 hours.

## 2023-08-06 NOTE — ASSESSMENT & PLAN NOTE
Duoneb Q6 and breathing treatment  Oxygen as needed    8/5: Patient endorses decrease SOB. Duoneb Q6 and breathing treatment    8/6- respiratory failure overnight requiring intubation   continue duonebs, add ICS and systemic steroids, abx   Repeat CXR in AM

## 2023-08-07 LAB
ANION GAP SERPL CALCULATED.3IONS-SCNC: 7 MMOL/L (ref 7–16)
ANISOCYTOSIS BLD QL SMEAR: ABNORMAL
BASOPHILS # BLD AUTO: 0.01 K/UL (ref 0–0.2)
BASOPHILS NFR BLD AUTO: 0.1 % (ref 0–1)
BUN SERPL-MCNC: 16 MG/DL (ref 7–18)
BUN/CREAT SERPL: 15 (ref 6–20)
CALCIUM SERPL-MCNC: 9.4 MG/DL (ref 8.5–10.1)
CHLORIDE SERPL-SCNC: 94 MMOL/L (ref 98–107)
CO2 SERPL-SCNC: 43 MMOL/L (ref 21–32)
CREAT SERPL-MCNC: 1.05 MG/DL (ref 0.7–1.3)
DIFFERENTIAL METHOD BLD: ABNORMAL
EGFR (NO RACE VARIABLE) (RUSH/TITUS): 81 ML/MIN/1.73M2
EOSINOPHIL # BLD AUTO: 0 K/UL (ref 0–0.5)
EOSINOPHIL NFR BLD AUTO: 0 % (ref 1–4)
ERYTHROCYTE [DISTWIDTH] IN BLOOD BY AUTOMATED COUNT: 22.5 % (ref 11.5–14.5)
GLUCOSE SERPL-MCNC: 136 MG/DL (ref 70–105)
GLUCOSE SERPL-MCNC: 139 MG/DL (ref 70–105)
GLUCOSE SERPL-MCNC: 155 MG/DL (ref 70–105)
GLUCOSE SERPL-MCNC: 173 MG/DL (ref 74–106)
GLUCOSE SERPL-MCNC: 190 MG/DL (ref 70–105)
HCT VFR BLD AUTO: 32.2 % (ref 40–54)
HGB BLD-MCNC: 8.5 G/DL (ref 13.5–18)
HYPOCHROMIA BLD QL SMEAR: ABNORMAL
IMM GRANULOCYTES # BLD AUTO: 0.05 K/UL (ref 0–0.04)
IMM GRANULOCYTES NFR BLD: 0.5 % (ref 0–0.4)
LYMPHOCYTES # BLD AUTO: 0.38 K/UL (ref 1–4.8)
LYMPHOCYTES NFR BLD AUTO: 3.8 % (ref 27–41)
MCH RBC QN AUTO: 20.8 PG (ref 27–31)
MCHC RBC AUTO-ENTMCNC: 26.4 G/DL (ref 32–36)
MCV RBC AUTO: 78.9 FL (ref 80–96)
MONOCYTES # BLD AUTO: 0.19 K/UL (ref 0–0.8)
MONOCYTES NFR BLD AUTO: 1.9 % (ref 2–6)
MPC BLD CALC-MCNC: 9.6 FL (ref 9.4–12.4)
NEUTROPHILS # BLD AUTO: 9.5 K/UL (ref 1.8–7.7)
NEUTROPHILS NFR BLD AUTO: 93.7 % (ref 53–65)
NRBC # BLD AUTO: 0.04 X10E3/UL
NRBC, AUTO (.00): 0.4 %
PLATELET # BLD AUTO: 200 K/UL (ref 150–400)
PLATELET MORPHOLOGY: ABNORMAL
POLYCHROMASIA BLD QL SMEAR: ABNORMAL
POTASSIUM SERPL-SCNC: 3.6 MMOL/L (ref 3.5–5.1)
RBC # BLD AUTO: 4.08 M/UL (ref 4.6–6.2)
SODIUM SERPL-SCNC: 140 MMOL/L (ref 136–145)
WBC # BLD AUTO: 10.13 K/UL (ref 4.5–11)

## 2023-08-07 PROCEDURE — 63600175 PHARM REV CODE 636 W HCPCS

## 2023-08-07 PROCEDURE — 94003 VENT MGMT INPAT SUBQ DAY: CPT

## 2023-08-07 PROCEDURE — 99900026 HC AIRWAY MAINTENANCE (STAT)

## 2023-08-07 PROCEDURE — 94640 AIRWAY INHALATION TREATMENT: CPT

## 2023-08-07 PROCEDURE — 80048 BASIC METABOLIC PNL TOTAL CA: CPT

## 2023-08-07 PROCEDURE — 63600175 PHARM REV CODE 636 W HCPCS: Performed by: NURSE PRACTITIONER

## 2023-08-07 PROCEDURE — 99291 CRITICAL CARE FIRST HOUR: CPT | Mod: ,,, | Performed by: INTERNAL MEDICINE

## 2023-08-07 PROCEDURE — 20000000 HC ICU ROOM

## 2023-08-07 PROCEDURE — 63600175 PHARM REV CODE 636 W HCPCS: Performed by: HOSPITALIST

## 2023-08-07 PROCEDURE — 82962 GLUCOSE BLOOD TEST: CPT

## 2023-08-07 PROCEDURE — 25000242 PHARM REV CODE 250 ALT 637 W/ HCPCS: Performed by: NURSE PRACTITIONER

## 2023-08-07 PROCEDURE — 94761 N-INVAS EAR/PLS OXIMETRY MLT: CPT

## 2023-08-07 PROCEDURE — 63600175 PHARM REV CODE 636 W HCPCS: Performed by: INTERNAL MEDICINE

## 2023-08-07 PROCEDURE — 85025 COMPLETE CBC W/AUTO DIFF WBC: CPT

## 2023-08-07 PROCEDURE — 99291 PR CRITICAL CARE, E/M 30-74 MINUTES: ICD-10-PCS | Mod: ,,, | Performed by: INTERNAL MEDICINE

## 2023-08-07 PROCEDURE — 25000242 PHARM REV CODE 250 ALT 637 W/ HCPCS

## 2023-08-07 PROCEDURE — 25000003 PHARM REV CODE 250: Performed by: HOSPITALIST

## 2023-08-07 PROCEDURE — C9113 INJ PANTOPRAZOLE SODIUM, VIA: HCPCS | Performed by: NURSE PRACTITIONER

## 2023-08-07 PROCEDURE — 25000003 PHARM REV CODE 250

## 2023-08-07 PROCEDURE — 27000221 HC OXYGEN, UP TO 24 HOURS

## 2023-08-07 PROCEDURE — 99900035 HC TECH TIME PER 15 MIN (STAT)

## 2023-08-07 PROCEDURE — 25000003 PHARM REV CODE 250: Performed by: NURSE PRACTITIONER

## 2023-08-07 RX ORDER — AMLODIPINE BESYLATE 10 MG/1
10 TABLET ORAL DAILY
Status: DISCONTINUED | OUTPATIENT
Start: 2023-08-07 | End: 2023-08-14 | Stop reason: HOSPADM

## 2023-08-07 RX ORDER — LABETALOL HYDROCHLORIDE 5 MG/ML
10 INJECTION, SOLUTION INTRAVENOUS EVERY 4 HOURS PRN
Status: DISCONTINUED | OUTPATIENT
Start: 2023-08-07 | End: 2023-08-14 | Stop reason: HOSPADM

## 2023-08-07 RX ADMIN — ALLOPURINOL 300 MG: 300 TABLET ORAL at 08:08

## 2023-08-07 RX ADMIN — Medication 100 MG: at 08:08

## 2023-08-07 RX ADMIN — PANTOPRAZOLE SODIUM 40 MG: 40 INJECTION, POWDER, FOR SOLUTION INTRAVENOUS at 08:08

## 2023-08-07 RX ADMIN — IPRATROPIUM BROMIDE AND ALBUTEROL SULFATE 3 ML: 2.5; .5 SOLUTION RESPIRATORY (INHALATION) at 12:08

## 2023-08-07 RX ADMIN — FUROSEMIDE 40 MG: 10 INJECTION, SOLUTION INTRAVENOUS at 03:08

## 2023-08-07 RX ADMIN — AMLODIPINE BESYLATE 10 MG: 10 TABLET ORAL at 03:08

## 2023-08-07 RX ADMIN — PROPOFOL 50 MCG/KG/MIN: 10 INJECTION, EMULSION INTRAVENOUS at 06:08

## 2023-08-07 RX ADMIN — MUPIROCIN: 20 OINTMENT TOPICAL at 08:08

## 2023-08-07 RX ADMIN — PROPOFOL 50 MCG/KG/MIN: 10 INJECTION, EMULSION INTRAVENOUS at 07:08

## 2023-08-07 RX ADMIN — LABETALOL HYDROCHLORIDE 10 MG: 5 INJECTION INTRAVENOUS at 09:08

## 2023-08-07 RX ADMIN — IPRATROPIUM BROMIDE AND ALBUTEROL SULFATE 3 ML: 2.5; .5 SOLUTION RESPIRATORY (INHALATION) at 07:08

## 2023-08-07 RX ADMIN — METHYLPREDNISOLONE SODIUM SUCCINATE 40 MG: 125 INJECTION, POWDER, FOR SOLUTION INTRAMUSCULAR; INTRAVENOUS at 11:08

## 2023-08-07 RX ADMIN — PIPERACILLIN AND TAZOBACTAM 4.5 G: 4; .5 INJECTION, POWDER, FOR SOLUTION INTRAVENOUS; PARENTERAL at 06:08

## 2023-08-07 RX ADMIN — PROPOFOL 50 MCG/KG/MIN: 10 INJECTION, EMULSION INTRAVENOUS at 04:08

## 2023-08-07 RX ADMIN — ATORVASTATIN CALCIUM 40 MG: 40 TABLET, FILM COATED ORAL at 09:08

## 2023-08-07 RX ADMIN — BUDESONIDE 0.25 MG: 0.25 SUSPENSION RESPIRATORY (INHALATION) at 07:08

## 2023-08-07 RX ADMIN — SODIUM CHLORIDE 125 MG: 9 INJECTION, SOLUTION INTRAVENOUS at 12:08

## 2023-08-07 RX ADMIN — THERA TABS 1 TABLET: TAB at 08:08

## 2023-08-07 RX ADMIN — COLCHICINE 0.6 MG: 0.6 TABLET ORAL at 08:08

## 2023-08-07 RX ADMIN — PROPOFOL 45 MCG/KG/MIN: 10 INJECTION, EMULSION INTRAVENOUS at 11:08

## 2023-08-07 RX ADMIN — INSULIN ASPART 2 UNITS: 100 INJECTION, SOLUTION INTRAVENOUS; SUBCUTANEOUS at 06:08

## 2023-08-07 RX ADMIN — PROPOFOL 50 MCG/KG/MIN: 10 INJECTION, EMULSION INTRAVENOUS at 09:08

## 2023-08-07 RX ADMIN — MUPIROCIN: 20 OINTMENT TOPICAL at 09:08

## 2023-08-07 RX ADMIN — METHYLPREDNISOLONE SODIUM SUCCINATE 40 MG: 125 INJECTION, POWDER, FOR SOLUTION INTRAMUSCULAR; INTRAVENOUS at 09:08

## 2023-08-07 RX ADMIN — PROPOFOL 40 MCG/KG/MIN: 10 INJECTION, EMULSION INTRAVENOUS at 05:08

## 2023-08-07 RX ADMIN — CARVEDILOL 6.25 MG: 6.25 TABLET, FILM COATED ORAL at 08:08

## 2023-08-07 RX ADMIN — FOLIC ACID 1 MG: 1 TABLET ORAL at 08:08

## 2023-08-07 RX ADMIN — Medication 1000 UNITS: at 08:08

## 2023-08-07 RX ADMIN — PIPERACILLIN AND TAZOBACTAM 4.5 G: 4; .5 INJECTION, POWDER, FOR SOLUTION INTRAVENOUS; PARENTERAL at 02:08

## 2023-08-07 RX ADMIN — AZITHROMYCIN DIHYDRATE 500 MG: 500 INJECTION, POWDER, LYOPHILIZED, FOR SOLUTION INTRAVENOUS at 11:08

## 2023-08-07 RX ADMIN — CARVEDILOL 6.25 MG: 6.25 TABLET, FILM COATED ORAL at 05:08

## 2023-08-07 RX ADMIN — PROPOFOL 50 MCG/KG/MIN: 10 INJECTION, EMULSION INTRAVENOUS at 02:08

## 2023-08-07 RX ADMIN — FUROSEMIDE 40 MG: 10 INJECTION, SOLUTION INTRAVENOUS at 02:08

## 2023-08-07 RX ADMIN — PIPERACILLIN AND TAZOBACTAM 4.5 G: 4; .5 INJECTION, POWDER, FOR SOLUTION INTRAVENOUS; PARENTERAL at 11:08

## 2023-08-07 RX ADMIN — PROPOFOL 45 MCG/KG/MIN: 10 INJECTION, EMULSION INTRAVENOUS at 10:08

## 2023-08-07 RX ADMIN — PANTOPRAZOLE SODIUM 40 MG: 40 INJECTION, POWDER, FOR SOLUTION INTRAVENOUS at 09:08

## 2023-08-07 RX ADMIN — PROPOFOL 35 MCG/KG/MIN: 10 INJECTION, EMULSION INTRAVENOUS at 02:08

## 2023-08-07 NOTE — ASSESSMENT & PLAN NOTE
Presented with H/H of 5.3/22 with MCV in 70s  Reports have been seeing bright red blood in stool and dark black tarry stools at times  FOBT pending  Protonix loading and then 40 mg BID  2 units of pRBC ordered and pending  GI consulted  NPO    8/3: GI evaluation today.   8/6: s/p EGD continue protonix BID  Counts stable

## 2023-08-07 NOTE — PLAN OF CARE
Chart review. Patient is on vent. Plans to start weaning trials. On iv meds. Dc plan is home with family. Will follow.

## 2023-08-07 NOTE — ASSESSMENT & PLAN NOTE
Duoneb Q6 and breathing treatment  Oxygen as needed    8/5: Patient endorses decrease SOB. Duoneb Q6 and breathing treatment    8/6- respiratory failure overnight requiring intubation   continue duonebs, add ICS and systemic steroids, abx   Repeat CXR in AM   8/7- CXR with increased infiltrates; continue current tx

## 2023-08-07 NOTE — PLAN OF CARE
Problem: Adult Inpatient Plan of Care  Goal: Plan of Care Review  Outcome: Ongoing, Progressing  Goal: Patient-Specific Goal (Individualized)  Outcome: Ongoing, Progressing  Goal: Absence of Hospital-Acquired Illness or Injury  Outcome: Ongoing, Progressing  Goal: Optimal Comfort and Wellbeing  Outcome: Ongoing, Progressing  Goal: Readiness for Transition of Care  Outcome: Ongoing, Progressing     Problem: Bariatric Environmental Safety  Goal: Safety Maintained with Care  Outcome: Ongoing, Progressing     Problem: Diabetes Comorbidity  Goal: Blood Glucose Level Within Targeted Range  Outcome: Ongoing, Progressing     Problem: Fluid and Electrolyte Imbalance (Acute Kidney Injury/Impairment)  Goal: Fluid and Electrolyte Balance  Outcome: Ongoing, Progressing     Problem: Oral Intake Inadequate (Acute Kidney Injury/Impairment)  Goal: Optimal Nutrition Intake  Outcome: Ongoing, Progressing     Problem: Renal Function Impairment (Acute Kidney Injury/Impairment)  Goal: Effective Renal Function  Outcome: Ongoing, Progressing     Problem: Gas Exchange Impaired  Goal: Optimal Gas Exchange  Outcome: Ongoing, Progressing     Problem: Fall Injury Risk  Goal: Absence of Fall and Fall-Related Injury  Outcome: Ongoing, Progressing     Problem: Impaired Wound Healing  Goal: Optimal Wound Healing  Outcome: Ongoing, Progressing     Problem: Restraint, Nonbehavioral (Nonviolent)  Goal: Absence of Harm or Injury  Outcome: Ongoing, Progressing     Problem: Infection  Goal: Absence of Infection Signs and Symptoms  Outcome: Ongoing, Progressing     Problem: Communication Impairment (Mechanical Ventilation, Invasive)  Goal: Effective Communication  Outcome: Ongoing, Progressing     Problem: Device-Related Complication Risk (Mechanical Ventilation, Invasive)  Goal: Optimal Device Function  Outcome: Ongoing, Progressing     Problem: Inability to Wean (Mechanical Ventilation, Invasive)  Goal: Mechanical Ventilation Liberation  Outcome:  Ongoing, Progressing     Problem: Nutrition Impairment (Mechanical Ventilation, Invasive)  Goal: Optimal Nutrition Delivery  Outcome: Ongoing, Progressing     Problem: Skin and Tissue Injury (Mechanical Ventilation, Invasive)  Goal: Absence of Device-Related Skin and Tissue Injury  Outcome: Ongoing, Progressing     Problem: Ventilator-Induced Lung Injury (Mechanical Ventilation, Invasive)  Goal: Absence of Ventilator-Induced Lung Injury  Outcome: Ongoing, Progressing     Problem: Skin Injury Risk Increased  Goal: Skin Health and Integrity  Outcome: Ongoing, Progressing     Problem: Adult Inpatient Plan of Care  Goal: Plan of Care Review  Outcome: Ongoing, Progressing  Goal: Patient-Specific Goal (Individualized)  Outcome: Ongoing, Progressing  Goal: Absence of Hospital-Acquired Illness or Injury  Outcome: Ongoing, Progressing  Goal: Optimal Comfort and Wellbeing  Outcome: Ongoing, Progressing  Goal: Readiness for Transition of Care  Outcome: Ongoing, Progressing     Problem: Bariatric Environmental Safety  Goal: Safety Maintained with Care  Outcome: Ongoing, Progressing     Problem: Diabetes Comorbidity  Goal: Blood Glucose Level Within Targeted Range  Outcome: Ongoing, Progressing     Problem: Fluid and Electrolyte Imbalance (Acute Kidney Injury/Impairment)  Goal: Fluid and Electrolyte Balance  Outcome: Ongoing, Progressing     Problem: Oral Intake Inadequate (Acute Kidney Injury/Impairment)  Goal: Optimal Nutrition Intake  Outcome: Ongoing, Progressing     Problem: Renal Function Impairment (Acute Kidney Injury/Impairment)  Goal: Effective Renal Function  Outcome: Ongoing, Progressing     Problem: Gas Exchange Impaired  Goal: Optimal Gas Exchange  Outcome: Ongoing, Progressing     Problem: Fall Injury Risk  Goal: Absence of Fall and Fall-Related Injury  Outcome: Ongoing, Progressing     Problem: Impaired Wound Healing  Goal: Optimal Wound Healing  Outcome: Ongoing, Progressing     Problem: Restraint, Nonbehavioral  (Nonviolent)  Goal: Absence of Harm or Injury  Outcome: Ongoing, Progressing     Problem: Infection  Goal: Absence of Infection Signs and Symptoms  Outcome: Ongoing, Progressing     Problem: Communication Impairment (Mechanical Ventilation, Invasive)  Goal: Effective Communication  Outcome: Ongoing, Progressing     Problem: Device-Related Complication Risk (Mechanical Ventilation, Invasive)  Goal: Optimal Device Function  Outcome: Ongoing, Progressing     Problem: Inability to Wean (Mechanical Ventilation, Invasive)  Goal: Mechanical Ventilation Liberation  Outcome: Ongoing, Progressing     Problem: Nutrition Impairment (Mechanical Ventilation, Invasive)  Goal: Optimal Nutrition Delivery  Outcome: Ongoing, Progressing     Problem: Skin and Tissue Injury (Mechanical Ventilation, Invasive)  Goal: Absence of Device-Related Skin and Tissue Injury  Outcome: Ongoing, Progressing     Problem: Ventilator-Induced Lung Injury (Mechanical Ventilation, Invasive)  Goal: Absence of Ventilator-Induced Lung Injury  Outcome: Ongoing, Progressing     Problem: Skin Injury Risk Increased  Goal: Skin Health and Integrity  Outcome: Ongoing, Progressing

## 2023-08-07 NOTE — ASSESSMENT & PLAN NOTE
Cr of 1.4 from the baseline of >1 ten months ago.   Will continue monitoring kidney function.  8/6- Cr 0.87   Stable

## 2023-08-07 NOTE — ASSESSMENT & PLAN NOTE
Acute on chronic  Patient with clinical diagnosis of heart failure on the exam with crackles in the lung and infiltrates/edema on chest xray, distended abd, edema in LE and SOB  Takes lasix 40 mg BID PO at home, will continue with lasix 40 mg BID IV here  Continue home spironolactone 25 mg   Continue Coreg with half of the home dose  Echo as below   Strict I&O  Weight daily       Left Ventricle: The left ventricle is normal in size. Normal wall thickness. Normal wall motion. There is normal systolic function with a visually estimated ejection fraction of 55 - 60%. There is normal diastolic function.    Left Atrium: Left atrium is mildly dilated.    Right Ventricle: Normal right ventricular cavity size. Wall thickness is normal. Right ventricle wall motion  is normal. Systolic function is normal.    Mitral Valve: There is trace regurgitation.    Tricuspid Valve: There is trace regurgitation.    Pulmonic Valve: There is no significant stenosis. The estimated PA systolic pressure is 34 mmHg.    IVC/SVC: Normal venous pressure at 3 mmHg.

## 2023-08-07 NOTE — PLAN OF CARE
Problem: Gas Exchange Impaired  Goal: Optimal Gas Exchange  Outcome: Ongoing, Progressing  Intervention: Optimize Oxygenation and Ventilation  Flowsheets (Taken 8/7/2023 1619)  Airway/Ventilation Management:   airway patency maintained   pulmonary hygiene promoted     Problem: Fall Injury Risk  Goal: Absence of Fall and Fall-Related Injury  Outcome: Ongoing, Progressing  Intervention: Identify and Manage Contributors  Flowsheets (Taken 8/7/2023 1619)  Medication Review/Management:   medications reviewed   high-risk medications identified

## 2023-08-07 NOTE — RESPIRATORY THERAPY
WEANING      CPAP PS 10 PEEP 5 1 HOUR TID    1) 1215 Patient was placed on cpap with above settings       1315 Patient was placed on rate to complete trial     No seen complications

## 2023-08-07 NOTE — SUBJECTIVE & OBJECTIVE
Interval History: Pt resting in bed. Remains intubated and sedated. Will Benn[place] with sedation weaned down. Does not follow commands at present. Will attempt some weaning trials.      Objective:     Vital Signs (Most Recent):  Temp: 97.3 °F (36.3 °C) (08/07/23 0600)  Pulse: 80 (08/07/23 0802)  Resp: (!) 21 (08/07/23 0749)  BP: (!) 147/69 (08/07/23 0802)  SpO2: 98 % (08/07/23 0749) Vital Signs (24h Range):  Temp:  [97.3 °F (36.3 °C)-99.3 °F (37.4 °C)] 97.3 °F (36.3 °C)  Pulse:  [74-99] 80  Resp:  [10-25] 21  SpO2:  [89 %-100 %] 98 %  BP: (114-158)/(47-82) 147/69     Weight: (!) 146.7 kg (323 lb 6.6 oz)  Body mass index is 42.67 kg/m².      Intake/Output Summary (Last 24 hours) at 8/7/2023 0928  Last data filed at 8/7/2023 0628  Gross per 24 hour   Intake 2116.51 ml   Output 3550 ml   Net -1433.49 ml        Physical Exam  Vitals and nursing note reviewed.   Constitutional:       Interventions: He is sedated and intubated.   HENT:      Head: Normocephalic.      Mouth/Throat:      Mouth: Mucous membranes are moist.   Eyes:      Conjunctiva/sclera: Conjunctivae normal.   Cardiovascular:      Rate and Rhythm: Normal rate and regular rhythm.      Pulses: Normal pulses.      Heart sounds: Murmur heard.      Systolic murmur is present with a grade of 2/6.   Pulmonary:      Effort: Pulmonary effort is normal. No respiratory distress. He is intubated.      Breath sounds: Examination of the right-lower field reveals wheezing. Examination of the left-lower field reveals wheezing. Wheezing (improved-- inspiratory) present.   Abdominal:      General: Bowel sounds are normal. There is distension.   Musculoskeletal:         General: No tenderness.      Right lower leg: Edema present.      Left lower leg: Edema present.   Skin:     General: Skin is warm.   Neurological:      Cranial Nerves: Cranial nerves 2-12 are intact.      Sensory: Sensation is intact.           Review of Systems    Vents:  Vent Mode: A/C (08/07/23 0749)  Set Rate:  21 BPM (08/07/23 0749)  Vt Set: 500 mL (08/07/23 0749)  PEEP/CPAP: 5 cmH20 (08/07/23 0749)  Oxygen Concentration (%): 40 (08/07/23 0749)  Peak Airway Pressure: 29 cmH20 (08/07/23 0749)  Total Ve: 11.1 L/m (08/07/23 0749)  F/VT Ratio<105 (RSBI): (!) 39.85 (08/07/23 0749)    Lines/Drains/Airways       Drain  Duration                  NG/OG Tube 08/05/23 1755 [place] sump Right nostril 1 day         Urethral Catheter 08/05/23 1800 Straight-tip 1 day              Airway  Duration                  Airway - Non-Surgical 08/05/23 1751 Endotracheal Tube 1 day              Peripheral Intravenous Line  Duration                  Peripheral IV - Single Lumen 08/03/23 0355 20 G Left;Posterior Hand 4 days         Peripheral IV - Single Lumen 08/05/23 1920 20 G Anterior;Distal;Right Upper Arm 1 day         Peripheral IV - Single Lumen 08/06/23 0940 20 G Anterior;Right Forearm <1 day                    Significant Labs:    CBC/Anemia Profile:  Recent Labs   Lab 08/06/23  0408 08/06/23  1123 08/07/23  0503   WBC 8.99  --  10.13   HGB 6.8* 7.5* 8.5*   HCT 27.6* 29.9* 32.2*     --  200   MCV 82.1  --  78.9*   RDW 21.3*  --  22.5*        Chemistries:  Recent Labs   Lab 08/06/23  0407 08/07/23  0503    140   K 3.9 3.6   CL 99 94*   CO2 42* 43*   BUN 11 16   CREATININE 0.87 1.05   CALCIUM 8.7 9.4   MG 1.6*  --        All pertinent labs within the past 24 hours have been reviewed.    Significant Imaging:  I have reviewed all pertinent imaging results/findings within the past 24 hours.

## 2023-08-07 NOTE — PROGRESS NOTES
[facility]  Pulmonology  Progress Note    Patient Name: Abilio Carroll  MRN: [id number]  Admission Date: 8/2/2023  Hospital Length of Stay: 4 days  Code Status: Full Code  Attending Provider: Jose Paulino MD  Primary Care Provider: Brandt Rucker MD   Principal Problem: Iron deficiency anemia    Subjective:     Interval History: Pt resting in bed. Remains intubated and sedated. Will Bennie with sedation weaned down. Does not follow commands at present. Will attempt some weaning trials.      Objective:     Vital Signs (Most Recent):  Temp: 97.3 °F (36.3 °C) (08/07/23 0600)  Pulse: 80 (08/07/23 0802)  Resp: (!) 21 (08/07/23 0749)  BP: (!) 147/69 (08/07/23 0802)  SpO2: 98 % (08/07/23 0749) Vital Signs (24h Range):  Temp:  [97.3 °F (36.3 °C)-99.3 °F (37.4 °C)] 97.3 °F (36.3 °C)  Pulse:  [74-99] 80  Resp:  [10-25] 21  SpO2:  [89 %-100 %] 98 %  BP: (114-158)/(47-82) 147/69     Weight: (!) 146.7 kg (323 lb 6.6 oz)  Body mass index is 42.67 kg/m².      Intake/Output Summary (Last 24 hours) at 8/7/2023 0928  Last data filed at 8/7/2023 0628  Gross per 24 hour   Intake 2116.51 ml   Output 3550 ml   Net -1433.49 ml        Physical Exam  Vitals and nursing note reviewed.   Constitutional:       Interventions: He is sedated and intubated.   HENT:      Head: Normocephalic.      Mouth/Throat:      Mouth: Mucous membranes are moist.   Eyes:      Conjunctiva/sclera: Conjunctivae normal.   Cardiovascular:      Rate and Rhythm: Normal rate and regular rhythm.      Pulses: Normal pulses.      Heart sounds: Murmur heard.      Systolic murmur is present with a grade of 2/6.   Pulmonary:      Effort: Pulmonary effort is normal. No respiratory distress. He is intubated.      Breath sounds: Examination of the right-lower field reveals wheezing. Examination of the left-lower field reveals wheezing. Wheezing (improved-- inspiratory) present.   Abdominal:      General: Bowel sounds are normal. There is distension.    Musculoskeletal:         General: No tenderness.      Right lower leg: Edema present.      Left lower leg: Edema present.   Skin:     General: Skin is warm.   Neurological:      Cranial Nerves: Cranial nerves 2-12 are intact.      Sensory: Sensation is intact.           Review of Systems    Vents:  Vent Mode: A/C (08/07/23 0749)  Set Rate: 21 BPM (08/07/23 0749)  Vt Set: 500 mL (08/07/23 0749)  PEEP/CPAP: 5 cmH20 (08/07/23 0749)  Oxygen Concentration (%): 40 (08/07/23 0749)  Peak Airway Pressure: 29 cmH20 (08/07/23 0749)  Total Ve: 11.1 L/m (08/07/23 0749)  F/VT Ratio<105 (RSBI): (!) 39.85 (08/07/23 0749)    Lines/Drains/Airways       Drain  Duration                  NG/OG Tube 08/05/23 1755 [place] sump Right nostril 1 day         Urethral Catheter 08/05/23 1800 Straight-tip 1 day              Airway  Duration                  Airway - Non-Surgical 08/05/23 1751 Endotracheal Tube 1 day              Peripheral Intravenous Line  Duration                  Peripheral IV - Single Lumen 08/03/23 0355 20 G Left;Posterior Hand 4 days         Peripheral IV - Single Lumen 08/05/23 1920 20 G Anterior;Distal;Right Upper Arm 1 day         Peripheral IV - Single Lumen 08/06/23 0940 20 G Anterior;Right Forearm <1 day                    Significant Labs:    CBC/Anemia Profile:  Recent Labs   Lab 08/06/23  0408 08/06/23  1123 08/07/23  0503   WBC 8.99  --  10.13   HGB 6.8* 7.5* 8.5*   HCT 27.6* 29.9* 32.2*     --  200   MCV 82.1  --  78.9*   RDW 21.3*  --  22.5*        Chemistries:  Recent Labs   Lab 08/06/23  0407 08/07/23  0503    140   K 3.9 3.6   CL 99 94*   CO2 42* 43*   BUN 11 16   CREATININE 0.87 1.05   CALCIUM 8.7 9.4   MG 1.6*  --        All pertinent labs within the past 24 hours have been reviewed.    Significant Imaging:  I have reviewed all pertinent imaging results/findings within the past 24 hours.    Assessment/Plan:     Psychiatric  Alcohol abuse  Ciwa score of 1 but patient had his last drink around 4  hours ago.   He drinks 6-12 drinks per night   Will start him on librium 25 mg Q6H  Lorazepam 1 mg once PRN for withdrawal or seizure  IV thiamine, folate and PO multivitamin   Eyes were yellow but liver enzymes were normal  Will do hepatitis panel  Will do liver US    8/4: Continue current management    8/5: Decreased to librium 10 mg Q6H  8/6- continue to wean off librium; MVI, folic acid, thiamine     Pulmonary  Acute on chronic respiratory failure  - has some compliance issues with bipap at baseline  - respiratory distress with hypercapnia yesterday requiring intubation  - duonebs, ICS, systemic steroids, abx   - continue lasix with extra dose today  - repeat CXR in AM  - rest on vent today   -- will start weaning trials today     COPD (chronic obstructive pulmonary disease)    Duoneb Q6 and breathing treatment  Oxygen as needed    8/5: Patient endorses decrease SOB. Duoneb Q6 and breathing treatment    8/6- respiratory failure overnight requiring intubation   continue duonebs, add ICS and systemic steroids, abx   Repeat CXR in AM   8/7- CXR with increased infiltrates; continue current tx     Cardiac/Vascular  Acute on chronic heart failure  Acute on chronic  Patient with clinical diagnosis of heart failure on the exam with crackles in the lung and infiltrates/edema on chest xray, distended abd, edema in LE and SOB  Takes lasix 40 mg BID PO at home, will continue with lasix 40 mg BID IV here  Continue home spironolactone 25 mg   Continue Coreg with half of the home dose  Echo as below   Strict I&O  Weight daily       Left Ventricle: The left ventricle is normal in size. Normal wall thickness. Normal wall motion. There is normal systolic function with a visually estimated ejection fraction of 55 - 60%. There is normal diastolic function.    Left Atrium: Left atrium is mildly dilated.    Right Ventricle: Normal right ventricular cavity size. Wall thickness is normal. Right ventricle wall motion  is normal.  Systolic function is normal.    Mitral Valve: There is trace regurgitation.    Tricuspid Valve: There is trace regurgitation.    Pulmonic Valve: There is no significant stenosis. The estimated PA systolic pressure is 34 mmHg.    IVC/SVC: Normal venous pressure at 3 mmHg.           Renal/  KAREN (acute kidney injury)  Cr of 1.4 from the baseline of >1 ten months ago.   Will continue monitoring kidney function.  8/6- Cr 0.87   Stable     Oncology  * Iron deficiency anemia  - continue IV iron     Acute blood loss anemia    Presented with H/H of 5.3/22 with MCV in 70s  Reports have been seeing bright red blood in stool and dark black tarry stools at times  FOBT pending  Protonix loading and then 40 mg BID  2 units of pRBC ordered and pending  GI consulted  NPO    8/3: GI evaluation today.   8/6: s/p EGD continue protonix BID  Counts stable     Endocrine  Uncontrolled type 2 diabetes mellitus with hyperglycemia  - continue SSI   - monitor BG trend and adjust as needed                  JOHNNY [facility]za[facility]  Pulmonology  [facility]

## 2023-08-07 NOTE — ASSESSMENT & PLAN NOTE
- has some compliance issues with bipap at baseline  - respiratory distress with hypercapnia yesterday requiring intubation  - duonebs, ICS, systemic steroids, abx   - continue lasix with extra dose today  - repeat CXR in AM  - rest on vent today   -- will start weaning trials today

## 2023-08-08 LAB
ANION GAP SERPL CALCULATED.3IONS-SCNC: 11 MMOL/L (ref 7–16)
ANISOCYTOSIS BLD QL SMEAR: NORMAL
BASOPHILS # BLD AUTO: 0.01 K/UL (ref 0–0.2)
BASOPHILS NFR BLD AUTO: 0.1 % (ref 0–1)
BUN SERPL-MCNC: 23 MG/DL (ref 7–18)
BUN/CREAT SERPL: 20 (ref 6–20)
CALCIUM SERPL-MCNC: 9.6 MG/DL (ref 8.5–10.1)
CHLORIDE SERPL-SCNC: 92 MMOL/L (ref 98–107)
CO2 SERPL-SCNC: 41 MMOL/L (ref 21–32)
CREAT SERPL-MCNC: 1.15 MG/DL (ref 0.7–1.3)
DIFFERENTIAL METHOD BLD: ABNORMAL
EGFR (NO RACE VARIABLE) (RUSH/TITUS): 73 ML/MIN/1.73M2
EOSINOPHIL # BLD AUTO: 0 K/UL (ref 0–0.5)
EOSINOPHIL NFR BLD AUTO: 0 % (ref 1–4)
ERYTHROCYTE [DISTWIDTH] IN BLOOD BY AUTOMATED COUNT: 25 % (ref 11.5–14.5)
GLUCOSE SERPL-MCNC: 107 MG/DL (ref 70–105)
GLUCOSE SERPL-MCNC: 140 MG/DL (ref 70–105)
GLUCOSE SERPL-MCNC: 146 MG/DL (ref 70–105)
GLUCOSE SERPL-MCNC: 148 MG/DL (ref 70–105)
GLUCOSE SERPL-MCNC: 152 MG/DL (ref 74–106)
HCT VFR BLD AUTO: 36.4 % (ref 40–54)
HGB BLD-MCNC: 9.7 G/DL (ref 13.5–18)
HYPOCHROMIA BLD QL SMEAR: NORMAL
IMM GRANULOCYTES # BLD AUTO: 0.07 K/UL (ref 0–0.04)
IMM GRANULOCYTES NFR BLD: 0.4 % (ref 0–0.4)
LYMPHOCYTES # BLD AUTO: 0.5 K/UL (ref 1–4.8)
LYMPHOCYTES NFR BLD AUTO: 3.1 % (ref 27–41)
MCH RBC QN AUTO: 21.3 PG (ref 27–31)
MCHC RBC AUTO-ENTMCNC: 26.6 G/DL (ref 32–36)
MCV RBC AUTO: 79.8 FL (ref 80–96)
MICROCYTES BLD QL SMEAR: NORMAL
MONOCYTES # BLD AUTO: 0.87 K/UL (ref 0–0.8)
MONOCYTES NFR BLD AUTO: 5.4 % (ref 2–6)
MPC BLD CALC-MCNC: 10 FL (ref 9.4–12.4)
NEUTROPHILS # BLD AUTO: 14.74 K/UL (ref 1.8–7.7)
NEUTROPHILS NFR BLD AUTO: 91 % (ref 53–65)
NRBC # BLD AUTO: 0.03 X10E3/UL
NRBC, AUTO (.00): 0.2 %
OVALOCYTES BLD QL SMEAR: NORMAL
PLATELET # BLD AUTO: 242 K/UL (ref 150–400)
PLATELET MORPHOLOGY: NORMAL
POLYCHROMASIA BLD QL SMEAR: NORMAL
POTASSIUM SERPL-SCNC: 3.6 MMOL/L (ref 3.5–5.1)
RBC # BLD AUTO: 4.56 M/UL (ref 4.6–6.2)
SODIUM SERPL-SCNC: 140 MMOL/L (ref 136–145)
TARGETS BLD QL SMEAR: NORMAL
WBC # BLD AUTO: 16.19 K/UL (ref 4.5–11)

## 2023-08-08 PROCEDURE — 99291 CRITICAL CARE FIRST HOUR: CPT | Mod: ,,, | Performed by: INTERNAL MEDICINE

## 2023-08-08 PROCEDURE — 82962 GLUCOSE BLOOD TEST: CPT

## 2023-08-08 PROCEDURE — 25000242 PHARM REV CODE 250 ALT 637 W/ HCPCS: Performed by: NURSE PRACTITIONER

## 2023-08-08 PROCEDURE — 63600175 PHARM REV CODE 636 W HCPCS: Performed by: NURSE PRACTITIONER

## 2023-08-08 PROCEDURE — 27000221 HC OXYGEN, UP TO 24 HOURS

## 2023-08-08 PROCEDURE — 25000003 PHARM REV CODE 250: Performed by: HOSPITALIST

## 2023-08-08 PROCEDURE — 85025 COMPLETE CBC W/AUTO DIFF WBC: CPT

## 2023-08-08 PROCEDURE — 25000003 PHARM REV CODE 250: Performed by: NURSE PRACTITIONER

## 2023-08-08 PROCEDURE — C9113 INJ PANTOPRAZOLE SODIUM, VIA: HCPCS | Performed by: NURSE PRACTITIONER

## 2023-08-08 PROCEDURE — 94003 VENT MGMT INPAT SUBQ DAY: CPT

## 2023-08-08 PROCEDURE — 94761 N-INVAS EAR/PLS OXIMETRY MLT: CPT

## 2023-08-08 PROCEDURE — 20000000 HC ICU ROOM

## 2023-08-08 PROCEDURE — 99291 PR CRITICAL CARE, E/M 30-74 MINUTES: ICD-10-PCS | Mod: ,,, | Performed by: INTERNAL MEDICINE

## 2023-08-08 PROCEDURE — 99900035 HC TECH TIME PER 15 MIN (STAT)

## 2023-08-08 PROCEDURE — 25000003 PHARM REV CODE 250

## 2023-08-08 PROCEDURE — 63600175 PHARM REV CODE 636 W HCPCS: Performed by: HOSPITALIST

## 2023-08-08 PROCEDURE — 25000242 PHARM REV CODE 250 ALT 637 W/ HCPCS

## 2023-08-08 PROCEDURE — 80048 BASIC METABOLIC PNL TOTAL CA: CPT

## 2023-08-08 PROCEDURE — 63600175 PHARM REV CODE 636 W HCPCS

## 2023-08-08 PROCEDURE — 36600 WITHDRAWAL OF ARTERIAL BLOOD: CPT

## 2023-08-08 PROCEDURE — 94640 AIRWAY INHALATION TREATMENT: CPT

## 2023-08-08 PROCEDURE — 99900026 HC AIRWAY MAINTENANCE (STAT)

## 2023-08-08 RX ORDER — METHYLPREDNISOLONE SOD SUCC 125 MG
40 VIAL (EA) INJECTION DAILY
Status: COMPLETED | OUTPATIENT
Start: 2023-08-09 | End: 2023-08-10

## 2023-08-08 RX ORDER — FUROSEMIDE 10 MG/ML
40 INJECTION INTRAMUSCULAR; INTRAVENOUS DAILY
Status: DISCONTINUED | OUTPATIENT
Start: 2023-08-09 | End: 2023-08-13

## 2023-08-08 RX ADMIN — FUROSEMIDE 40 MG: 10 INJECTION, SOLUTION INTRAVENOUS at 02:08

## 2023-08-08 RX ADMIN — PROPOFOL 45 MCG/KG/MIN: 10 INJECTION, EMULSION INTRAVENOUS at 09:08

## 2023-08-08 RX ADMIN — METHYLPREDNISOLONE SODIUM SUCCINATE 40 MG: 125 INJECTION, POWDER, FOR SOLUTION INTRAMUSCULAR; INTRAVENOUS at 10:08

## 2023-08-08 RX ADMIN — PROPOFOL 45 MCG/KG/MIN: 10 INJECTION, EMULSION INTRAVENOUS at 02:08

## 2023-08-08 RX ADMIN — THERA TABS 1 TABLET: TAB at 08:08

## 2023-08-08 RX ADMIN — IPRATROPIUM BROMIDE AND ALBUTEROL SULFATE 3 ML: 2.5; .5 SOLUTION RESPIRATORY (INHALATION) at 12:08

## 2023-08-08 RX ADMIN — PROPOFOL 45 MCG/KG/MIN: 10 INJECTION, EMULSION INTRAVENOUS at 05:08

## 2023-08-08 RX ADMIN — MUPIROCIN: 20 OINTMENT TOPICAL at 09:08

## 2023-08-08 RX ADMIN — CARVEDILOL 6.25 MG: 6.25 TABLET, FILM COATED ORAL at 05:08

## 2023-08-08 RX ADMIN — PROPOFOL 45 MCG/KG/MIN: 10 INJECTION, EMULSION INTRAVENOUS at 08:08

## 2023-08-08 RX ADMIN — AZITHROMYCIN DIHYDRATE 500 MG: 500 INJECTION, POWDER, LYOPHILIZED, FOR SOLUTION INTRAVENOUS at 10:08

## 2023-08-08 RX ADMIN — BUDESONIDE 0.25 MG: 0.25 SUSPENSION RESPIRATORY (INHALATION) at 07:08

## 2023-08-08 RX ADMIN — FOLIC ACID 1 MG: 1 TABLET ORAL at 08:08

## 2023-08-08 RX ADMIN — PROPOFOL 20 MCG/KG/MIN: 10 INJECTION, EMULSION INTRAVENOUS at 11:08

## 2023-08-08 RX ADMIN — COLCHICINE 0.6 MG: 0.6 TABLET ORAL at 08:08

## 2023-08-08 RX ADMIN — CARVEDILOL 6.25 MG: 6.25 TABLET, FILM COATED ORAL at 08:08

## 2023-08-08 RX ADMIN — PANTOPRAZOLE SODIUM 40 MG: 40 INJECTION, POWDER, FOR SOLUTION INTRAVENOUS at 09:08

## 2023-08-08 RX ADMIN — PIPERACILLIN AND TAZOBACTAM 4.5 G: 4; .5 INJECTION, POWDER, FOR SOLUTION INTRAVENOUS; PARENTERAL at 02:08

## 2023-08-08 RX ADMIN — Medication 100 MG: at 08:08

## 2023-08-08 RX ADMIN — LABETALOL HYDROCHLORIDE 10 MG: 5 INJECTION INTRAVENOUS at 04:08

## 2023-08-08 RX ADMIN — PIPERACILLIN AND TAZOBACTAM 4.5 G: 4; .5 INJECTION, POWDER, FOR SOLUTION INTRAVENOUS; PARENTERAL at 10:08

## 2023-08-08 RX ADMIN — FENTANYL CITRATE 25 MCG/HR: 50 INJECTION, SOLUTION INTRAMUSCULAR; INTRAVENOUS at 04:08

## 2023-08-08 RX ADMIN — ALLOPURINOL 300 MG: 300 TABLET ORAL at 08:08

## 2023-08-08 RX ADMIN — IPRATROPIUM BROMIDE AND ALBUTEROL SULFATE 3 ML: 2.5; .5 SOLUTION RESPIRATORY (INHALATION) at 07:08

## 2023-08-08 RX ADMIN — PANTOPRAZOLE SODIUM 40 MG: 40 INJECTION, POWDER, FOR SOLUTION INTRAVENOUS at 08:08

## 2023-08-08 RX ADMIN — PIPERACILLIN AND TAZOBACTAM 4.5 G: 4; .5 INJECTION, POWDER, FOR SOLUTION INTRAVENOUS; PARENTERAL at 06:08

## 2023-08-08 RX ADMIN — ATORVASTATIN CALCIUM 40 MG: 40 TABLET, FILM COATED ORAL at 09:08

## 2023-08-08 RX ADMIN — SODIUM CHLORIDE 125 MG: 9 INJECTION, SOLUTION INTRAVENOUS at 12:08

## 2023-08-08 RX ADMIN — AMLODIPINE BESYLATE 10 MG: 10 TABLET ORAL at 08:08

## 2023-08-08 RX ADMIN — PROPOFOL 45 MCG/KG/MIN: 10 INJECTION, EMULSION INTRAVENOUS at 03:08

## 2023-08-08 RX ADMIN — Medication 1000 UNITS: at 08:08

## 2023-08-08 RX ADMIN — PROPOFOL 45 MCG/KG/MIN: 10 INJECTION, EMULSION INTRAVENOUS at 07:08

## 2023-08-08 NOTE — PLAN OF CARE
Problem: Adult Inpatient Plan of Care  Goal: Plan of Care Review  Outcome: Ongoing, Progressing  Goal: Patient-Specific Goal (Individualized)  Outcome: Ongoing, Progressing  Goal: Absence of Hospital-Acquired Illness or Injury  Outcome: Ongoing, Progressing  Goal: Optimal Comfort and Wellbeing  Outcome: Ongoing, Progressing  Goal: Readiness for Transition of Care  Outcome: Ongoing, Progressing     Problem: Bariatric Environmental Safety  Goal: Safety Maintained with Care  Outcome: Ongoing, Progressing     Problem: Diabetes Comorbidity  Goal: Blood Glucose Level Within Targeted Range  Outcome: Ongoing, Progressing     Problem: Fluid and Electrolyte Imbalance (Acute Kidney Injury/Impairment)  Goal: Fluid and Electrolyte Balance  Outcome: Ongoing, Progressing     Problem: Oral Intake Inadequate (Acute Kidney Injury/Impairment)  Goal: Optimal Nutrition Intake  Outcome: Ongoing, Progressing     Problem: Renal Function Impairment (Acute Kidney Injury/Impairment)  Goal: Effective Renal Function  Outcome: Ongoing, Progressing     Problem: Gas Exchange Impaired  Goal: Optimal Gas Exchange  Outcome: Ongoing, Progressing     Problem: Fall Injury Risk  Goal: Absence of Fall and Fall-Related Injury  Outcome: Ongoing, Progressing     Problem: Impaired Wound Healing  Goal: Optimal Wound Healing  Outcome: Ongoing, Progressing     Problem: Restraint, Nonbehavioral (Nonviolent)  Goal: Absence of Harm or Injury  Outcome: Ongoing, Progressing     Problem: Infection  Goal: Absence of Infection Signs and Symptoms  Outcome: Ongoing, Progressing     Problem: Communication Impairment (Mechanical Ventilation, Invasive)  Goal: Effective Communication  Outcome: Ongoing, Progressing     Problem: Device-Related Complication Risk (Mechanical Ventilation, Invasive)  Goal: Optimal Device Function  Outcome: Ongoing, Progressing     Problem: Inability to Wean (Mechanical Ventilation, Invasive)  Goal: Mechanical Ventilation Liberation  Outcome:  Ongoing, Progressing     Problem: Nutrition Impairment (Mechanical Ventilation, Invasive)  Goal: Optimal Nutrition Delivery  Outcome: Ongoing, Progressing     Problem: Skin and Tissue Injury (Mechanical Ventilation, Invasive)  Goal: Absence of Device-Related Skin and Tissue Injury  Outcome: Ongoing, Progressing     Problem: Ventilator-Induced Lung Injury (Mechanical Ventilation, Invasive)  Goal: Absence of Ventilator-Induced Lung Injury  Outcome: Ongoing, Progressing     Problem: Skin Injury Risk Increased  Goal: Skin Health and Integrity  Outcome: Ongoing, Progressing

## 2023-08-08 NOTE — RESPIRATORY THERAPY
WEANING    CPAP PS 10 PEEP 5 FIO2 40% 1 HOUR TID    1) 0820 Patient placed on cpap with above settings       1145 Patient placed back on AC rate by nurse.    1000 ABGS drawn on cpap     No seen complications during the trial.

## 2023-08-08 NOTE — ASSESSMENT & PLAN NOTE
respiratory failure overnight requiring intubation on 08/06   continue duonebs, ICS and systemic steroids, abx

## 2023-08-08 NOTE — ASSESSMENT & PLAN NOTE
Acute on chronic  Takes lasix 40 mg BID PO at home, Strict I&O  Weight daily     ECHO --     Left Ventricle: The left ventricle is normal in size. Normal wall thickness. Normal wall motion. There is normal systolic function with a visually estimated ejection fraction of 55 - 60%. There is normal diastolic function.    Left Atrium: Left atrium is mildly dilated.    Right Ventricle: Normal right ventricular cavity size. Wall thickness is normal. Right ventricle wall motion  is normal. Systolic function is normal.    Mitral Valve: There is trace regurgitation.    Tricuspid Valve: There is trace regurgitation.    Pulmonic Valve: There is no significant stenosis. The estimated PA systolic pressure is 34 mmHg.    IVC/SVC: Normal venous pressure at 3 mmHg.

## 2023-08-08 NOTE — ASSESSMENT & PLAN NOTE
- has some compliance issues with bipap at baseline  - respiratory distress with hypercapnia  requiring intubation 08/06   - duonebs, ICS, systemic steroids, abx   - increased CO2 of 92 on cpap today --? Could be related to sedation - will decrease and repeat ABGs this afternoon

## 2023-08-08 NOTE — ASSESSMENT & PLAN NOTE
Cr of 1.4 from the baseline of >1 ten months ago.   Will continue monitoring kidney function.  8/8- slow trend up - Cr 1.15  - will decrease IV lasix

## 2023-08-08 NOTE — SUBJECTIVE & OBJECTIVE
Interval History: intubated and sedated, on CPAP with CO2 in the 90s .  Plan to decrease sedation and repeat ABGs this afternoon       Objective:     Vital Signs (Most Recent):  Temp: 96.8 °F (36 °C) (08/08/23 1031)  Pulse: 77 (08/08/23 1031)  Resp: 18 (08/08/23 1031)  BP: (!) 153/81 (08/08/23 1031)  SpO2: 100 % (08/08/23 1031) Vital Signs (24h Range):  Temp:  [96.8 °F (36 °C)-98.2 °F (36.8 °C)] 96.8 °F (36 °C)  Pulse:  [68-89] 77  Resp:  [10-35] 18  SpO2:  [85 %-100 %] 100 %  BP: (125-189)/(57-96) 153/81     Weight: (!) 147.3 kg (324 lb 11.8 oz)  Body mass index is 42.84 kg/m².      Intake/Output Summary (Last 24 hours) at 8/8/2023 1116  Last data filed at 8/8/2023 1000  Gross per 24 hour   Intake 1576.2 ml   Output 1850 ml   Net -273.8 ml        Physical Exam  Vitals and nursing note reviewed.   Constitutional:       Appearance: He is morbidly obese.      Interventions: He is sedated and intubated.   HENT:      Head: Normocephalic.      Right Ear: External ear normal.      Left Ear: External ear normal.      Nose: Nose normal.      Mouth/Throat:      Mouth: Mucous membranes are moist.   Eyes:      Conjunctiva/sclera: Conjunctivae normal.   Cardiovascular:      Rate and Rhythm: Normal rate and regular rhythm.      Pulses: Normal pulses.   Pulmonary:      Effort: Pulmonary effort is normal. No respiratory distress. He is intubated.      Breath sounds: Wheezes: improved-- inspiratory.   Abdominal:      General: Bowel sounds are normal.   Skin:     General: Skin is warm.      Capillary Refill: Capillary refill takes less than 2 seconds.   Neurological:      Cranial Nerves: Cranial nerves 2-12 are intact.      Sensory: Sensation is intact.           Review of Systems    Vents:  Vent Mode: A/C (08/08/23 0750)  Set Rate: 21 BPM (08/08/23 0750)  Vt Set: 500 mL (08/08/23 0750)  Pressure Support: 10 cmH20 (08/07/23 1942)  PEEP/CPAP: 5 cmH20 (08/08/23 0750)  Oxygen Concentration (%): 50 (08/08/23 0750)  Peak Airway Pressure:  32 cmH20 (08/08/23 0750)  Total Ve: 10.9 L/m (08/08/23 0750)  F/VT Ratio<105 (RSBI): (!) 43.22 (08/08/23 0750)    Lines/Drains/Airways       Drain  Duration                  NG/OG Tube 08/05/23 1755 [place] sump Right nostril 2 days         Urethral Catheter 08/05/23 1800 Straight-tip 2 days              Airway  Duration                  Airway - Non-Surgical 08/05/23 1751 Endotracheal Tube 2 days              Peripheral Intravenous Line  Duration                  Peripheral IV - Single Lumen 08/03/23 0355 20 G Left;Posterior Hand 5 days         Peripheral IV - Single Lumen 08/05/23 1920 20 G Anterior;Distal;Right Upper Arm 2 days         Peripheral IV - Single Lumen 08/06/23 0940 20 G Anterior;Right Forearm 2 days                    Significant Labs:    CBC/Anemia Profile:  Recent Labs   Lab 08/06/23  1123 08/07/23  0503 08/08/23  0332   WBC  --  10.13 16.19*   HGB 7.5* 8.5* 9.7*   HCT 29.9* 32.2* 36.4*   PLT  --  200 242   MCV  --  78.9* 79.8*   RDW  --  22.5* 25.0*        Chemistries:  Recent Labs   Lab 08/07/23  0503 08/08/23  0332    140   K 3.6 3.6   CL 94* 92*   CO2 43* 41*   BUN 16 23*   CREATININE 1.05 1.15   CALCIUM 9.4 9.6       All pertinent labs within the past 24 hours have been reviewed.    Significant Imaging:  I have reviewed all pertinent imaging results/findings within the past 24 hours.

## 2023-08-08 NOTE — PROGRESS NOTES
[facility]  Pulmonology  Progress Note    Patient Name: Abilio Carroll  MRN: [id number]  Admission Date: 8/2/2023  Hospital Length of Stay: 5 days  Code Status: Full Code  Attending Provider: Jose Paulino MD  Primary Care Provider: Brandt Rucker MD   Principal Problem: Iron deficiency anemia    Subjective:     Interval History: intubated and sedated, on CPAP with CO2 in the 90s .  Plan to decrease sedation and repeat ABGs this afternoon       Objective:     Vital Signs (Most Recent):  Temp: 96.8 °F (36 °C) (08/08/23 1031)  Pulse: 77 (08/08/23 1031)  Resp: 18 (08/08/23 1031)  BP: (!) 153/81 (08/08/23 1031)  SpO2: 100 % (08/08/23 1031) Vital Signs (24h Range):  Temp:  [96.8 °F (36 °C)-98.2 °F (36.8 °C)] 96.8 °F (36 °C)  Pulse:  [68-89] 77  Resp:  [10-35] 18  SpO2:  [85 %-100 %] 100 %  BP: (125-189)/(57-96) 153/81     Weight: (!) 147.3 kg (324 lb 11.8 oz)  Body mass index is 42.84 kg/m².      Intake/Output Summary (Last 24 hours) at 8/8/2023 1116  Last data filed at 8/8/2023 1000  Gross per 24 hour   Intake 1576.2 ml   Output 1850 ml   Net -273.8 ml        Physical Exam  Vitals and nursing note reviewed.   Constitutional:       Appearance: He is morbidly obese.      Interventions: He is sedated and intubated.   HENT:      Head: Normocephalic.      Right Ear: External ear normal.      Left Ear: External ear normal.      Nose: Nose normal.      Mouth/Throat:      Mouth: Mucous membranes are moist.   Eyes:      Conjunctiva/sclera: Conjunctivae normal.   Cardiovascular:      Rate and Rhythm: Normal rate and regular rhythm.      Pulses: Normal pulses.   Pulmonary:      Effort: Pulmonary effort is normal. No respiratory distress. He is intubated.      Breath sounds: Wheezes: improved-- inspiratory.   Abdominal:      General: Bowel sounds are normal.   Skin:     General: Skin is warm.      Capillary Refill: Capillary refill takes less than 2 seconds.   Neurological:      Cranial Nerves: Cranial  nerves 2-12 are intact.      Sensory: Sensation is intact.           Review of Systems    Vents:  Vent Mode: A/C (08/08/23 0750)  Set Rate: 21 BPM (08/08/23 0750)  Vt Set: 500 mL (08/08/23 0750)  Pressure Support: 10 cmH20 (08/07/23 1942)  PEEP/CPAP: 5 cmH20 (08/08/23 0750)  Oxygen Concentration (%): 50 (08/08/23 0750)  Peak Airway Pressure: 32 cmH20 (08/08/23 0750)  Total Ve: 10.9 L/m (08/08/23 0750)  F/VT Ratio<105 (RSBI): (!) 43.22 (08/08/23 0750)    Lines/Drains/Airways       Drain  Duration                  NG/OG Tube 08/05/23 1755 [place] sump Right nostril 2 days         Urethral Catheter 08/05/23 1800 Straight-tip 2 days              Airway  Duration                  Airway - Non-Surgical 08/05/23 1751 Endotracheal Tube 2 days              Peripheral Intravenous Line  Duration                  Peripheral IV - Single Lumen 08/03/23 0355 20 G Left;Posterior Hand 5 days         Peripheral IV - Single Lumen 08/05/23 1920 20 G Anterior;Distal;Right Upper Arm 2 days         Peripheral IV - Single Lumen 08/06/23 0940 20 G Anterior;Right Forearm 2 days                    Significant Labs:    CBC/Anemia Profile:  Recent Labs   Lab 08/06/23  1123 08/07/23  0503 08/08/23  0332   WBC  --  10.13 16.19*   HGB 7.5* 8.5* 9.7*   HCT 29.9* 32.2* 36.4*   PLT  --  200 242   MCV  --  78.9* 79.8*   RDW  --  22.5* 25.0*        Chemistries:  Recent Labs   Lab 08/07/23  0503 08/08/23  0332    140   K 3.6 3.6   CL 94* 92*   CO2 43* 41*   BUN 16 23*   CREATININE 1.05 1.15   CALCIUM 9.4 9.6       All pertinent labs within the past 24 hours have been reviewed.    Significant Imaging:  I have reviewed all pertinent imaging results/findings within the past 24 hours.    Assessment/Plan:     Psychiatric  Alcohol abuse  Last drink -- 08/02--   He drinks 6-12 drinks per night   IV thiamine, folate and PO multivitamin   Currently intubated -- weaning sedation     Pulmonary  Acute on chronic respiratory failure  - has some compliance issues  with bipap at baseline  - respiratory distress with hypercapnia  requiring intubation 08/06   - duonebs, ICS, systemic steroids, abx   - increased CO2 of 92 on cpap today --? Could be related to sedation - will decrease and repeat ABGs this afternoon     COPD (chronic obstructive pulmonary disease)  respiratory failure overnight requiring intubation on 08/06   continue duonebs, ICS and systemic steroids, abx         Cardiac/Vascular  Acute on chronic heart failure  Acute on chronic  Takes lasix 40 mg BID PO at home, Strict I&O  Weight daily     ECHO --     Left Ventricle: The left ventricle is normal in size. Normal wall thickness. Normal wall motion. There is normal systolic function with a visually estimated ejection fraction of 55 - 60%. There is normal diastolic function.    Left Atrium: Left atrium is mildly dilated.    Right Ventricle: Normal right ventricular cavity size. Wall thickness is normal. Right ventricle wall motion  is normal. Systolic function is normal.    Mitral Valve: There is trace regurgitation.    Tricuspid Valve: There is trace regurgitation.    Pulmonic Valve: There is no significant stenosis. The estimated PA systolic pressure is 34 mmHg.    IVC/SVC: Normal venous pressure at 3 mmHg.           Essential hypertension  Stable     Renal/  KAREN (acute kidney injury)  Cr of 1.4 from the baseline of >1 ten months ago.   Will continue monitoring kidney function.  8/8- slow trend up - Cr 1.15  - will decrease IV lasix       Oncology  * Iron deficiency anemia  - continue  iron     Acute blood loss anemia    Presented with H/H of 5.3/22 with MCV in 70s-- transfused 2 units PRBCs   Reports have been seeing bright red blood in stool and dark black tarry stools at times  GI consulted and now s/p EDG -- Protonix 40 mg BID  H/H now stable  9.7/36.4     Endocrine  Severe obesity (BMI >= 40)  Complicates all levels of care     Uncontrolled type 2 diabetes mellitus with hyperglycemia  - continue SSI    - monitor BG trend and adjust as needed   -  Fasting 136    Orthopedic  Gout  Home medications continued     Other  Sleep apnea  Currently on vent                  MARYCARMEN Norton-ACNP  Pulmonology  [facility]

## 2023-08-08 NOTE — ASSESSMENT & PLAN NOTE
Last drink -- 08/02--   He drinks 6-12 drinks per night   IV thiamine, folate and PO multivitamin   Currently intubated -- weaning sedation

## 2023-08-08 NOTE — PLAN OF CARE
Problem: Gas Exchange Impaired  Goal: Optimal Gas Exchange  Outcome: Ongoing, Progressing  Intervention: Optimize Oxygenation and Ventilation  Flowsheets (Taken 8/8/2023 1808)  Airway/Ventilation Management:   airway patency maintained   calming measures promoted   pulmonary hygiene promoted     Problem: Fall Injury Risk  Goal: Absence of Fall and Fall-Related Injury  Outcome: Ongoing, Progressing  Intervention: Identify and Manage Contributors  Flowsheets (Taken 8/8/2023 1808)  Medication Review/Management:   medications reviewed   high-risk medications identified

## 2023-08-08 NOTE — ASSESSMENT & PLAN NOTE
Presented with H/H of 5.3/22 with MCV in 70s-- transfused 2 units PRBCs   Reports have been seeing bright red blood in stool and dark black tarry stools at times  GI consulted and now s/p EDG -- Protonix 40 mg BID  H/H now stable  9.7/36.4

## 2023-08-09 LAB
ANION GAP SERPL CALCULATED.3IONS-SCNC: 8 MMOL/L (ref 7–16)
ANISOCYTOSIS BLD QL SMEAR: ABNORMAL
BASOPHILS # BLD AUTO: 0.04 K/UL (ref 0–0.2)
BASOPHILS NFR BLD AUTO: 0.3 % (ref 0–1)
BUN SERPL-MCNC: 23 MG/DL (ref 7–18)
BUN/CREAT SERPL: 21 (ref 6–20)
CALCIUM SERPL-MCNC: 9.9 MG/DL (ref 8.5–10.1)
CHLORIDE SERPL-SCNC: 97 MMOL/L (ref 98–107)
CO2 SERPL-SCNC: 38 MMOL/L (ref 21–32)
CREAT SERPL-MCNC: 1.11 MG/DL (ref 0.7–1.3)
DACRYOCYTES BLD QL SMEAR: ABNORMAL
DIFFERENTIAL METHOD BLD: ABNORMAL
EGFR (NO RACE VARIABLE) (RUSH/TITUS): 76 ML/MIN/1.73M2
EOSINOPHIL # BLD AUTO: 0.04 K/UL (ref 0–0.5)
EOSINOPHIL NFR BLD AUTO: 0.3 % (ref 1–4)
ERYTHROCYTE [DISTWIDTH] IN BLOOD BY AUTOMATED COUNT: 26.8 % (ref 11.5–14.5)
GLUCOSE SERPL-MCNC: 324 MG/DL (ref 70–105)
GLUCOSE SERPL-MCNC: 93 MG/DL (ref 70–105)
GLUCOSE SERPL-MCNC: 98 MG/DL (ref 70–105)
GLUCOSE SERPL-MCNC: 98 MG/DL (ref 74–106)
HCT VFR BLD AUTO: 38.2 % (ref 40–54)
HGB BLD-MCNC: 10.2 G/DL (ref 13.5–18)
HYPOCHROMIA BLD QL SMEAR: ABNORMAL
IMM GRANULOCYTES # BLD AUTO: 0.04 K/UL (ref 0–0.04)
IMM GRANULOCYTES NFR BLD: 0.3 % (ref 0–0.4)
LYMPHOCYTES # BLD AUTO: 1.69 K/UL (ref 1–4.8)
LYMPHOCYTES NFR BLD AUTO: 11.7 % (ref 27–41)
MCH RBC QN AUTO: 21.8 PG (ref 27–31)
MCHC RBC AUTO-ENTMCNC: 26.7 G/DL (ref 32–36)
MCV RBC AUTO: 81.6 FL (ref 80–96)
MONOCYTES # BLD AUTO: 1.63 K/UL (ref 0–0.8)
MONOCYTES NFR BLD AUTO: 11.3 % (ref 2–6)
MPC BLD CALC-MCNC: 11 FL (ref 9.4–12.4)
NEUTROPHILS # BLD AUTO: 11.01 K/UL (ref 1.8–7.7)
NEUTROPHILS NFR BLD AUTO: 76.1 % (ref 53–65)
NRBC # BLD AUTO: 0.02 X10E3/UL
NRBC, AUTO (.00): 0.1 %
OVALOCYTES BLD QL SMEAR: ABNORMAL
PLATELET # BLD AUTO: 259 K/UL (ref 150–400)
PLATELET MORPHOLOGY: ABNORMAL
POLYCHROMASIA BLD QL SMEAR: ABNORMAL
POTASSIUM SERPL-SCNC: 3.1 MMOL/L (ref 3.5–5.1)
RBC # BLD AUTO: 4.68 M/UL (ref 4.6–6.2)
SODIUM SERPL-SCNC: 140 MMOL/L (ref 136–145)
STOMATOCYTES BLD QL SMEAR: ABNORMAL
WBC # BLD AUTO: 14.45 K/UL (ref 4.5–11)

## 2023-08-09 PROCEDURE — 25000003 PHARM REV CODE 250: Performed by: NURSE PRACTITIONER

## 2023-08-09 PROCEDURE — 25000242 PHARM REV CODE 250 ALT 637 W/ HCPCS

## 2023-08-09 PROCEDURE — 25000003 PHARM REV CODE 250: Performed by: HOSPITALIST

## 2023-08-09 PROCEDURE — 36600 WITHDRAWAL OF ARTERIAL BLOOD: CPT

## 2023-08-09 PROCEDURE — 99900035 HC TECH TIME PER 15 MIN (STAT)

## 2023-08-09 PROCEDURE — 94640 AIRWAY INHALATION TREATMENT: CPT

## 2023-08-09 PROCEDURE — 63600175 PHARM REV CODE 636 W HCPCS: Performed by: NURSE PRACTITIONER

## 2023-08-09 PROCEDURE — 80048 BASIC METABOLIC PNL TOTAL CA: CPT

## 2023-08-09 PROCEDURE — 63600175 PHARM REV CODE 636 W HCPCS: Performed by: INTERNAL MEDICINE

## 2023-08-09 PROCEDURE — 94003 VENT MGMT INPAT SUBQ DAY: CPT

## 2023-08-09 PROCEDURE — 27000221 HC OXYGEN, UP TO 24 HOURS

## 2023-08-09 PROCEDURE — 94660 CPAP INITIATION&MGMT: CPT | Mod: XB

## 2023-08-09 PROCEDURE — 99900026 HC AIRWAY MAINTENANCE (STAT)

## 2023-08-09 PROCEDURE — 94761 N-INVAS EAR/PLS OXIMETRY MLT: CPT

## 2023-08-09 PROCEDURE — 82962 GLUCOSE BLOOD TEST: CPT

## 2023-08-09 PROCEDURE — C9113 INJ PANTOPRAZOLE SODIUM, VIA: HCPCS | Performed by: NURSE PRACTITIONER

## 2023-08-09 PROCEDURE — 20000000 HC ICU ROOM

## 2023-08-09 PROCEDURE — 85025 COMPLETE CBC W/AUTO DIFF WBC: CPT

## 2023-08-09 PROCEDURE — 25000242 PHARM REV CODE 250 ALT 637 W/ HCPCS: Performed by: NURSE PRACTITIONER

## 2023-08-09 PROCEDURE — 99233 PR SUBSEQUENT HOSPITAL CARE,LEVL III: ICD-10-PCS | Mod: ,,, | Performed by: INTERNAL MEDICINE

## 2023-08-09 PROCEDURE — 99233 SBSQ HOSP IP/OBS HIGH 50: CPT | Mod: ,,, | Performed by: INTERNAL MEDICINE

## 2023-08-09 RX ORDER — SCOLOPAMINE TRANSDERMAL SYSTEM 1 MG/1
1 PATCH, EXTENDED RELEASE TRANSDERMAL
Status: DISCONTINUED | OUTPATIENT
Start: 2023-08-09 | End: 2023-08-14 | Stop reason: HOSPADM

## 2023-08-09 RX ADMIN — IPRATROPIUM BROMIDE AND ALBUTEROL SULFATE 3 ML: 2.5; .5 SOLUTION RESPIRATORY (INHALATION) at 01:08

## 2023-08-09 RX ADMIN — CARVEDILOL 6.25 MG: 6.25 TABLET, FILM COATED ORAL at 05:08

## 2023-08-09 RX ADMIN — IPRATROPIUM BROMIDE AND ALBUTEROL SULFATE 3 ML: 2.5; .5 SOLUTION RESPIRATORY (INHALATION) at 07:08

## 2023-08-09 RX ADMIN — PIPERACILLIN AND TAZOBACTAM 4.5 G: 4; .5 INJECTION, POWDER, FOR SOLUTION INTRAVENOUS; PARENTERAL at 07:08

## 2023-08-09 RX ADMIN — AZITHROMYCIN DIHYDRATE 500 MG: 500 INJECTION, POWDER, LYOPHILIZED, FOR SOLUTION INTRAVENOUS at 11:08

## 2023-08-09 RX ADMIN — FENTANYL CITRATE 12.5 MCG/HR: 50 INJECTION, SOLUTION INTRAMUSCULAR; INTRAVENOUS at 07:08

## 2023-08-09 RX ADMIN — FUROSEMIDE 40 MG: 10 INJECTION, SOLUTION INTRAMUSCULAR; INTRAVENOUS at 09:08

## 2023-08-09 RX ADMIN — PIPERACILLIN AND TAZOBACTAM 4.5 G: 4; .5 INJECTION, POWDER, FOR SOLUTION INTRAVENOUS; PARENTERAL at 10:08

## 2023-08-09 RX ADMIN — PROPOFOL 50 MCG/KG/MIN: 10 INJECTION, EMULSION INTRAVENOUS at 02:08

## 2023-08-09 RX ADMIN — IPRATROPIUM BROMIDE AND ALBUTEROL SULFATE 3 ML: 2.5; .5 SOLUTION RESPIRATORY (INHALATION) at 12:08

## 2023-08-09 RX ADMIN — ATORVASTATIN CALCIUM 40 MG: 40 TABLET, FILM COATED ORAL at 09:08

## 2023-08-09 RX ADMIN — BUDESONIDE 0.25 MG: 0.25 SUSPENSION RESPIRATORY (INHALATION) at 07:08

## 2023-08-09 RX ADMIN — Medication 1000 UNITS: at 09:08

## 2023-08-09 RX ADMIN — AMLODIPINE BESYLATE 10 MG: 10 TABLET ORAL at 09:08

## 2023-08-09 RX ADMIN — PROPOFOL 50 MCG/KG/MIN: 10 INJECTION, EMULSION INTRAVENOUS at 05:08

## 2023-08-09 RX ADMIN — CARVEDILOL 6.25 MG: 6.25 TABLET, FILM COATED ORAL at 09:08

## 2023-08-09 RX ADMIN — MUPIROCIN: 20 OINTMENT TOPICAL at 09:08

## 2023-08-09 RX ADMIN — THERA TABS 1 TABLET: TAB at 09:08

## 2023-08-09 RX ADMIN — SCOPOLAMINE 1 PATCH: 1 SYSTEM TRANSDERMAL at 12:08

## 2023-08-09 RX ADMIN — PIPERACILLIN AND TAZOBACTAM 4.5 G: 4; .5 INJECTION, POWDER, FOR SOLUTION INTRAVENOUS; PARENTERAL at 02:08

## 2023-08-09 RX ADMIN — PROPOFOL 30 MCG/KG/MIN: 10 INJECTION, EMULSION INTRAVENOUS at 12:08

## 2023-08-09 RX ADMIN — PROPOFOL 40 MCG/KG/MIN: 10 INJECTION, EMULSION INTRAVENOUS at 07:08

## 2023-08-09 RX ADMIN — COLCHICINE 0.6 MG: 0.6 TABLET ORAL at 09:08

## 2023-08-09 RX ADMIN — INSULIN ASPART 8 UNITS: 100 INJECTION, SOLUTION INTRAVENOUS; SUBCUTANEOUS at 05:08

## 2023-08-09 RX ADMIN — ALLOPURINOL 300 MG: 300 TABLET ORAL at 09:08

## 2023-08-09 RX ADMIN — Medication 100 MG: at 09:08

## 2023-08-09 RX ADMIN — PANTOPRAZOLE SODIUM 40 MG: 40 INJECTION, POWDER, FOR SOLUTION INTRAVENOUS at 10:08

## 2023-08-09 RX ADMIN — FOLIC ACID 1 MG: 1 TABLET ORAL at 09:08

## 2023-08-09 RX ADMIN — METHYLPREDNISOLONE SODIUM SUCCINATE 40 MG: 125 INJECTION, POWDER, FOR SOLUTION INTRAMUSCULAR; INTRAVENOUS at 09:08

## 2023-08-09 RX ADMIN — PANTOPRAZOLE SODIUM 40 MG: 40 INJECTION, POWDER, FOR SOLUTION INTRAVENOUS at 09:08

## 2023-08-09 NOTE — NURSING
Katherine NP in room. Fentanyl infusion stopped at this time per NP. Neli @ 15mcg per NP. Will adjust flowsheet. RASS goal will not be met due to weaning for extuabtion.

## 2023-08-09 NOTE — ASSESSMENT & PLAN NOTE
Presented with H/H of 5.3/22 with MCV in 70s-- transfused 2 units PRBCs   Reports have been seeing bright red blood in stool and dark black tarry stools at times  GI consulted and now s/p EDG -- Protonix 40 mg BID  H/H now stable  10.2/38.2

## 2023-08-09 NOTE — ASSESSMENT & PLAN NOTE
Acute on chronic  Takes lasix 40 mg BID PO at home, Strict I&O  Weight daily     ECHO --     Left Ventricle: The left ventricle is normal in size. Normal wall thickness. Normal wall motion. There is normal systolic function with a visually estimated ejection fraction of 55 - 60%. There is normal diastolic function.    Left Atrium: Left atrium is mildly dilated.    Right Ventricle: Normal right ventricular cavity size. Wall thickness is normal. Right ventricle wall motion  is normal. Systolic function is normal.    Mitral Valve: There is trace regurgitation.    Tricuspid Valve: There is trace regurgitation.    Pulmonic Valve: There is no significant stenosis. The estimated PA systolic pressure is 34 mmHg.    IVC/SVC: Normal venous pressure at 3 mmHg.     Wt stable, UOP 2577 cc in last 24 hours

## 2023-08-09 NOTE — PLAN OF CARE
Problem: Adult Inpatient Plan of Care  Goal: Plan of Care Review  8/8/2023 1921 by Brett Morgan RN  Outcome: Ongoing, Progressing  8/8/2023 0526 by Brett Morgan RN  Outcome: Ongoing, Progressing  Goal: Patient-Specific Goal (Individualized)  8/8/2023 1921 by Brett Morgan RN  Outcome: Ongoing, Progressing  8/8/2023 0526 by Brett Morgan RN  Outcome: Ongoing, Progressing  Goal: Absence of Hospital-Acquired Illness or Injury  8/8/2023 1921 by Brett Morgan RN  Outcome: Ongoing, Progressing  8/8/2023 0526 by Brett Morgan RN  Outcome: Ongoing, Progressing  Goal: Optimal Comfort and Wellbeing  8/8/2023 1921 by Brett Morgan RN  Outcome: Ongoing, Progressing  8/8/2023 0526 by Brett Morgan RN  Outcome: Ongoing, Progressing  Goal: Readiness for Transition of Care  8/8/2023 1921 by Brett Morgan RN  Outcome: Ongoing, Progressing  8/8/2023 0526 by Brett Morgan RN  Outcome: Ongoing, Progressing     Problem: Bariatric Environmental Safety  Goal: Safety Maintained with Care  8/8/2023 1921 by Brett Morgan RN  Outcome: Ongoing, Progressing  8/8/2023 0526 by Brett Morgan RN  Outcome: Ongoing, Progressing     Problem: Diabetes Comorbidity  Goal: Blood Glucose Level Within Targeted Range  8/8/2023 1921 by Brett Morgan RN  Outcome: Ongoing, Progressing  8/8/2023 0526 by Brett Morgan RN  Outcome: Ongoing, Progressing     Problem: Fluid and Electrolyte Imbalance (Acute Kidney Injury/Impairment)  Goal: Fluid and Electrolyte Balance  8/8/2023 1921 by Brett Morgan RN  Outcome: Ongoing, Progressing  8/8/2023 0526 by Brett Morgan RN  Outcome: Ongoing, Progressing     Problem: Oral Intake Inadequate (Acute Kidney Injury/Impairment)  Goal: Optimal Nutrition Intake  8/8/2023 1921 by Brett Morgan RN  Outcome: Ongoing, Progressing  8/8/2023 0526 by Brett Morgan, RN  Outcome: Ongoing, Progressing     Problem: Renal Function Impairment (Acute  Kidney Injury/Impairment)  Goal: Effective Renal Function  8/8/2023 1921 by Brett Morgan RN  Outcome: Ongoing, Progressing  8/8/2023 0526 by Brett Morgan RN  Outcome: Ongoing, Progressing     Problem: Gas Exchange Impaired  Goal: Optimal Gas Exchange  8/8/2023 1921 by Brett Morgan RN  Outcome: Ongoing, Progressing  8/8/2023 0526 by Brett Morgan RN  Outcome: Ongoing, Progressing     Problem: Fall Injury Risk  Goal: Absence of Fall and Fall-Related Injury  8/8/2023 1921 by Brett Morgan RN  Outcome: Ongoing, Progressing  8/8/2023 0526 by Brett Morgan RN  Outcome: Ongoing, Progressing     Problem: Impaired Wound Healing  Goal: Optimal Wound Healing  8/8/2023 1921 by Brett Morgan RN  Outcome: Ongoing, Progressing  8/8/2023 0526 by Brett Morgan RN  Outcome: Ongoing, Progressing     Problem: Restraint, Nonbehavioral (Nonviolent)  Goal: Absence of Harm or Injury  8/8/2023 1921 by Brett Morgan RN  Outcome: Ongoing, Progressing  8/8/2023 0526 by Brett Morgan RN  Outcome: Ongoing, Progressing     Problem: Infection  Goal: Absence of Infection Signs and Symptoms  8/8/2023 1921 by Brett Morgan RN  Outcome: Ongoing, Progressing  8/8/2023 0526 by Brett Morgan RN  Outcome: Ongoing, Progressing     Problem: Communication Impairment (Mechanical Ventilation, Invasive)  Goal: Effective Communication  8/8/2023 1921 by Brett Morgan RN  Outcome: Ongoing, Progressing  8/8/2023 0526 by Brett Morgan RN  Outcome: Ongoing, Progressing     Problem: Device-Related Complication Risk (Mechanical Ventilation, Invasive)  Goal: Optimal Device Function  8/8/2023 1921 by Brett Morgan RN  Outcome: Ongoing, Progressing  8/8/2023 0526 by Brett Morgan RN  Outcome: Ongoing, Progressing     Problem: Inability to Wean (Mechanical Ventilation, Invasive)  Goal: Mechanical Ventilation Liberation  8/8/2023 1921 by Eddie, Brett, RN  Outcome: Ongoing,  Progressing  8/8/2023 0526 by Brett Morgan RN  Outcome: Ongoing, Progressing     Problem: Nutrition Impairment (Mechanical Ventilation, Invasive)  Goal: Optimal Nutrition Delivery  8/8/2023 1921 by Brett Morgan RN  Outcome: Ongoing, Progressing  8/8/2023 0526 by Brett Morgan RN  Outcome: Ongoing, Progressing     Problem: Skin and Tissue Injury (Mechanical Ventilation, Invasive)  Goal: Absence of Device-Related Skin and Tissue Injury  8/8/2023 1921 by Brett Morgan RN  Outcome: Ongoing, Progressing  8/8/2023 0526 by Brett Morgan RN  Outcome: Ongoing, Progressing     Problem: Ventilator-Induced Lung Injury (Mechanical Ventilation, Invasive)  Goal: Absence of Ventilator-Induced Lung Injury  8/8/2023 1921 by Brett Morgan RN  Outcome: Ongoing, Progressing  8/8/2023 0526 by Brett Morgan RN  Outcome: Ongoing, Progressing     Problem: Skin Injury Risk Increased  Goal: Skin Health and Integrity  8/8/2023 1921 by Brett Morgan RN  Outcome: Ongoing, Progressing  8/8/2023 0526 by Brett Morgan RN  Outcome: Ongoing, Progressing

## 2023-08-09 NOTE — ASSESSMENT & PLAN NOTE
respiratory failure overnight requiring intubation on 08/06   continue duonebs, ICS, wean systemic steroids, abx for 5 days

## 2023-08-09 NOTE — RESPIRATORY THERAPY
PLACED ON CPAP @ 08:00 - 10:40 WITH 5 PEEP AND 10 PRESSURE SUPPORT , EXTUBATED TO BIPAP 12 / 5 , RATE 15 AND 40% WITH NO COMPLICATIONS .

## 2023-08-09 NOTE — ASSESSMENT & PLAN NOTE
Last drink -- 08/02--   He drinks 6-12 drinks per night   IV thiamine, folate and PO multivitamin      Should be out of window for DTs

## 2023-08-09 NOTE — NURSING
End of shift note: Patients sedation weaned to appropriate RASS to extubate(1020AM). Abram,CRT at bedside with successful extubation. Pt placed on BIPAP 40%. Pt with copious secretions. Scopolamine patch applied. Pt requiring frequent suctioning. Pt is alert and oriented. Bragg remains intact with good urine output. PIV intact and secured

## 2023-08-09 NOTE — SUBJECTIVE & OBJECTIVE
Interval History:  placed on SBT and sedation stopped. He will wake up, pulling good tidal volumes.     Objective:     Vital Signs (Most Recent):  Temp: 98.4 °F (36.9 °C) (08/09/23 0715)  Pulse: 76 (08/09/23 0743)  Resp: (!) 21 (08/09/23 0743)  BP: (!) 162/83 (08/09/23 0715)  SpO2: 97 % (08/09/23 0743) Vital Signs (24h Range):  Temp:  [96.8 °F (36 °C)-98.8 °F (37.1 °C)] 98.4 °F (36.9 °C)  Pulse:  [67-89] 76  Resp:  [10-37] 21  SpO2:  [86 %-100 %] 97 %  BP: (111-185)/() 162/83     Weight: (!) 146.2 kg (322 lb 5 oz)  Body mass index is 42.52 kg/m².      Intake/Output Summary (Last 24 hours) at 8/9/2023 1036  Last data filed at 8/9/2023 0601  Gross per 24 hour   Intake 1413.46 ml   Output 2113 ml   Net -699.54 ml        Physical Exam  Vitals and nursing note reviewed.   Constitutional:       Appearance: He is morbidly obese.      Interventions: He is sedated and intubated.   HENT:      Head: Normocephalic.      Right Ear: External ear normal.      Left Ear: External ear normal.      Nose: Nose normal.      Mouth/Throat:      Mouth: Mucous membranes are moist.   Eyes:      Conjunctiva/sclera: Conjunctivae normal.   Cardiovascular:      Rate and Rhythm: Normal rate and regular rhythm.      Pulses: Normal pulses.   Pulmonary:      Effort: Pulmonary effort is normal. No respiratory distress. He is intubated.      Breath sounds: Wheezes: improved-- inspiratory.   Abdominal:      General: Bowel sounds are normal.   Skin:     General: Skin is warm.      Capillary Refill: Capillary refill takes less than 2 seconds.   Neurological:      Mental Status: He is alert.      Cranial Nerves: Cranial nerves 2-12 are intact.      Sensory: Sensation is intact.           Review of Systems    Vents:  Vent Mode: CPAP PSV (08/09/23 0743)  Set Rate: 0 BPM (08/09/23 0743)  Vt Set: 0 mL (08/09/23 0743)  Pressure Support: 10 cmH20 (08/09/23 0743)  PEEP/CPAP: 5 cmH20 (08/09/23 0743)  Oxygen Concentration (%): 40 (08/09/23 0743)  Peak Airway  Pressure: 15 cmH20 (08/09/23 0743)  Total Ve: 4.3 L/m (08/09/23 0743)  F/VT Ratio<105 (RSBI): (!) 68.63 (08/09/23 0743)    Lines/Drains/Airways       Drain  Duration                  NG/OG Tube 08/05/23 1755 [place] sump Right nostril 3 days         Urethral Catheter 08/05/23 1800 Straight-tip 3 days              Airway  Duration                  Airway - Non-Surgical 08/05/23 1751 Endotracheal Tube 3 days              Peripheral Intravenous Line  Duration                  Peripheral IV - Single Lumen 08/05/23 1920 20 G Anterior;Distal;Right Upper Arm 3 days         Peripheral IV - Single Lumen 08/06/23 0940 20 G Anterior;Right Forearm 3 days                    Significant Labs:    CBC/Anemia Profile:  Recent Labs   Lab 08/08/23 0332 08/09/23  0635   WBC 16.19* 14.45*   HGB 9.7* 10.2*   HCT 36.4* 38.2*    259   MCV 79.8* 81.6   RDW 25.0* 26.8*        Chemistries:  Recent Labs   Lab 08/08/23 0332 08/09/23  0635    140   K 3.6 3.1*   CL 92* 97*   CO2 41* 38*   BUN 23* 23*   CREATININE 1.15 1.11   CALCIUM 9.6 9.9       All pertinent labs within the past 24 hours have been reviewed.    Significant Imaging:  I have reviewed all pertinent imaging results/findings within the past 24 hours.

## 2023-08-09 NOTE — ASSESSMENT & PLAN NOTE
Cr of 1.4 from the baseline of >1 ten months ago.   Will continue monitoring kidney function.  8/8- slow trend up - Cr 1.15  - will decrease IV lasix   8/9 -  Cr 1.11 - UOP 2577cc in last 24 hours

## 2023-08-09 NOTE — PROGRESS NOTES
[facility]  Pulmonology  Progress Note    Patient Name: Abilio Carroll  MRN: [id number]  Admission Date: 8/2/2023  Hospital Length of Stay: 6 days  Code Status: Full Code  Attending Provider: Jose Paulino MD  Primary Care Provider: Brandt Rucker MD   Principal Problem: Iron deficiency anemia    Subjective:     Interval History:  placed on SBT and sedation stopped. He will wake up, pulling good tidal volumes.     Objective:     Vital Signs (Most Recent):  Temp: 98.4 °F (36.9 °C) (08/09/23 0715)  Pulse: 76 (08/09/23 0743)  Resp: (!) 21 (08/09/23 0743)  BP: (!) 162/83 (08/09/23 0715)  SpO2: 97 % (08/09/23 0743) Vital Signs (24h Range):  Temp:  [96.8 °F (36 °C)-98.8 °F (37.1 °C)] 98.4 °F (36.9 °C)  Pulse:  [67-89] 76  Resp:  [10-37] 21  SpO2:  [86 %-100 %] 97 %  BP: (111-185)/() 162/83     Weight: (!) 146.2 kg (322 lb 5 oz)  Body mass index is 42.52 kg/m².      Intake/Output Summary (Last 24 hours) at 8/9/2023 1036  Last data filed at 8/9/2023 0601  Gross per 24 hour   Intake 1413.46 ml   Output 2113 ml   Net -699.54 ml        Physical Exam  Vitals and nursing note reviewed.   Constitutional:       Appearance: He is morbidly obese.      Interventions: He is sedated and intubated.   HENT:      Head: Normocephalic.      Right Ear: External ear normal.      Left Ear: External ear normal.      Nose: Nose normal.      Mouth/Throat:      Mouth: Mucous membranes are moist.   Eyes:      Conjunctiva/sclera: Conjunctivae normal.   Cardiovascular:      Rate and Rhythm: Normal rate and regular rhythm.      Pulses: Normal pulses.   Pulmonary:      Effort: Pulmonary effort is normal. No respiratory distress. He is intubated.      Breath sounds: Wheezes: improved-- inspiratory.   Abdominal:      General: Bowel sounds are normal.   Skin:     General: Skin is warm.      Capillary Refill: Capillary refill takes less than 2 seconds.   Neurological:      Mental Status: He is alert.      Cranial Nerves:  Cranial nerves 2-12 are intact.      Sensory: Sensation is intact.           Review of Systems    Vents:  Vent Mode: CPAP PSV (08/09/23 0743)  Set Rate: 0 BPM (08/09/23 0743)  Vt Set: 0 mL (08/09/23 0743)  Pressure Support: 10 cmH20 (08/09/23 0743)  PEEP/CPAP: 5 cmH20 (08/09/23 0743)  Oxygen Concentration (%): 40 (08/09/23 0743)  Peak Airway Pressure: 15 cmH20 (08/09/23 0743)  Total Ve: 4.3 L/m (08/09/23 0743)  F/VT Ratio<105 (RSBI): (!) 68.63 (08/09/23 0743)    Lines/Drains/Airways       Drain  Duration                  NG/OG Tube 08/05/23 1755 [place] sump Right nostril 3 days         Urethral Catheter 08/05/23 1800 Straight-tip 3 days              Airway  Duration                  Airway - Non-Surgical 08/05/23 1751 Endotracheal Tube 3 days              Peripheral Intravenous Line  Duration                  Peripheral IV - Single Lumen 08/05/23 1920 20 G Anterior;Distal;Right Upper Arm 3 days         Peripheral IV - Single Lumen 08/06/23 0940 20 G Anterior;Right Forearm 3 days                    Significant Labs:    CBC/Anemia Profile:  Recent Labs   Lab 08/08/23 0332 08/09/23 0635   WBC 16.19* 14.45*   HGB 9.7* 10.2*   HCT 36.4* 38.2*    259   MCV 79.8* 81.6   RDW 25.0* 26.8*        Chemistries:  Recent Labs   Lab 08/08/23 0332 08/09/23  0635    140   K 3.6 3.1*   CL 92* 97*   CO2 41* 38*   BUN 23* 23*   CREATININE 1.15 1.11   CALCIUM 9.6 9.9       All pertinent labs within the past 24 hours have been reviewed.    Significant Imaging:  I have reviewed all pertinent imaging results/findings within the past 24 hours.    Assessment/Plan:     Psychiatric  Alcohol abuse  Last drink -- 08/02--   He drinks 6-12 drinks per night   IV thiamine, folate and PO multivitamin      Should be out of window for DTs     Pulmonary  Acute on chronic respiratory failure  - has some compliance issues with bipap at baseline  - respiratory distress with hypercapnia  requiring intubation 08/06   - duonebs, ICS, systemic  steroids, 5 days abx     8/9- placed on SBT this moring sn did well. Will extubate to Bipap for 24 hours       COPD (chronic obstructive pulmonary disease)  respiratory failure overnight requiring intubation on 08/06   continue duonebs, ICS, wean systemic steroids, abx for 5 days         Cardiac/Vascular  Acute on chronic heart failure  Acute on chronic  Takes lasix 40 mg BID PO at home, Strict I&O  Weight daily     ECHO --     Left Ventricle: The left ventricle is normal in size. Normal wall thickness. Normal wall motion. There is normal systolic function with a visually estimated ejection fraction of 55 - 60%. There is normal diastolic function.    Left Atrium: Left atrium is mildly dilated.    Right Ventricle: Normal right ventricular cavity size. Wall thickness is normal. Right ventricle wall motion  is normal. Systolic function is normal.    Mitral Valve: There is trace regurgitation.    Tricuspid Valve: There is trace regurgitation.    Pulmonic Valve: There is no significant stenosis. The estimated PA systolic pressure is 34 mmHg.    IVC/SVC: Normal venous pressure at 3 mmHg.     Wt stable, UOP 2577 cc in last 24 hours       Essential hypertension  Stable     Renal/  KAREN (acute kidney injury)  Cr of 1.4 from the baseline of >1 ten months ago.   Will continue monitoring kidney function.  8/8- slow trend up - Cr 1.15  - will decrease IV lasix   8/9 -  Cr 1.11 - UOP 2577cc in last 24 hours     Oncology  Acute blood loss anemia    Presented with H/H of 5.3/22 with MCV in 70s-- transfused 2 units PRBCs   Reports have been seeing bright red blood in stool and dark black tarry stools at times  GI consulted and now s/p EDG -- Protonix 40 mg BID  H/H now stable  10.2/38.2    Endocrine  Severe obesity (BMI >= 40)  Complicates all levels of care     Uncontrolled type 2 diabetes mellitus with hyperglycemia  - continue SSI   - monitor BG trend and adjust as needed   -  Fasting 98    Orthopedic  Gout  Home  medications continued     Other  Sleep apnea  bipap qHS                   Neida Valencia, AG-ACNP  Pulmonology  [facility]

## 2023-08-09 NOTE — ASSESSMENT & PLAN NOTE
- has some compliance issues with bipap at baseline  - respiratory distress with hypercapnia  requiring intubation 08/06   - duonebs, ICS, systemic steroids, 5 days abx     8/9- placed on SBT this moring sn did well. Will extubate to Bipap for 24 hours

## 2023-08-10 LAB
ANION GAP SERPL CALCULATED.3IONS-SCNC: 7 MMOL/L (ref 7–16)
ANISOCYTOSIS BLD QL SMEAR: ABNORMAL
BASOPHILS # BLD AUTO: 0.02 K/UL (ref 0–0.2)
BASOPHILS NFR BLD AUTO: 0.2 % (ref 0–1)
BUN SERPL-MCNC: 24 MG/DL (ref 7–18)
BUN/CREAT SERPL: 27 (ref 6–20)
CALCIUM SERPL-MCNC: 9.8 MG/DL (ref 8.5–10.1)
CHLORIDE SERPL-SCNC: 98 MMOL/L (ref 98–107)
CO2 SERPL-SCNC: 40 MMOL/L (ref 21–32)
CREAT SERPL-MCNC: 0.88 MG/DL (ref 0.7–1.3)
DIFFERENTIAL METHOD BLD: ABNORMAL
EGFR (NO RACE VARIABLE) (RUSH/TITUS): 98 ML/MIN/1.73M2
EOSINOPHIL # BLD AUTO: 0.02 K/UL (ref 0–0.5)
EOSINOPHIL NFR BLD AUTO: 0.2 % (ref 1–4)
ERYTHROCYTE [DISTWIDTH] IN BLOOD BY AUTOMATED COUNT: 26.3 % (ref 11.5–14.5)
GLUCOSE SERPL-MCNC: 115 MG/DL (ref 70–105)
GLUCOSE SERPL-MCNC: 165 MG/DL (ref 70–105)
GLUCOSE SERPL-MCNC: 72 MG/DL (ref 70–105)
GLUCOSE SERPL-MCNC: 82 MG/DL (ref 74–106)
GLUCOSE SERPL-MCNC: 83 MG/DL (ref 70–105)
HCT VFR BLD AUTO: 36.8 % (ref 40–54)
HGB BLD-MCNC: 9.5 G/DL (ref 13.5–18)
HYPOCHROMIA BLD QL SMEAR: ABNORMAL
IMM GRANULOCYTES # BLD AUTO: 0.05 K/UL (ref 0–0.04)
IMM GRANULOCYTES NFR BLD: 0.4 % (ref 0–0.4)
LYMPHOCYTES # BLD AUTO: 1.31 K/UL (ref 1–4.8)
LYMPHOCYTES NFR BLD AUTO: 10.2 % (ref 27–41)
MCH RBC QN AUTO: 21.7 PG (ref 27–31)
MCHC RBC AUTO-ENTMCNC: 25.8 G/DL (ref 32–36)
MCV RBC AUTO: 84 FL (ref 80–96)
MONOCYTES # BLD AUTO: 1.44 K/UL (ref 0–0.8)
MONOCYTES NFR BLD AUTO: 11.2 % (ref 2–6)
MPC BLD CALC-MCNC: 11 FL (ref 9.4–12.4)
NEUTROPHILS # BLD AUTO: 10.03 K/UL (ref 1.8–7.7)
NEUTROPHILS NFR BLD AUTO: 77.8 % (ref 53–65)
NRBC # BLD AUTO: 0 X10E3/UL
NRBC, AUTO (.00): 0 %
OVALOCYTES BLD QL SMEAR: ABNORMAL
PLATELET # BLD AUTO: 261 K/UL (ref 150–400)
PLATELET MORPHOLOGY: ABNORMAL
POLYCHROMASIA BLD QL SMEAR: ABNORMAL
POTASSIUM SERPL-SCNC: 3.3 MMOL/L (ref 3.5–5.1)
RBC # BLD AUTO: 4.38 M/UL (ref 4.6–6.2)
SODIUM SERPL-SCNC: 142 MMOL/L (ref 136–145)
STOMATOCYTES BLD QL SMEAR: ABNORMAL
WBC # BLD AUTO: 12.87 K/UL (ref 4.5–11)

## 2023-08-10 PROCEDURE — 99900035 HC TECH TIME PER 15 MIN (STAT)

## 2023-08-10 PROCEDURE — 82962 GLUCOSE BLOOD TEST: CPT

## 2023-08-10 PROCEDURE — 25000242 PHARM REV CODE 250 ALT 637 W/ HCPCS: Performed by: NURSE PRACTITIONER

## 2023-08-10 PROCEDURE — 63600175 PHARM REV CODE 636 W HCPCS: Performed by: INTERNAL MEDICINE

## 2023-08-10 PROCEDURE — 99233 PR SUBSEQUENT HOSPITAL CARE,LEVL III: ICD-10-PCS | Mod: ,,, | Performed by: INTERNAL MEDICINE

## 2023-08-10 PROCEDURE — 94640 AIRWAY INHALATION TREATMENT: CPT

## 2023-08-10 PROCEDURE — 63600175 PHARM REV CODE 636 W HCPCS: Performed by: NURSE PRACTITIONER

## 2023-08-10 PROCEDURE — 85025 COMPLETE CBC W/AUTO DIFF WBC: CPT

## 2023-08-10 PROCEDURE — 80048 BASIC METABOLIC PNL TOTAL CA: CPT

## 2023-08-10 PROCEDURE — 99233 SBSQ HOSP IP/OBS HIGH 50: CPT | Mod: ,,, | Performed by: INTERNAL MEDICINE

## 2023-08-10 PROCEDURE — 25000003 PHARM REV CODE 250: Performed by: NURSE PRACTITIONER

## 2023-08-10 PROCEDURE — 20000000 HC ICU ROOM

## 2023-08-10 PROCEDURE — C9113 INJ PANTOPRAZOLE SODIUM, VIA: HCPCS | Performed by: NURSE PRACTITIONER

## 2023-08-10 PROCEDURE — 94667 MNPJ CHEST WALL 1ST: CPT

## 2023-08-10 PROCEDURE — 94660 CPAP INITIATION&MGMT: CPT

## 2023-08-10 PROCEDURE — 97162 PT EVAL MOD COMPLEX 30 MIN: CPT

## 2023-08-10 PROCEDURE — 25000242 PHARM REV CODE 250 ALT 637 W/ HCPCS

## 2023-08-10 PROCEDURE — 92610 EVALUATE SWALLOWING FUNCTION: CPT

## 2023-08-10 PROCEDURE — 25000003 PHARM REV CODE 250: Performed by: HOSPITALIST

## 2023-08-10 PROCEDURE — 94761 N-INVAS EAR/PLS OXIMETRY MLT: CPT

## 2023-08-10 PROCEDURE — 97165 OT EVAL LOW COMPLEX 30 MIN: CPT

## 2023-08-10 PROCEDURE — 27000221 HC OXYGEN, UP TO 24 HOURS

## 2023-08-10 RX ORDER — GUAIFENESIN 600 MG/1
1200 TABLET, EXTENDED RELEASE ORAL 2 TIMES DAILY
Status: DISCONTINUED | OUTPATIENT
Start: 2023-08-10 | End: 2023-08-14 | Stop reason: HOSPADM

## 2023-08-10 RX ADMIN — Medication 1000 UNITS: at 08:08

## 2023-08-10 RX ADMIN — AZITHROMYCIN DIHYDRATE 500 MG: 500 INJECTION, POWDER, LYOPHILIZED, FOR SOLUTION INTRAVENOUS at 11:08

## 2023-08-10 RX ADMIN — PANTOPRAZOLE SODIUM 40 MG: 40 INJECTION, POWDER, FOR SOLUTION INTRAVENOUS at 10:08

## 2023-08-10 RX ADMIN — PIPERACILLIN AND TAZOBACTAM 4.5 G: 4; .5 INJECTION, POWDER, FOR SOLUTION INTRAVENOUS; PARENTERAL at 11:08

## 2023-08-10 RX ADMIN — PIPERACILLIN AND TAZOBACTAM 4.5 G: 4; .5 INJECTION, POWDER, FOR SOLUTION INTRAVENOUS; PARENTERAL at 04:08

## 2023-08-10 RX ADMIN — AMLODIPINE BESYLATE 10 MG: 10 TABLET ORAL at 08:08

## 2023-08-10 RX ADMIN — IPRATROPIUM BROMIDE AND ALBUTEROL SULFATE 3 ML: 2.5; .5 SOLUTION RESPIRATORY (INHALATION) at 12:08

## 2023-08-10 RX ADMIN — INSULIN ASPART 2 UNITS: 100 INJECTION, SOLUTION INTRAVENOUS; SUBCUTANEOUS at 07:08

## 2023-08-10 RX ADMIN — FUROSEMIDE 40 MG: 10 INJECTION, SOLUTION INTRAMUSCULAR; INTRAVENOUS at 08:08

## 2023-08-10 RX ADMIN — COLCHICINE 0.6 MG: 0.6 TABLET ORAL at 08:08

## 2023-08-10 RX ADMIN — FOLIC ACID 1 MG: 1 TABLET ORAL at 08:08

## 2023-08-10 RX ADMIN — PIPERACILLIN AND TAZOBACTAM 4.5 G: 4; .5 INJECTION, POWDER, FOR SOLUTION INTRAVENOUS; PARENTERAL at 06:08

## 2023-08-10 RX ADMIN — ATORVASTATIN CALCIUM 40 MG: 40 TABLET, FILM COATED ORAL at 10:08

## 2023-08-10 RX ADMIN — IPRATROPIUM BROMIDE AND ALBUTEROL SULFATE 3 ML: 2.5; .5 SOLUTION RESPIRATORY (INHALATION) at 07:08

## 2023-08-10 RX ADMIN — Medication 100 MG: at 08:08

## 2023-08-10 RX ADMIN — PANTOPRAZOLE SODIUM 40 MG: 40 INJECTION, POWDER, FOR SOLUTION INTRAVENOUS at 08:08

## 2023-08-10 RX ADMIN — THERA TABS 1 TABLET: TAB at 08:08

## 2023-08-10 RX ADMIN — BUDESONIDE 0.25 MG: 0.25 SUSPENSION RESPIRATORY (INHALATION) at 07:08

## 2023-08-10 RX ADMIN — GUAIFENESIN 1200 MG: 600 TABLET, EXTENDED RELEASE ORAL at 10:08

## 2023-08-10 RX ADMIN — MUPIROCIN: 20 OINTMENT TOPICAL at 08:08

## 2023-08-10 RX ADMIN — METHYLPREDNISOLONE SODIUM SUCCINATE 40 MG: 125 INJECTION, POWDER, FOR SOLUTION INTRAMUSCULAR; INTRAVENOUS at 08:08

## 2023-08-10 RX ADMIN — CARVEDILOL 6.25 MG: 6.25 TABLET, FILM COATED ORAL at 09:08

## 2023-08-10 RX ADMIN — GUAIFENESIN 1200 MG: 600 TABLET, EXTENDED RELEASE ORAL at 11:08

## 2023-08-10 RX ADMIN — CARVEDILOL 6.25 MG: 6.25 TABLET, FILM COATED ORAL at 04:08

## 2023-08-10 RX ADMIN — IPRATROPIUM BROMIDE AND ALBUTEROL SULFATE 3 ML: 2.5; .5 SOLUTION RESPIRATORY (INHALATION) at 01:08

## 2023-08-10 RX ADMIN — ALLOPURINOL 300 MG: 300 TABLET ORAL at 08:08

## 2023-08-10 NOTE — PLAN OF CARE
Problem: Bariatric Environmental Safety  Goal: Safety Maintained with Care  Outcome: Ongoing, Progressing     Problem: Gas Exchange Impaired  Goal: Optimal Gas Exchange  Outcome: Ongoing, Progressing     Problem: Fall Injury Risk  Goal: Absence of Fall and Fall-Related Injury  Outcome: Ongoing, Progressing     Problem: Impaired Wound Healing  Goal: Optimal Wound Healing  Outcome: Ongoing, Progressing     Problem: Skin Injury Risk Increased  Goal: Skin Health and Integrity  Outcome: Ongoing, Progressing

## 2023-08-10 NOTE — ASSESSMENT & PLAN NOTE
- has some compliance issues with bipap at baseline  - respiratory distress with hypercapnia  requiring intubation 08/06   - duonebs, ICS, systemic steroids, 5 days abx     8/10- extuabted on 08/09 to bipap - doind well on NC today - has increased secretions, will add CPT with respiratory treatments.  OOB. PT/OT

## 2023-08-10 NOTE — PLAN OF CARE
Problem: Occupational Therapy  Goal: Occupational Therapy Goal  Description: STG: (in 2 weeks)  Pt will perform grooming with setup  Pt will bathe with mod(A)  Pt will perform UE dressing with setup and min(A)  Pt will perform LE dressing with moderate (A)  Pt will sit EOB x 7 min with SBA  Pt will transfer bed/chair/bsc with min(A) with RW  Pt will perform standing task x 3 min with CGA with RW  Pt will tolerate 15 minutes of tx without fatigue      LTG: (in 4 weeks)  1.Restore to max I with self care and mobility.     Outcome: Ongoing, Progressing

## 2023-08-10 NOTE — PT/OT/SLP EVAL
Physical Therapy Evaluation    Patient Name:  Abilio Carroll   MRN:  [id number]    Recommendations:     Discharge Recommendations: outpatient PT   Discharge Equipment Recommendations: walker, rolling   Barriers to discharge: Decreased caregiver support    Assessment:     Abilio Carroll is a 62 y.o. male admitted with a medical diagnosis of Iron deficiency anemia.  He presents with the following impairments/functional limitations: weakness, impaired functional mobility, gait instability, impaired self care skills Patient with generalized weakness from bedrest while on vent. Patient needs a good bit of assistance for mobility. Able to take a few shuffled steps. Patient would benefit from swingbed prior to dc home. Will increase activity daily as able with gait and exercise.    Rehab Prognosis: Good; patient would benefit from acute skilled PT services to address these deficits and reach maximum level of function.    Recent Surgery: * No surgery found *      Plan:     During this hospitalization, patient to be seen 5 x/week to address the identified rehab impairments via gait training, therapeutic activities and progress toward the following goals:    Plan of Care Expires:  08/11/23    Subjective     Chief Complaint: generalized weakness  Patient/Family Comments/goals: Patient hasnt been oob since admission. Was extubated yesterday.  Pain/Comfort:  Pain Rating 1: 0/10    Patients cultural, spiritual, Amish conflicts given the current situation:      Living Environment:  Lives with nephew, 2 steps entering house  Prior to admission, patients level of function was independent.  Equipment used at home: oxygen, CPAP.  DME owned (not currently used): none.  Upon discharge, patient will have assistance from nephew.    Objective:     Communicated with nurse prior to session.  Patient found supine with oxygen, blood pressure cuff, pulse ox (continuous), telemetry, bird catheter  upon PT entry to room.    General  Precautions: Standard, fall  Orthopedic Precautions:    Braces:    Respiratory Status: Nasal cannula, flow 4 L/min    Exams:  Cognitive Exam:  Patient is oriented to Person, Place, and Situation  RLE ROM: WFL  RLE Strength: 4/5  LLE ROM: WFL  LLE Strength: 4/5    Functional Mobility:  Bed Mobility:     Supine to Sit: moderate assistance  Sit to Supine: moderate assistance  Transfers:     Sit to Stand:  moderate assistance with rolling walker  Toilet Transfer: moderate assistance with  rolling walker  using  Stand Pivot  Gait: ambulated 4-5 shuffled steps with rolling walker      AM-PAC 6 CLICK MOBILITY  Total Score:11       Treatment & Education:  Tx plan    Patient left HOB elevated with all lines intact.    GOALS:   Multidisciplinary Problems       Physical Therapy Goals          Problem: Physical Therapy    Goal Priority Disciplines Outcome Goal Variances Interventions   Physical Therapy Goal     PT, PT/OT Ongoing, Progressing     Description: Short Term Goals  Ambulate SBA - 100 feet with rolling walker assistive device.   Supine to sit minimum  Sit to stand minimum  SPT minimum  5. Independent with HEP    Long Term Goals  Ambulate SBA - 200 feet with rolling walker assistive device.   Supine to sit stand-by  Sit to stand stand-by  SPT stand-by  Needed equipment for home.                              History:     Past Medical History:   Diagnosis Date    Cardiomyopathy     COPD (chronic obstructive pulmonary disease)     Diabetes mellitus, type 2     Gout     Heart failure, unspecified     Hypertension     Obstructive sleep apnea on CPAP     Osteoarthritis        History reviewed. No pertinent surgical history.    Time Tracking:     PT Received On: 08/10/23  PT Start Time: 1400     PT Stop Time: 1430  PT Total Time (min): 30 min     Billable Minutes: Evaluation 30      08/10/2023

## 2023-08-10 NOTE — PROGRESS NOTES
[facility]  Pulmonology  Progress Note    Patient Name: Abilio Carroll  MRN: [id number]  Admission Date: 8/2/2023  Hospital Length of Stay: 7 days  Code Status: Full Code  Attending Provider: Jose Paulino MD  Primary Care Provider: Brandt Rucker MD   Principal Problem: Iron deficiency anemia    Subjective:     Interval History:  extubated yesterday to bipap and doing well. No complaints on exam.        Objective:     Vital Signs (Most Recent):  Temp: 98.4 °F (36.9 °C) (08/10/23 0645)  Pulse: 79 (08/10/23 0716)  Resp: (!) 23 (08/10/23 0716)  BP: (!) 143/65 (08/10/23 0645)  SpO2: 98 % (08/10/23 0716) Vital Signs (24h Range):  Temp:  [98.2 °F (36.8 °C)-99.5 °F (37.5 °C)] 98.4 °F (36.9 °C)  Pulse:  [74-98] 79  Resp:  [9-30] 23  SpO2:  [81 %-100 %] 98 %  BP: ()/(44-90) 143/65     Weight: (!) 142.7 kg (314 lb 9.5 oz)  Body mass index is 41.51 kg/m².      Intake/Output Summary (Last 24 hours) at 8/10/2023 1043  Last data filed at 8/10/2023 0601  Gross per 24 hour   Intake 145.42 ml   Output 2130 ml   Net -1984.58 ml        Physical Exam  Vitals reviewed.   Constitutional:       Appearance: He is obese.   HENT:      Right Ear: External ear normal.      Left Ear: External ear normal.      Mouth/Throat:      Mouth: Mucous membranes are moist.   Eyes:      Extraocular Movements: Extraocular movements intact.      Pupils: Pupils are equal, round, and reactive to light.   Cardiovascular:      Rate and Rhythm: Normal rate and regular rhythm.   Pulmonary:      Effort: Pulmonary effort is normal.      Breath sounds: Rhonchi present.   Abdominal:      General: Abdomen is flat. Bowel sounds are normal.      Palpations: Abdomen is soft.   Musculoskeletal:         General: Normal range of motion.      Cervical back: Normal range of motion.   Skin:     General: Skin is warm and dry.   Neurological:      General: No focal deficit present.      Mental Status: He is alert.           Review of  Systems    Vents:  Vent Mode: CPAP PSV (08/09/23 0743)  Set Rate: 0 BPM (08/09/23 0743)  Vt Set: 0 mL (08/09/23 0743)  Pressure Support: 10 cmH20 (08/09/23 0743)  PEEP/CPAP: 5 cmH20 (08/09/23 0743)  Oxygen Concentration (%): 40 (08/10/23 0716)  Peak Airway Pressure: 15 cmH20 (08/09/23 0743)  Total Ve: 4.3 L/m (08/09/23 0743)  F/VT Ratio<105 (RSBI): (!) 68.63 (08/09/23 0743)    Lines/Drains/Airways       Drain  Duration                  NG/OG Tube 08/05/23 1755 [place] sump Right nostril 4 days         Urethral Catheter 08/05/23 1800 Straight-tip 4 days              Peripheral Intravenous Line  Duration                  Peripheral IV - Single Lumen 08/05/23 1920 20 G Anterior;Distal;Right Upper Arm 4 days         Peripheral IV - Single Lumen 08/06/23 0940 20 G Anterior;Right Forearm 4 days                    Significant Labs:    CBC/Anemia Profile:  Recent Labs   Lab 08/09/23  0635 08/10/23  0550   WBC 14.45* 12.87*   HGB 10.2* 9.5*   HCT 38.2* 36.8*    261   MCV 81.6 84.0   RDW 26.8* 26.3*        Chemistries:  Recent Labs   Lab 08/09/23  0635 08/10/23  0550    142   K 3.1* 3.3*   CL 97* 98   CO2 38* 40*   BUN 23* 24*   CREATININE 1.11 0.88   CALCIUM 9.9 9.8       All pertinent labs within the past 24 hours have been reviewed.    Significant Imaging:  I have reviewed all pertinent imaging results/findings within the past 24 hours.    Assessment/Plan:     Psychiatric  Alcohol abuse  Last drink -- 08/02--   He drinks 6-12 drinks per night   IV thiamine, folate and PO multivitamin      Should be out of window for DTs     Pulmonary  Acute on chronic respiratory failure  - has some compliance issues with bipap at baseline  - respiratory distress with hypercapnia  requiring intubation 08/06   - duonebs, ICS, systemic steroids, 5 days abx     8/10- extuabted on 08/09 to bipap - doind well on NC today - has increased secretions, will add CPT with respiratory treatments.  OOB. PT/OT        COPD (chronic obstructive  pulmonary disease)  respiratory failure overnight requiring intubation on 08/06   continue duonebs, ICS, wean systemic steroids, abx for 5 days         Cardiac/Vascular  Acute on chronic heart failure  Acute on chronic  Takes lasix 40 mg BID PO at home, Strict I&O  Weight daily     ECHO --     Left Ventricle: The left ventricle is normal in size. Normal wall thickness. Normal wall motion. There is normal systolic function with a visually estimated ejection fraction of 55 - 60%. There is normal diastolic function.    Left Atrium: Left atrium is mildly dilated.    Right Ventricle: Normal right ventricular cavity size. Wall thickness is normal. Right ventricle wall motion  is normal. Systolic function is normal.    Mitral Valve: There is trace regurgitation.    Tricuspid Valve: There is trace regurgitation.    Pulmonic Valve: There is no significant stenosis. The estimated PA systolic pressure is 34 mmHg.    IVC/SVC: Normal venous pressure at 3 mmHg.           Renal/  KAREN (acute kidney injury)  Resolved     Cr 0.88-     Endocrine  Severe obesity (BMI >= 40)  Complicates all levels of care     Uncontrolled type 2 diabetes mellitus with hyperglycemia  - continue SSI   - monitor BG trend and adjust as needed   -  Fasting 83    Orthopedic  Gout  Home medications continued     Other  Sleep apnea  bipap qHS                   MARYCARMEN Norton-ACNP  Pulmonology  [facility]

## 2023-08-10 NOTE — PT/OT/SLP EVAL
Speech Language Pathology Evaluation  Bedside Swallow    Patient Name:  Abilio Carroll   MRN:  [id number]  Admitting Diagnosis: Iron deficiency anemia    Recommendations:                 General Recommendations:  Follow-up not indicated  Diet recommendations:   , Full liquids (Rec full liquid diet and advance as tolerated.)   Aspiration Precautions:  Patient will need assistance with all po intake for time being, he will require cues to cough/clear his throat and suction out secretions several times while eating to help keep secretions cleared, 1 bite/sip at a time, Assistance with meals, HOB to 90 degrees, Remain upright 30 minutes post meal, Small bites/sips, and Standard aspiration precautions   General Precautions: Standard,    Communication strategies:  none    Assessment:     Abilio Carroll is a 62 y.o. male with an SLP diagnosis of  s/p extubation .  He presents with copious secretions.    History:     Past Medical History:   Diagnosis Date    Cardiomyopathy     COPD (chronic obstructive pulmonary disease)     Diabetes mellitus, type 2     Gout     Heart failure, unspecified     Hypertension     Obstructive sleep apnea on CPAP     Osteoarthritis        History reviewed. No pertinent surgical history.    Social History: Unsure of patient's living arrangements.    Prior Intubation HX:  recently extubated    Modified Barium Swallow: n/a    Chest X-Rays: see chart    Prior diet: currently npo.    Occupation/hobbies/homemaking: not stated.    Subjective     Patient lying in bed (bed on percussion mode). Patient's nurse (Chiquis) positioned patient in the bed for BEDSIDE SWALLOW EVALUATION and was present during most of it. Patient reported that he just wanted to eat.  Patient goals: to be able to eat and drink     Pain/Comfort:       Respiratory Status:  face mask; see chart    Objective:     Oral Musculature Evaluation  Oral Musculature: WFL  Dentition: scattered dentition, teeth in poor condition  Secretion  Management: other (see comments) (Pt has copius secretions that he has to cough up and suction out every few minutes.)  Mucosal Quality: adequate  Oral Labial Strength and Mobility: WFL  Lingual Strength and Mobility: WFL    Bedside Swallow Eval:   Consistencies Assessed:  Thin liquids Patient was given small trials of water via a spoon and a straw. Patient cued to cough and clear his throat and suction prior to beginning swallow eval and several times during swallow eval to keep secretions cleared. No overt s/s of aspiration noted with liquids. No other consistencies tested secondary to the copious amounts of secretions patient has at this time.       Oral Phase:   WFL    Pharyngeal Phase:   no overt clinical signs/symptoms of aspiration  no overt clinical signs/symptoms of pharyngeal dysphagia    Compensatory Strategies  Patient should use suction after cough/throat clearing to keep secretions cleared  Volitional cough/throat clear    Treatment: Rec begin with a full liquid diet as tolerated. Patient will need assistance with po intake and cues to cough/clear his throat and use suction during intake to keep secretions cleared. May advance diet as tolerated as secretions clear.    Goals:   Multidisciplinary Problems       SLP Goals       Not on file                    Plan:     Patient to be seen:      Plan of Care expires:     Plan of Care reviewed with:      SLP Follow-Up:  No       Discharge recommendations:      Barriers to Discharge:  Decreased Care Giver Support    Time Tracking:     SLP Treatment Date:      Speech Start Time:  1350  Speech Stop Time:  1415     Speech Total Time (min):  25 min    Billable Minutes: Eval Swallow and Oral Function 25    08/10/2023

## 2023-08-10 NOTE — PLAN OF CARE
Problem: Gas Exchange Impaired  Goal: Optimal Gas Exchange  Outcome: Ongoing, Progressing     Problem: Skin Injury Risk Increased  Goal: Skin Health and Integrity  Outcome: Ongoing, Progressing     Problem: Noninvasive Ventilation Acute  Goal: Effective Unassisted Ventilation and Oxygenation  Outcome: Ongoing, Progressing

## 2023-08-10 NOTE — PLAN OF CARE
Problem: Adult Inpatient Plan of Care  Goal: Plan of Care Review  Outcome: Ongoing, Progressing  Goal: Patient-Specific Goal (Individualized)  Outcome: Ongoing, Progressing  Goal: Absence of Hospital-Acquired Illness or Injury  Outcome: Ongoing, Progressing  Goal: Optimal Comfort and Wellbeing  Outcome: Ongoing, Progressing  Goal: Readiness for Transition of Care  Outcome: Ongoing, Progressing     Problem: Bariatric Environmental Safety  Goal: Safety Maintained with Care  Outcome: Ongoing, Progressing     Problem: Diabetes Comorbidity  Goal: Blood Glucose Level Within Targeted Range  Outcome: Ongoing, Progressing     Problem: Fluid and Electrolyte Imbalance (Acute Kidney Injury/Impairment)  Goal: Fluid and Electrolyte Balance  Outcome: Ongoing, Progressing     Problem: Oral Intake Inadequate (Acute Kidney Injury/Impairment)  Goal: Optimal Nutrition Intake  Outcome: Ongoing, Progressing     Problem: Renal Function Impairment (Acute Kidney Injury/Impairment)  Goal: Effective Renal Function  Outcome: Ongoing, Progressing     Problem: Gas Exchange Impaired  Goal: Optimal Gas Exchange  Outcome: Ongoing, Progressing     Problem: Fall Injury Risk  Goal: Absence of Fall and Fall-Related Injury  Outcome: Ongoing, Progressing     Problem: Impaired Wound Healing  Goal: Optimal Wound Healing  Outcome: Ongoing, Progressing     Problem: Infection  Goal: Absence of Infection Signs and Symptoms  Outcome: Ongoing, Progressing     Problem: Skin Injury Risk Increased  Goal: Skin Health and Integrity  Outcome: Ongoing, Progressing     Problem: Noninvasive Ventilation Acute  Goal: Effective Unassisted Ventilation and Oxygenation  Outcome: Ongoing, Progressing

## 2023-08-10 NOTE — NURSING
Uneventful shift. Pt passed swallow evaluation with full liquid diet order placed, which pt tolerated well. Pt up to bedside commode x2 with x2 person assist with use of walker. X2 bowel movements today following lunch and dinner. Pt weaned down to room air with oxygen sats in high 90's. [place] catheter d/c. No complaints from pt

## 2023-08-10 NOTE — SUBJECTIVE & OBJECTIVE
Interval History:  extubated yesterday to bipap and doing well. No complaints on exam.        Objective:     Vital Signs (Most Recent):  Temp: 98.4 °F (36.9 °C) (08/10/23 0645)  Pulse: 79 (08/10/23 0716)  Resp: (!) 23 (08/10/23 0716)  BP: (!) 143/65 (08/10/23 0645)  SpO2: 98 % (08/10/23 0716) Vital Signs (24h Range):  Temp:  [98.2 °F (36.8 °C)-99.5 °F (37.5 °C)] 98.4 °F (36.9 °C)  Pulse:  [74-98] 79  Resp:  [9-30] 23  SpO2:  [81 %-100 %] 98 %  BP: ()/(44-90) 143/65     Weight: (!) 142.7 kg (314 lb 9.5 oz)  Body mass index is 41.51 kg/m².      Intake/Output Summary (Last 24 hours) at 8/10/2023 1043  Last data filed at 8/10/2023 0601  Gross per 24 hour   Intake 145.42 ml   Output 2130 ml   Net -1984.58 ml        Physical Exam  Vitals reviewed.   Constitutional:       Appearance: He is obese.   HENT:      Right Ear: External ear normal.      Left Ear: External ear normal.      Mouth/Throat:      Mouth: Mucous membranes are moist.   Eyes:      Extraocular Movements: Extraocular movements intact.      Pupils: Pupils are equal, round, and reactive to light.   Cardiovascular:      Rate and Rhythm: Normal rate and regular rhythm.   Pulmonary:      Effort: Pulmonary effort is normal.      Breath sounds: Rhonchi present.   Abdominal:      General: Abdomen is flat. Bowel sounds are normal.      Palpations: Abdomen is soft.   Musculoskeletal:         General: Normal range of motion.      Cervical back: Normal range of motion.   Skin:     General: Skin is warm and dry.   Neurological:      General: No focal deficit present.      Mental Status: He is alert.           Review of Systems    Vents:  Vent Mode: CPAP PSV (08/09/23 0743)  Set Rate: 0 BPM (08/09/23 0743)  Vt Set: 0 mL (08/09/23 0743)  Pressure Support: 10 cmH20 (08/09/23 0743)  PEEP/CPAP: 5 cmH20 (08/09/23 0743)  Oxygen Concentration (%): 40 (08/10/23 0716)  Peak Airway Pressure: 15 cmH20 (08/09/23 0743)  Total Ve: 4.3 L/m (08/09/23 0743)  F/VT Ratio<105 (RSBI):  (!) 68.63 (08/09/23 0743)    Lines/Drains/Airways       Drain  Duration                  NG/OG Tube 08/05/23 1755 [place] sump Right nostril 4 days         Urethral Catheter 08/05/23 1800 Straight-tip 4 days              Peripheral Intravenous Line  Duration                  Peripheral IV - Single Lumen 08/05/23 1920 20 G Anterior;Distal;Right Upper Arm 4 days         Peripheral IV - Single Lumen 08/06/23 0940 20 G Anterior;Right Forearm 4 days                    Significant Labs:    CBC/Anemia Profile:  Recent Labs   Lab 08/09/23  0635 08/10/23  0550   WBC 14.45* 12.87*   HGB 10.2* 9.5*   HCT 38.2* 36.8*    261   MCV 81.6 84.0   RDW 26.8* 26.3*        Chemistries:  Recent Labs   Lab 08/09/23  0635 08/10/23  0550    142   K 3.1* 3.3*   CL 97* 98   CO2 38* 40*   BUN 23* 24*   CREATININE 1.11 0.88   CALCIUM 9.9 9.8       All pertinent labs within the past 24 hours have been reviewed.    Significant Imaging:  I have reviewed all pertinent imaging results/findings within the past 24 hours.

## 2023-08-10 NOTE — PLAN OF CARE
Problem: Physical Therapy  Goal: Physical Therapy Goal  Description: Short Term Goals  Ambulate SBA - 100 feet with rolling walker assistive device.   Supine to sit minimum  Sit to stand minimum  SPT minimum  5. Independent with HEP    Long Term Goals  Ambulate SBA - 200 feet with rolling walker assistive device.   Supine to sit stand-by  Sit to stand stand-by  SPT stand-by  Needed equipment for home.         Outcome: Ongoing, Progressing

## 2023-08-10 NOTE — PROGRESS NOTES
[facility] ICU  Adult Nutrition  First Assessment Note         Reason for Assessment  Reason For Assessment: length of stay   Nutrition Risk Screen: no indicators present     RD Note. Patient has been extubated and ST eval ordered 8/10. If unable to advance po diet recommend alternate routes of nutrition.     Per MD  HPI: 62 year old male, extremely a poor historian with PMHx of TERI, COPD and Alcohol abuse presented to [facility] via EMS due to weakness. Patient reports around four hours ago after having 4 beers and a marijuana joint he started feeling suddenly weak and fatigue. He could not hold any objects in his hand and would drop the food. He couldn't grab a glass of water to drink. He could not ambulate because the right leg was weak. He reports the hands and UE were both weak to the same degree. He reports the entire body was shaky and jumpy.Patient reports he has experienced episodes of this before that would only last a few seconds but now this is the first time it was constant and lasted longer. Patient reports he was feeling dizziness and felt like he is about to pass out. Room was spinning around his head. No ringing in the ear. Vision was complety ok with no deficits. Did not lose conciousness. But he did loss urine without even noticing it. Patient denies sweating, no fever, no chill, no back pain, no chest pain, no abdominal pain. He reports stool is sometimes bloody and sometimes black and has chronic constipation. He has swelling of scrotum, edema in the legs. Chronic SOB and has home oxygen he reports he wears at nights. Currently at spO2 85% on room air and 94% with 3L NC.     Patient is a poor historian, does not know a single medication he takes and has no idea about his medical history. He has HTN. Patient reports he has seen a heart doctor 5 years ago at [place] but has not seen one since. He has Diabetes and takes 40 units long acting in the morning and SSI  throughout the day. Had colonoscopy once before but doesn't remember how many years. No heart burns and never had EGD. TERI with CPAP but doesn't wear CPAP.      Current 40 pack year smoking Hx. Smokes marijuana. Drinks alcohol everyday 6-12 drinks per day.        Malnutrition  Is Patient Malnourished: No  Skin Integrity  Noah Risk Assessment  Sensory Perception: 3-->slightly limited  Moisture: 3-->occasionally moist  Activity: 1-->bedfast  Mobility: 3-->slightly limited  Nutrition: 2-->probably inadequate  Friction and Shear: 2-->potential problem  Noah Score: 14    Nutrition Diagnosis  Inadequate energy intake   related to recent illness requiring intubation as evidenced by patient extubated and awaiting ST eval    Nutrition Diagnosis Status: Chronic/ continues        Nutrition Risk  Level of Risk/Frequency of Follow-up: high  Comments on nutrition risk: NPO  Recent Labs   Lab 08/10/23  0528 08/10/23  0550   GLU  --  82   POCGLU 83  --      Comments on Glucose: WNL  Nutrition Prescription / Recommendations  Recommendation/Intervention: Recommend advance diet as medically appropriate pending ST eval today 8/10  Goals: weight maintenance, diet advancement  Nutrition Goal Status: new  Current Diet Order: NPO pending ST eval  Chewing or Swallowing Difficulty?: unknown pending ST eval  Recommended Diet: per ST  Recommended Oral Supplement: No Oral Supplements  Is Nutrition Support Recommended: No  Is Education Recommended: No  Monitor and Evaluation  % current Intake: NPO  % intake to meet estimated needs: 75 - 100 %  Food and Nutrient Intake: energy intake  Anthropometric Measurements: weight, weight change, body mass index  Biochemical Data, Medical Tests and Procedures: electrolyte and renal panel, gastrointestinal profile, glucose/endocrine profile, inflammatory profile, lipid profile  Nutrition-Focused Physical Findings: overall appearance, extremities, muscles and bones, head and eyes, skin     Current Medical  "Diagnosis and Past Medical History  Diagnosis: diabetes diagnosis/complications, pulmonary disease  Past Medical History:   Diagnosis Date    Cardiomyopathy     COPD (chronic obstructive pulmonary disease)     Diabetes mellitus, type 2     Gout     Heart failure, unspecified     Hypertension     Obstructive sleep apnea on CPAP     Osteoarthritis      Nutrition/Diet History  Food Allergies: NKFA  Factors Affecting Nutritional Intake: NPO  Lab/Procedures/Meds  Recent Labs   Lab 08/10/23  0550      K 3.3*   BUN 24*   CREATININE 0.88   CALCIUM 9.8   CL 98     Last A1c: No results found for: "HGBA1C"  Lab Results   Component Value Date    RBC 4.38 (L) 08/10/2023    HGB 9.5 (L) 08/10/2023    HCT 36.8 (L) 08/10/2023    MCV 84.0 08/10/2023    MCH 21.7 (L) 08/10/2023    MCHC 25.8 (L) 08/10/2023    TIBC 576 (H) 08/04/2023     Pertinent Labs Reviewed: reviewed  Pertinent Labs Comments: 08 Sodium: 142  Potassium: 3.3 (L)  Chloride: 98  CO2: 40 (H)  Anion Gap: 7  BUN: 24 (H)  Creatinine: 0.88  BUN/CREAT RATIO: 27 (H)  eGFR: 98  Glucose: 82  Calcium: 9.8  Pertinent Medications Reviewed: reviewed  Pertinent Medications Comments: lasix, Vit D, zosyn, zithromax  Anthropometrics  Temp: 98.4 °F (36.9 °C)  Height Method: Estimated  Height: 6' 1" (185.4 cm)  Height (inches): 73 in  Weight Method: Bed Scale  Weight: (!) 142.7 kg (314 lb 9.5 oz)  Weight (lb): (!) 314.6 lb  Ideal Body Weight (IBW), Male: 184 lb  % Ideal Body Weight, Male (lb): 175.77 %  BMI (Calculated): 41.5  BMI Grade: greater than 40 - morbid obesity     Estimated/Assessed Needs     Total Ve: 4.3 L/m Temp: 98.4 °F (36.9 °C)Temporal  Weight Used For Calorie Calculations: 94.3 kg (207 lb 14.3 oz)   Energy Need Method: Kcal/kg Energy Calorie Requirements (kcal): 4386-9108  Weight Used For Protein Calculations: 94.3 kg (207 lb 14.3 oz)  Protein Requirements:        RDA Method (mL): 2357     Nutrition by Nursing  Diet/Nutrition Received: NPO         "   Last Bowel Movement: 08/03/23              Nutrition Follow-Up  RD Follow-up?: Yes

## 2023-08-10 NOTE — PT/OT/SLP EVAL
Occupational Therapy Evaluation     Name: Abilio Carroll  MRN: [id number]  Admitting Diagnosis: Iron deficiency anemia  Recent Surgery: * No surgery found *      Recommendations:     Discharge Recommendations: home with home health, home health PT, nursing facility, skilled  Level of Assistance Recommended: Intermittent assistance  Discharge Equipment Recommendations: walker, rolling  Barriers to discharge:      Assessment:     Abilio Carroll is a 62 y.o. male with a medical diagnosis of Iron deficiency anemia. He presents with performance deficits affecting function including weakness, impaired endurance, impaired self care skills, impaired functional mobility, gait instability, impaired balance, impaired cognition, decreased upper extremity function, decreased lower extremity function, decreased safety awareness, decreased ROM. Pt admitted with GI bleed and anemia, suffered respiratory distress requiring intubation from 8/6/ 2023 to 8/9/ 2023. Pt is motivated to improve, but far below his baseline level of function.    Rehab Prognosis: Good; patient would benefit from acute OT services to address these deficits and reach maximum level of function.    Plan:     Patient to be seen 5 x/week to address the above listed problems via self-care/home management, therapeutic activities, therapeutic exercises  Plan of Care Expires: 08/24/23  Plan of Care Reviewed with: patient    Subjective     Chief Complaint: Pt is having heavy secretions that he must use a yonker frequently to manage  Patient Comments/Goals: Pt hope to be able to return home with home health care at discharge  Pain/Comfort:  Pain Rating 1: 0/10  Pain Rating Post-Intervention 1: 0/10    Patients cultural, spiritual, Yazdanism conflicts given the current situation: no    Social History:  Living Environment: Patient lives with their nephew  in a single story home with number of outside stair(s): 2 steps  and tub only  Prior Level of Function: Prior to  admission, patient was modified independent  Roles and Routines: Patient was driving and not working prior to admission.  Equipment Used at Home: CPAP, oxygen  DME owned (not currently used): none  Assistance Upon Discharge: family and facility staff    Objective:     Communicated with HERMINIO Coombs prior to session. Patient found HOB elevated with oxygen, blood pressure cuff, pulse ox (continuous), telemetry, bird catheter upon OT entry to room.    General Precautions: Standard, fall   Orthopedic Precautions: N/A   Braces: N/A    Respiratory Status: Nasal cannula, flow 2 L/min    Occupational Performance    Gait belt applied - Yes    Bed Mobility:   Supine to sit from left side of bed with moderate assistance  Sit to Supine with moderate assistance on left side of bed    Functional Mobility/Transfers:  Pt transferred with PT prior to OT arrival. Pt required mod(A) sit to stand with RW, step transfer bed<--> BSC with RW and mod(A). Pt took small shuffling steps.    Activities of Daily Living:  Grooming: PT used yonker independently to clean secretions from mouth. Pt washed his face with full setup.  Lower Body Dressing: total assistance  Toileting: total assistance    Cognitive/Visual Perceptual:  Cognitive/Psychosocial Skills:    -     Oriented to: Person and Place  -     Follows Commands/attention: Follows one-step commands  -     Communication: clear/fluent  -     Safety awareness/insight to disability: impaired  -     Mood/Affect/Coping skills/emotional control: Cooperative  Visual/Perceptual:    -     wears reading glasses    Physical Exam:  Edema:  Mild (B) UE  Dominant hand: Right  Upper Extremity Range of Motion:     -       Right Upper Extremity: WFL except shoulder is mildly limited for AROM  -       Left Upper Extremity: WFL except shoulder is mildly impaired for AROM  Upper Extremity Strength:    -       Right Upper Extremity: shoulder 3(-)/5, otherwise 4/5  -       Left Upper Extremity: shoulder  3(-)/5, otherwise 4/5   Strength:    -       Right Upper Extremity: WFL  -       Left Upper Extremity: WFL  Fine Motor Coordination:    -       Intact  Gross motor coordination:   WFL    AMPAC 6 Click ADL:  AMPAC Total Score: 12    Treatment & Education:  Therapist provided facilitation and instruction of proper body mechanics, energy conservation, and fall prevention strategies during tasks listed above  Patient educated on role of OT, POC, and goals for therapy  Patient educated on importance of OOB activities with staff member assistance and sitting OOB majority of the day        Patient left HOB elevated with all lines intact, call button in reach, RN notified, and RN present.    GOALS:   Multidisciplinary Problems       Occupational Therapy Goals          Problem: Occupational Therapy    Goal Priority Disciplines Outcome Interventions   Occupational Therapy Goal     OT, PT/OT Ongoing, Progressing    Description: STG: (in 2 weeks)  Pt will perform grooming with setup  Pt will bathe with mod(A)  Pt will perform UE dressing with setup and min(A)  Pt will perform LE dressing with moderate (A)  Pt will sit EOB x 7 min with SBA  Pt will transfer bed/chair/bsc with min(A) with RW  Pt will perform standing task x 3 min with CGA with RW  Pt will tolerate 15 minutes of tx without fatigue      LTG: (in 4 weeks)  1.Restore to max I with self care and mobility.                          History:     Past Medical History:   Diagnosis Date    Cardiomyopathy     COPD (chronic obstructive pulmonary disease)     Diabetes mellitus, type 2     Gout     Heart failure, unspecified     Hypertension     Obstructive sleep apnea on CPAP     Osteoarthritis        History reviewed. No pertinent surgical history.    Time Tracking:     OT Date of Treatment: 08/10/23  OT Start Time:  1438  OT Stop Time: 1501  OT Total Time (min):  23 min    Billable Minutes: Evaluation low complexity    8/10/2023

## 2023-08-11 LAB
ANION GAP SERPL CALCULATED.3IONS-SCNC: 7 MMOL/L (ref 7–16)
ANISOCYTOSIS BLD QL SMEAR: ABNORMAL
BACTERIA BLD CULT: NORMAL
BACTERIA BLD CULT: NORMAL
BASOPHILS # BLD AUTO: 0.04 K/UL (ref 0–0.2)
BASOPHILS NFR BLD AUTO: 0.3 % (ref 0–1)
BUN SERPL-MCNC: 25 MG/DL (ref 7–18)
BUN/CREAT SERPL: 24 (ref 6–20)
CALCIUM SERPL-MCNC: 9.9 MG/DL (ref 8.5–10.1)
CHLORIDE SERPL-SCNC: 98 MMOL/L (ref 98–107)
CO2 SERPL-SCNC: 41 MMOL/L (ref 21–32)
CREAT SERPL-MCNC: 1.04 MG/DL (ref 0.7–1.3)
DIFFERENTIAL METHOD BLD: ABNORMAL
EGFR (NO RACE VARIABLE) (RUSH/TITUS): 82 ML/MIN/1.73M2
EOSINOPHIL # BLD AUTO: 0.14 K/UL (ref 0–0.5)
EOSINOPHIL NFR BLD AUTO: 1.1 % (ref 1–4)
ERYTHROCYTE [DISTWIDTH] IN BLOOD BY AUTOMATED COUNT: 26 % (ref 11.5–14.5)
GLUCOSE SERPL-MCNC: 106 MG/DL (ref 70–105)
GLUCOSE SERPL-MCNC: 172 MG/DL (ref 70–105)
GLUCOSE SERPL-MCNC: 85 MG/DL (ref 74–106)
GLUCOSE SERPL-MCNC: 89 MG/DL (ref 70–105)
HCT VFR BLD AUTO: 39.8 % (ref 40–54)
HGB BLD-MCNC: 10.1 G/DL (ref 13.5–18)
HYPOCHROMIA BLD QL SMEAR: ABNORMAL
IMM GRANULOCYTES # BLD AUTO: 0.04 K/UL (ref 0–0.04)
IMM GRANULOCYTES NFR BLD: 0.3 % (ref 0–0.4)
LYMPHOCYTES # BLD AUTO: 1.68 K/UL (ref 1–4.8)
LYMPHOCYTES NFR BLD AUTO: 13.5 % (ref 27–41)
MCH RBC QN AUTO: 21.8 PG (ref 27–31)
MCHC RBC AUTO-ENTMCNC: 25.4 G/DL (ref 32–36)
MCV RBC AUTO: 86 FL (ref 80–96)
MONOCYTES # BLD AUTO: 1.15 K/UL (ref 0–0.8)
MONOCYTES NFR BLD AUTO: 9.2 % (ref 2–6)
MPC BLD CALC-MCNC: 10.4 FL (ref 9.4–12.4)
NEUTROPHILS # BLD AUTO: 9.44 K/UL (ref 1.8–7.7)
NEUTROPHILS NFR BLD AUTO: 75.6 % (ref 53–65)
NRBC # BLD AUTO: 0 X10E3/UL
NRBC, AUTO (.00): 0 %
PLATELET # BLD AUTO: 298 K/UL (ref 150–400)
PLATELET MORPHOLOGY: ABNORMAL
POLYCHROMASIA BLD QL SMEAR: ABNORMAL
POTASSIUM SERPL-SCNC: 3.4 MMOL/L (ref 3.5–5.1)
RBC # BLD AUTO: 4.63 M/UL (ref 4.6–6.2)
SODIUM SERPL-SCNC: 143 MMOL/L (ref 136–145)
WBC # BLD AUTO: 12.49 K/UL (ref 4.5–11)

## 2023-08-11 PROCEDURE — 25000003 PHARM REV CODE 250: Performed by: NURSE PRACTITIONER

## 2023-08-11 PROCEDURE — 99900026 HC AIRWAY MAINTENANCE (STAT)

## 2023-08-11 PROCEDURE — 97530 THERAPEUTIC ACTIVITIES: CPT

## 2023-08-11 PROCEDURE — 25000242 PHARM REV CODE 250 ALT 637 W/ HCPCS: Performed by: NURSE PRACTITIONER

## 2023-08-11 PROCEDURE — 94640 AIRWAY INHALATION TREATMENT: CPT

## 2023-08-11 PROCEDURE — 63600175 PHARM REV CODE 636 W HCPCS: Performed by: INTERNAL MEDICINE

## 2023-08-11 PROCEDURE — 85025 COMPLETE CBC W/AUTO DIFF WBC: CPT

## 2023-08-11 PROCEDURE — 27000221 HC OXYGEN, UP TO 24 HOURS

## 2023-08-11 PROCEDURE — 94761 N-INVAS EAR/PLS OXIMETRY MLT: CPT

## 2023-08-11 PROCEDURE — 99233 PR SUBSEQUENT HOSPITAL CARE,LEVL III: ICD-10-PCS | Mod: ,,, | Performed by: INTERNAL MEDICINE

## 2023-08-11 PROCEDURE — 99233 SBSQ HOSP IP/OBS HIGH 50: CPT | Mod: ,,, | Performed by: INTERNAL MEDICINE

## 2023-08-11 PROCEDURE — 63600175 PHARM REV CODE 636 W HCPCS: Performed by: NURSE PRACTITIONER

## 2023-08-11 PROCEDURE — 11000001 HC ACUTE MED/SURG PRIVATE ROOM

## 2023-08-11 PROCEDURE — 97110 THERAPEUTIC EXERCISES: CPT

## 2023-08-11 PROCEDURE — 25000003 PHARM REV CODE 250: Performed by: HOSPITALIST

## 2023-08-11 PROCEDURE — 82962 GLUCOSE BLOOD TEST: CPT

## 2023-08-11 PROCEDURE — 80048 BASIC METABOLIC PNL TOTAL CA: CPT

## 2023-08-11 PROCEDURE — 94660 CPAP INITIATION&MGMT: CPT

## 2023-08-11 PROCEDURE — C9113 INJ PANTOPRAZOLE SODIUM, VIA: HCPCS | Performed by: NURSE PRACTITIONER

## 2023-08-11 PROCEDURE — 97535 SELF CARE MNGMENT TRAINING: CPT

## 2023-08-11 PROCEDURE — 25000242 PHARM REV CODE 250 ALT 637 W/ HCPCS

## 2023-08-11 PROCEDURE — 99900035 HC TECH TIME PER 15 MIN (STAT)

## 2023-08-11 RX ADMIN — FOLIC ACID 1 MG: 1 TABLET ORAL at 08:08

## 2023-08-11 RX ADMIN — IPRATROPIUM BROMIDE AND ALBUTEROL SULFATE 3 ML: 2.5; .5 SOLUTION RESPIRATORY (INHALATION) at 12:08

## 2023-08-11 RX ADMIN — INSULIN ASPART 2 UNITS: 100 INJECTION, SOLUTION INTRAVENOUS; SUBCUTANEOUS at 06:08

## 2023-08-11 RX ADMIN — PANTOPRAZOLE SODIUM 40 MG: 40 INJECTION, POWDER, FOR SOLUTION INTRAVENOUS at 08:08

## 2023-08-11 RX ADMIN — CARVEDILOL 6.25 MG: 6.25 TABLET, FILM COATED ORAL at 08:08

## 2023-08-11 RX ADMIN — AMLODIPINE BESYLATE 10 MG: 10 TABLET ORAL at 08:08

## 2023-08-11 RX ADMIN — PIPERACILLIN AND TAZOBACTAM 4.5 G: 4; .5 INJECTION, POWDER, FOR SOLUTION INTRAVENOUS; PARENTERAL at 03:08

## 2023-08-11 RX ADMIN — GUAIFENESIN 1200 MG: 600 TABLET, EXTENDED RELEASE ORAL at 08:08

## 2023-08-11 RX ADMIN — Medication 100 MG: at 08:08

## 2023-08-11 RX ADMIN — Medication 1000 UNITS: at 08:08

## 2023-08-11 RX ADMIN — BUDESONIDE 0.25 MG: 0.25 SUSPENSION RESPIRATORY (INHALATION) at 08:08

## 2023-08-11 RX ADMIN — IPRATROPIUM BROMIDE AND ALBUTEROL SULFATE 3 ML: 2.5; .5 SOLUTION RESPIRATORY (INHALATION) at 08:08

## 2023-08-11 RX ADMIN — IPRATROPIUM BROMIDE AND ALBUTEROL SULFATE 3 ML: 2.5; .5 SOLUTION RESPIRATORY (INHALATION) at 01:08

## 2023-08-11 RX ADMIN — ALLOPURINOL 300 MG: 300 TABLET ORAL at 08:08

## 2023-08-11 RX ADMIN — CARVEDILOL 6.25 MG: 6.25 TABLET, FILM COATED ORAL at 06:08

## 2023-08-11 RX ADMIN — ATORVASTATIN CALCIUM 40 MG: 40 TABLET, FILM COATED ORAL at 08:08

## 2023-08-11 RX ADMIN — THERA TABS 1 TABLET: TAB at 08:08

## 2023-08-11 RX ADMIN — FUROSEMIDE 40 MG: 10 INJECTION, SOLUTION INTRAMUSCULAR; INTRAVENOUS at 08:08

## 2023-08-11 RX ADMIN — COLCHICINE 0.6 MG: 0.6 TABLET ORAL at 08:08

## 2023-08-11 RX ADMIN — PIPERACILLIN AND TAZOBACTAM 4.5 G: 4; .5 INJECTION, POWDER, FOR SOLUTION INTRAVENOUS; PARENTERAL at 11:08

## 2023-08-11 RX ADMIN — IPRATROPIUM BROMIDE AND ALBUTEROL SULFATE 3 ML: 2.5; .5 SOLUTION RESPIRATORY (INHALATION) at 07:08

## 2023-08-11 NOTE — SUBJECTIVE & OBJECTIVE
Interval History:  No acute events overnight.  extubated 8-9 to bipap and did well. No complaints on exam.  He is now on nasal cannula and doing well.      Objective:     Vital Signs (Most Recent):  Temp: 98 °F (36.7 °C) (08/11/23 1130)  Pulse: 79 (08/11/23 1330)  Resp: 18 (08/11/23 1330)  BP: 130/73 (08/11/23 1330)  SpO2: 98 % (08/11/23 1330) Vital Signs (24h Range):  Temp:  [98 °F (36.7 °C)-98.7 °F (37.1 °C)] 98 °F (36.7 °C)  Pulse:  [] 79  Resp:  [0-34] 18  SpO2:  [82 %-100 %] 98 %  BP: (104-159)/(51-94) 130/73     Weight: (!) 137.2 kg (302 lb 7.5 oz)  Body mass index is 39.91 kg/m².      Intake/Output Summary (Last 24 hours) at 8/11/2023 1448  Last data filed at 8/11/2023 0600  Gross per 24 hour   Intake 628.76 ml   Output 360 ml   Net 268.76 ml          Physical Exam  Vitals reviewed.   Constitutional:       General: He is not in acute distress.     Appearance: He is obese. He is not ill-appearing.   HENT:      Right Ear: External ear normal.      Left Ear: External ear normal.      Mouth/Throat:      Mouth: Mucous membranes are moist.   Eyes:      Extraocular Movements: Extraocular movements intact.      Pupils: Pupils are equal, round, and reactive to light.   Cardiovascular:      Rate and Rhythm: Normal rate and regular rhythm.   Pulmonary:      Effort: Pulmonary effort is normal.      Breath sounds: Rhonchi present. No wheezing or rales.   Abdominal:      General: Abdomen is flat. Bowel sounds are normal. There is no distension.      Palpations: Abdomen is soft.   Musculoskeletal:         General: Normal range of motion.      Cervical back: Normal range of motion.   Skin:     General: Skin is warm and dry.      Coloration: Skin is not pale.   Neurological:      General: No focal deficit present.      Mental Status: He is alert. Mental status is at baseline.      Cranial Nerves: No cranial nerve deficit.   Psychiatric:         Behavior: Behavior normal.           Review of Systems    Vents:  Vent Mode:  CPAP PSV (08/09/23 0743)  Set Rate: 0 BPM (08/09/23 0743)  Vt Set: 0 mL (08/09/23 0743)  Pressure Support: 10 cmH20 (08/09/23 0743)  PEEP/CPAP: 5 cmH20 (08/09/23 0743)  Oxygen Concentration (%): 28 (08/11/23 0804)  Peak Airway Pressure: 15 cmH20 (08/09/23 0743)  Total Ve: 4.3 L/m (08/09/23 0743)  F/VT Ratio<105 (RSBI): (!) 68.63 (08/09/23 0743)    Lines/Drains/Airways       Peripheral Intravenous Line  Duration                  Peripheral IV - Single Lumen 08/05/23 1920 20 G Anterior;Distal;Right Upper Arm 5 days         Peripheral IV - Single Lumen 08/06/23 0940 20 G Anterior;Right Forearm 5 days                    Significant Labs:    CBC/Anemia Profile:  Recent Labs   Lab 08/10/23  0550 08/11/23 0606   WBC 12.87* 12.49*   HGB 9.5* 10.1*   HCT 36.8* 39.8*    298   MCV 84.0 86.0   RDW 26.3* 26.0*          Chemistries:  Recent Labs   Lab 08/10/23  0550 08/11/23 0606    143   K 3.3* 3.4*   CL 98 98   CO2 40* 41*   BUN 24* 25*   CREATININE 0.88 1.04   CALCIUM 9.8 9.9         All pertinent labs within the past 24 hours have been reviewed.    Significant Imaging:  I have reviewed all pertinent imaging results/findings within the past 24 hours.

## 2023-08-11 NOTE — PLAN OF CARE
Chart review. Patient is getting iv meds. Has been extubated. Being treated by PT/OT. Plans to move to regular room soon. Prior to admit patient was at home with family. Will follow.

## 2023-08-11 NOTE — PT/OT/SLP PROGRESS
Physical Therapy Treatment    Patient Name:  Abilio Carroll   MRN:  [id number]    Recommendations:     Discharge Recommendations: nursing facility, skilled, rehabilitation facility  Discharge Equipment Recommendations: walker, rolling  Barriers to discharge:  ongoing medical care    Assessment:     Abilio Carroll is a 62 y.o. male admitted with a medical diagnosis of Iron deficiency anemia.  He presents with the following impairments/functional limitations: weakness, impaired endurance, impaired functional mobility, gait instability, decreased lower extremity function, impaired cardiopulmonary response to activity. Pt demo improved performance with mobility this session.     Rehab Prognosis: Good; patient would benefit from acute skilled PT services to address these deficits and reach maximum level of function.    Recent Surgery: * No surgery found *      Plan:     During this hospitalization, patient to be seen 5 x/week to address the identified rehab impairments via gait training, therapeutic activities, therapeutic exercises and progress toward the following goals:    Plan of Care Expires:  09/11/23    Subjective     Chief Complaint: anemia  Patient/Family Comments/goals: agreeable to PT  Pain/Comfort:  Pain Rating 1: 0/10      Objective:     Communicated with RN prior to session.  Patient found HOB elevated with oxygen, pulse ox (continuous), blood pressure cuff, telemetry, SCD upon PT entry to room.     General Precautions: Standard, fall  Orthopedic Precautions: N/A  Braces: N/A  Respiratory Status: Nasal cannula, flow 2 L/min     Functional Mobility:  Bed Mobility:     Supine to Sit: minimum assistance  Transfers:     Sit to Stand:  minimum assistance and moderate assistance with rolling walker  Gait: 5 steps from EOB to recliner with RW, CGA with short steps, forward trunk lean, slow ani  Balance: Fair in stance      AM-PAC 6 CLICK MOBILITY  Turning over in bed (including adjusting bedclothes, sheets and  blankets)?: 3  Sitting down on and standing up from a chair with arms (e.g., wheelchair, bedside commode, etc.): 3  Moving from lying on back to sitting on the side of the bed?: 3  Moving to and from a bed to a chair (including a wheelchair)?: 3  Need to walk in hospital room?: 3  Climbing 3-5 steps with a railing?: 1  Basic Mobility Total Score: 16       Treatment & Education:  Bilateral lower extremity exercise x 20 reps: ankle pumps, hip abduction/adduction, Long arc quads, and Marching with Stand by assist, verbal cues for sequencing and safety, and intermittent resting      Patient left up in chair with all lines intact, call button in reach, and RN notified..    GOALS:   Multidisciplinary Problems       Physical Therapy Goals          Problem: Physical Therapy    Goal Priority Disciplines Outcome Goal Variances Interventions   Physical Therapy Goal     PT, PT/OT Ongoing, Progressing     Description: Short Term Goals  Ambulate SBA - 100 feet with rolling walker assistive device.   Supine to sit minimum  Sit to stand minimum  SPT minimum  5. Independent with HEP    Long Term Goals  Ambulate SBA - 200 feet with rolling walker assistive device.   Supine to sit stand-by  Sit to stand stand-by  SPT stand-by  Needed equipment for home.                              Time Tracking:     PT Received On: 08/11/23  PT Start Time: 1329     PT Stop Time: 1400  PT Total Time (min): 31 min     Billable Minutes: Therapeutic Activity 15 and Therapeutic Exercise 15    Treatment Type: Treatment        Number of PTA visits since last PT visit: 0     08/11/2023

## 2023-08-11 NOTE — PROGRESS NOTES
[facility]  Pulmonology  Progress Note    Patient Name: Abilio Carroll  MRN: [id number]  Admission Date: 8/2/2023  Hospital Length of Stay: 8 days  Code Status: Full Code  Attending Provider: Jose Paulino MD  Primary Care Provider: Brandt Rucker MD   Principal Problem: Iron deficiency anemia    Subjective:     Interval History:  No acute events overnight.  extubated 8-9 to bipap and did well. No complaints on exam.  He is now on nasal cannula and doing well.      Objective:     Vital Signs (Most Recent):  Temp: 98 °F (36.7 °C) (08/11/23 1130)  Pulse: 79 (08/11/23 1330)  Resp: 18 (08/11/23 1330)  BP: 130/73 (08/11/23 1330)  SpO2: 98 % (08/11/23 1330) Vital Signs (24h Range):  Temp:  [98 °F (36.7 °C)-98.7 °F (37.1 °C)] 98 °F (36.7 °C)  Pulse:  [] 79  Resp:  [0-34] 18  SpO2:  [82 %-100 %] 98 %  BP: (104-159)/(51-94) 130/73     Weight: (!) 137.2 kg (302 lb 7.5 oz)  Body mass index is 39.91 kg/m².      Intake/Output Summary (Last 24 hours) at 8/11/2023 1448  Last data filed at 8/11/2023 0600  Gross per 24 hour   Intake 628.76 ml   Output 360 ml   Net 268.76 ml          Physical Exam  Vitals reviewed.   Constitutional:       General: He is not in acute distress.     Appearance: He is obese. He is not ill-appearing.   HENT:      Right Ear: External ear normal.      Left Ear: External ear normal.      Mouth/Throat:      Mouth: Mucous membranes are moist.   Eyes:      Extraocular Movements: Extraocular movements intact.      Pupils: Pupils are equal, round, and reactive to light.   Cardiovascular:      Rate and Rhythm: Normal rate and regular rhythm.   Pulmonary:      Effort: Pulmonary effort is normal.      Breath sounds: Rhonchi present. No wheezing or rales.   Abdominal:      General: Abdomen is flat. Bowel sounds are normal. There is no distension.      Palpations: Abdomen is soft.   Musculoskeletal:         General: Normal range of motion.      Cervical back: Normal range of motion.    Skin:     General: Skin is warm and dry.      Coloration: Skin is not pale.   Neurological:      General: No focal deficit present.      Mental Status: He is alert. Mental status is at baseline.      Cranial Nerves: No cranial nerve deficit.   Psychiatric:         Behavior: Behavior normal.           Review of Systems    Vents:  Vent Mode: CPAP PSV (08/09/23 0743)  Set Rate: 0 BPM (08/09/23 0743)  Vt Set: 0 mL (08/09/23 0743)  Pressure Support: 10 cmH20 (08/09/23 0743)  PEEP/CPAP: 5 cmH20 (08/09/23 0743)  Oxygen Concentration (%): 28 (08/11/23 0804)  Peak Airway Pressure: 15 cmH20 (08/09/23 0743)  Total Ve: 4.3 L/m (08/09/23 0743)  F/VT Ratio<105 (RSBI): (!) 68.63 (08/09/23 0743)    Lines/Drains/Airways       Peripheral Intravenous Line  Duration                  Peripheral IV - Single Lumen 08/05/23 1920 20 G Anterior;Distal;Right Upper Arm 5 days         Peripheral IV - Single Lumen 08/06/23 0940 20 G Anterior;Right Forearm 5 days                    Significant Labs:    CBC/Anemia Profile:  Recent Labs   Lab 08/10/23  0550 08/11/23 0606   WBC 12.87* 12.49*   HGB 9.5* 10.1*   HCT 36.8* 39.8*    298   MCV 84.0 86.0   RDW 26.3* 26.0*          Chemistries:  Recent Labs   Lab 08/10/23  0550 08/11/23 0606    143   K 3.3* 3.4*   CL 98 98   CO2 40* 41*   BUN 24* 25*   CREATININE 0.88 1.04   CALCIUM 9.8 9.9         All pertinent labs within the past 24 hours have been reviewed.    Significant Imaging:  I have reviewed all pertinent imaging results/findings within the past 24 hours.    Assessment/Plan:     Psychiatric  Alcohol abuse  Last drink -- 08/02--   He drinks 6-12 drinks per night   IV thiamine, folate and PO multivitamin      Should be out of window for DTs     Pulmonary  Acute on chronic respiratory failure  - has some compliance issues with bipap at baseline  - respiratory distress with hypercapnia  requiring intubation 08/06   - duonebs, ICS, systemic steroids, 5 days abx     8/10- extuabted on  08/09 to bipap - doind well on NC today - has increased secretions, will add CPT with respiratory treatments.  OOB. PT/OT        COPD (chronic obstructive pulmonary disease)  respiratory failure overnight requiring intubation on 08/06   continue duonebs, ICS, wean systemic steroids, abx for 5 days   Now on nasal cannula        Cardiac/Vascular  Acute on chronic heart failure  Acute on chronic  Takes lasix 40 mg BID PO at home, Strict I&O  Weight daily     ECHO --     Left Ventricle: The left ventricle is normal in size. Normal wall thickness. Normal wall motion. There is normal systolic function with a visually estimated ejection fraction of 55 - 60%. There is normal diastolic function.    Left Atrium: Left atrium is mildly dilated.    Right Ventricle: Normal right ventricular cavity size. Wall thickness is normal. Right ventricle wall motion  is normal. Systolic function is normal.    Mitral Valve: There is trace regurgitation.    Tricuspid Valve: There is trace regurgitation.    Pulmonic Valve: There is no significant stenosis. The estimated PA systolic pressure is 34 mmHg.    IVC/SVC: Normal venous pressure at 3 mmHg.       Currently appears euvolemic    Essential hypertension  Stable     Renal/  KAREN (acute kidney injury)  Resolved     Cr 0.88-     Oncology  Acute blood loss anemia    Presented with H/H of 5.3/22 with MCV in 70s-- transfused 2 units PRBCs   Reports have been seeing bright red blood in stool and dark black tarry stools at times  GI consulted and now s/p EDG -- Protonix 40 mg BID  H/H now stable  10.2/38.2    Endocrine  Severe obesity (BMI >= 40)  Complicates all levels of care     Uncontrolled type 2 diabetes mellitus with hyperglycemia  - continue SSI   - monitor BG trend and adjust as needed   -  blood glucose currently well controlled    Other  Sleep apnea  bipap qHS                   Jer Guzman, DO  Pulmonology  [facility]

## 2023-08-11 NOTE — ASSESSMENT & PLAN NOTE
- continue SSI   - monitor BG trend and adjust as needed   -  blood glucose currently well controlled

## 2023-08-11 NOTE — ASSESSMENT & PLAN NOTE
respiratory failure overnight requiring intubation on 08/06   continue duonebs, ICS, wean systemic steroids, abx for 5 days   Now on nasal cannula

## 2023-08-11 NOTE — ASSESSMENT & PLAN NOTE
Acute on chronic  Takes lasix 40 mg BID PO at home, Strict I&O  Weight daily     ECHO --     Left Ventricle: The left ventricle is normal in size. Normal wall thickness. Normal wall motion. There is normal systolic function with a visually estimated ejection fraction of 55 - 60%. There is normal diastolic function.    Left Atrium: Left atrium is mildly dilated.    Right Ventricle: Normal right ventricular cavity size. Wall thickness is normal. Right ventricle wall motion  is normal. Systolic function is normal.    Mitral Valve: There is trace regurgitation.    Tricuspid Valve: There is trace regurgitation.    Pulmonic Valve: There is no significant stenosis. The estimated PA systolic pressure is 34 mmHg.    IVC/SVC: Normal venous pressure at 3 mmHg.       Currently appears euvolemic

## 2023-08-11 NOTE — PLAN OF CARE
Problem: Gas Exchange Impaired  Goal: Optimal Gas Exchange  8/11/2023 1818 by Diana Brian R, RRT  Outcome: Ongoing, Progressing  8/11/2023 1210 by Diana Brian, RRT  Outcome: Ongoing, Progressing

## 2023-08-11 NOTE — PLAN OF CARE
Problem: Oral Intake Inadequate (Acute Kidney Injury/Impairment)  Goal: Optimal Nutrition Intake  Outcome: Ongoing, Progressing  Intervention: Promote and Optimize Nutrition  Flowsheets (Taken 8/11/2023 1629)  Oral Nutrition Promotion:   calorie-dense liquids provided   physical activity promoted   rest periods promoted     Problem: Fall Injury Risk  Goal: Absence of Fall and Fall-Related Injury  Outcome: Ongoing, Progressing  Intervention: Identify and Manage Contributors  Flowsheets (Taken 8/11/2023 1629)  Self-Care Promotion:   independence encouraged   meal set-up provided   safe use of adaptive equipment encouraged  Medication Review/Management: medications reviewed

## 2023-08-11 NOTE — PT/OT/SLP PROGRESS
Occupational Therapy   Treatment    Name: Abilio Carroll  MRN: [id number]  Admitting Diagnosis:  Iron deficiency anemia       Recommendations:     Discharge Recommendations: rehabilitation facility, nursing facility, skilled  Discharge Equipment Recommendations:  walker, rolling  Barriers to discharge:       Assessment:     bAilio Carroll is a 62 y.o. male with a medical diagnosis of Iron deficiency anemia.  He presents with alert and agreeable to treatment. Performance deficits affecting function are weakness, impaired endurance, impaired self care skills, impaired functional mobility, gait instability, impaired balance, decreased coordination, decreased upper extremity function, decreased lower extremity function.     Rehab Prognosis:  Good; patient would benefit from acute skilled OT services to address these deficits and reach maximum level of function.       Plan:     Patient to be seen 5 x/week to address the above listed problems via self-care/home management, therapeutic activities, therapeutic exercises  Plan of Care Expires: 08/24/23  Plan of Care Reviewed with: patient    Subjective     Chief Complaint: Pt admitted with anemia, suspected GI bleed, suffered respiratory distress   Patient/Family Comments/goals: Pt wants to improve his strength, function , and his health  Pain/Comfort:  Pain Rating 1: 0/10  Pain Rating Post-Intervention 1: 0/10    Objective:     Communicated with: HERMINIO Oconnell prior to session.  Patient found HOB elevated with oxygen, peripheral IV, pulse ox (continuous), blood pressure cuff, telemetry, SCD, pressure relief boots upon OT entry to room.    General Precautions: Standard, fall    Orthopedic Precautions:N/A  Braces: N/A  Respiratory Status: Nasal cannula, flow 2 L/min     Occupational Performance:     Bed Mobility:    Patient completed Rolling/Turning to Left with  minimum assistance  Patient completed Rolling/Turning to Right with minimum assistance     Functional  Mobility/Transfers:  NT    Activities of Daily Living:  Feeding:  Pt requires full setup. Pt wash having difficulty holing his spoon, so therapist provided a foam handle and pt was able to feed himself more easily with less spills. Pt required increased time to feed himself as he continues to cough up heavy secretions regularly.   Grooming: Pt washed his face with full setup and a little elbow support, Pt washed off spills from his chest with setup. Pt washed his hands with setup    Upper Body Dressing: moderate assistance for hospital gown due to lines      AMPAC 6 Click ADL: 14    Treatment & Education:  Pt is encouraged to perform AROM exercises with (B) UE off and on through out the day to continue to improve movement and strength.    Patient left HOB elevated with all lines intact, call button in reach, and RN Malvin notified    GOALS:   Multidisciplinary Problems       Occupational Therapy Goals          Problem: Occupational Therapy    Goal Priority Disciplines Outcome Interventions   Occupational Therapy Goal     OT, PT/OT Ongoing, Progressing    Description: STG: (in 2 weeks)  Pt will perform grooming with setup  Pt will bathe with mod(A)  Pt will perform UE dressing with setup and min(A)  Pt will perform LE dressing with moderate (A)  Pt will sit EOB x 7 min with SBA  Pt will transfer bed/chair/bsc with min(A) with RW  Pt will perform standing task x 3 min with CGA with RW  Pt will tolerate 15 minutes of tx without fatigue      LTG: (in 4 weeks)  1.Restore to max I with self care and mobility.                          Time Tracking:     OT Date of Treatment: 08/11/23  OT Start Time: 0950  OT Stop Time: 1050  OT Total Time (min): 60 min    Billable Minutes:Self Care/Home Management 35 min    OT/ZEINA: OT          8/11/2023

## 2023-08-12 PROBLEM — E66.2 OBESITY HYPOVENTILATION SYNDROME: Status: ACTIVE | Noted: 2023-08-12

## 2023-08-12 PROBLEM — N17.9 AKI (ACUTE KIDNEY INJURY): Status: RESOLVED | Noted: 2023-08-03 | Resolved: 2023-08-12

## 2023-08-12 LAB
ANION GAP SERPL CALCULATED.3IONS-SCNC: 7 MMOL/L (ref 7–16)
ANISOCYTOSIS BLD QL SMEAR: ABNORMAL
BASOPHILS # BLD AUTO: 0.03 K/UL (ref 0–0.2)
BASOPHILS NFR BLD AUTO: 0.2 % (ref 0–1)
BUN SERPL-MCNC: 28 MG/DL (ref 7–18)
BUN/CREAT SERPL: 27 (ref 6–20)
CALCIUM SERPL-MCNC: 9.8 MG/DL (ref 8.5–10.1)
CHLORIDE SERPL-SCNC: 96 MMOL/L (ref 98–107)
CO2 SERPL-SCNC: 39 MMOL/L (ref 21–32)
CREAT SERPL-MCNC: 1.04 MG/DL (ref 0.7–1.3)
DIFFERENTIAL METHOD BLD: ABNORMAL
EGFR (NO RACE VARIABLE) (RUSH/TITUS): 82 ML/MIN/1.73M2
EOSINOPHIL # BLD AUTO: 0.28 K/UL (ref 0–0.5)
EOSINOPHIL NFR BLD AUTO: 2.1 % (ref 1–4)
ERYTHROCYTE [DISTWIDTH] IN BLOOD BY AUTOMATED COUNT: 25.7 % (ref 11.5–14.5)
GLUCOSE SERPL-MCNC: 109 MG/DL (ref 70–105)
GLUCOSE SERPL-MCNC: 135 MG/DL (ref 70–105)
GLUCOSE SERPL-MCNC: 68 MG/DL (ref 74–106)
GLUCOSE SERPL-MCNC: 80 MG/DL (ref 70–105)
GLUCOSE SERPL-MCNC: 84 MG/DL (ref 70–105)
HCT VFR BLD AUTO: 38.5 % (ref 40–54)
HGB BLD-MCNC: 10 G/DL (ref 13.5–18)
HYPOCHROMIA BLD QL SMEAR: ABNORMAL
IMM GRANULOCYTES # BLD AUTO: 0.04 K/UL (ref 0–0.04)
IMM GRANULOCYTES NFR BLD: 0.3 % (ref 0–0.4)
LYMPHOCYTES # BLD AUTO: 1.67 K/UL (ref 1–4.8)
LYMPHOCYTES NFR BLD AUTO: 12.6 % (ref 27–41)
MCH RBC QN AUTO: 21.9 PG (ref 27–31)
MCHC RBC AUTO-ENTMCNC: 26 G/DL (ref 32–36)
MCV RBC AUTO: 84.2 FL (ref 80–96)
MONOCYTES # BLD AUTO: 1.14 K/UL (ref 0–0.8)
MONOCYTES NFR BLD AUTO: 8.6 % (ref 2–6)
MPC BLD CALC-MCNC: 11.1 FL (ref 9.4–12.4)
NEUTROPHILS # BLD AUTO: 10.06 K/UL (ref 1.8–7.7)
NEUTROPHILS NFR BLD AUTO: 76.2 % (ref 53–65)
NRBC # BLD AUTO: 0 X10E3/UL
NRBC, AUTO (.00): 0 %
OVALOCYTES BLD QL SMEAR: ABNORMAL
PLATELET # BLD AUTO: 297 K/UL (ref 150–400)
PLATELET MORPHOLOGY: ABNORMAL
POLYCHROMASIA BLD QL SMEAR: ABNORMAL
POTASSIUM SERPL-SCNC: 3 MMOL/L (ref 3.5–5.1)
RBC # BLD AUTO: 4.57 M/UL (ref 4.6–6.2)
SODIUM SERPL-SCNC: 139 MMOL/L (ref 136–145)
STOMATOCYTES BLD QL SMEAR: ABNORMAL
WBC # BLD AUTO: 13.22 K/UL (ref 4.5–11)

## 2023-08-12 PROCEDURE — 25000003 PHARM REV CODE 250: Performed by: NURSE PRACTITIONER

## 2023-08-12 PROCEDURE — 11000001 HC ACUTE MED/SURG PRIVATE ROOM

## 2023-08-12 PROCEDURE — 63600175 PHARM REV CODE 636 W HCPCS: Performed by: NURSE PRACTITIONER

## 2023-08-12 PROCEDURE — 94640 AIRWAY INHALATION TREATMENT: CPT

## 2023-08-12 PROCEDURE — 25000242 PHARM REV CODE 250 ALT 637 W/ HCPCS

## 2023-08-12 PROCEDURE — 94660 CPAP INITIATION&MGMT: CPT

## 2023-08-12 PROCEDURE — 94761 N-INVAS EAR/PLS OXIMETRY MLT: CPT

## 2023-08-12 PROCEDURE — C9113 INJ PANTOPRAZOLE SODIUM, VIA: HCPCS | Performed by: NURSE PRACTITIONER

## 2023-08-12 PROCEDURE — 25000003 PHARM REV CODE 250: Performed by: HOSPITALIST

## 2023-08-12 PROCEDURE — 27000221 HC OXYGEN, UP TO 24 HOURS

## 2023-08-12 PROCEDURE — 94664 DEMO&/EVAL PT USE INHALER: CPT

## 2023-08-12 PROCEDURE — 80048 BASIC METABOLIC PNL TOTAL CA: CPT

## 2023-08-12 PROCEDURE — 25000003 PHARM REV CODE 250: Performed by: INTERNAL MEDICINE

## 2023-08-12 PROCEDURE — 99232 SBSQ HOSP IP/OBS MODERATE 35: CPT | Mod: ,,, | Performed by: INTERNAL MEDICINE

## 2023-08-12 PROCEDURE — 25000242 PHARM REV CODE 250 ALT 637 W/ HCPCS: Performed by: NURSE PRACTITIONER

## 2023-08-12 PROCEDURE — 99232 PR SUBSEQUENT HOSPITAL CARE,LEVL II: ICD-10-PCS | Mod: ,,, | Performed by: INTERNAL MEDICINE

## 2023-08-12 PROCEDURE — 99900035 HC TECH TIME PER 15 MIN (STAT)

## 2023-08-12 PROCEDURE — 27000173 HC ACAPELLA DEVICE DH OR DM

## 2023-08-12 PROCEDURE — 82962 GLUCOSE BLOOD TEST: CPT

## 2023-08-12 PROCEDURE — 85025 COMPLETE CBC W/AUTO DIFF WBC: CPT

## 2023-08-12 RX ORDER — CARVEDILOL 6.25 MG/1
6.25 TABLET ORAL 2 TIMES DAILY WITH MEALS
Status: DISCONTINUED | OUTPATIENT
Start: 2023-08-12 | End: 2023-08-14 | Stop reason: HOSPADM

## 2023-08-12 RX ORDER — PANTOPRAZOLE SODIUM 40 MG/1
40 TABLET, DELAYED RELEASE ORAL
Status: DISCONTINUED | OUTPATIENT
Start: 2023-08-12 | End: 2023-08-14 | Stop reason: HOSPADM

## 2023-08-12 RX ORDER — CHOLECALCIFEROL (VITAMIN D3) 25 MCG
1000 TABLET ORAL DAILY
Status: DISCONTINUED | OUTPATIENT
Start: 2023-08-13 | End: 2023-08-14 | Stop reason: HOSPADM

## 2023-08-12 RX ORDER — LANOLIN ALCOHOL/MO/W.PET/CERES
1 CREAM (GRAM) TOPICAL
Status: DISCONTINUED | OUTPATIENT
Start: 2023-08-13 | End: 2023-08-14 | Stop reason: HOSPADM

## 2023-08-12 RX ORDER — ATORVASTATIN CALCIUM 40 MG/1
40 TABLET, FILM COATED ORAL NIGHTLY
Status: DISCONTINUED | OUTPATIENT
Start: 2023-08-12 | End: 2023-08-14 | Stop reason: HOSPADM

## 2023-08-12 RX ORDER — ALLOPURINOL 300 MG/1
300 TABLET ORAL DAILY
Status: DISCONTINUED | OUTPATIENT
Start: 2023-08-13 | End: 2023-08-14 | Stop reason: HOSPADM

## 2023-08-12 RX ORDER — SPIRONOLACTONE 25 MG/1
25 TABLET ORAL DAILY
Status: DISCONTINUED | OUTPATIENT
Start: 2023-08-12 | End: 2023-08-14 | Stop reason: HOSPADM

## 2023-08-12 RX ADMIN — BUDESONIDE 0.25 MG: 0.25 SUSPENSION RESPIRATORY (INHALATION) at 07:08

## 2023-08-12 RX ADMIN — ALLOPURINOL 300 MG: 300 TABLET ORAL at 09:08

## 2023-08-12 RX ADMIN — IPRATROPIUM BROMIDE AND ALBUTEROL SULFATE 3 ML: 2.5; .5 SOLUTION RESPIRATORY (INHALATION) at 12:08

## 2023-08-12 RX ADMIN — FUROSEMIDE 40 MG: 10 INJECTION, SOLUTION INTRAMUSCULAR; INTRAVENOUS at 09:08

## 2023-08-12 RX ADMIN — SCOPOLAMINE 1 PATCH: 1 SYSTEM TRANSDERMAL at 12:08

## 2023-08-12 RX ADMIN — PANTOPRAZOLE SODIUM 40 MG: 40 INJECTION, POWDER, FOR SOLUTION INTRAVENOUS at 09:08

## 2023-08-12 RX ADMIN — Medication 1000 UNITS: at 09:08

## 2023-08-12 RX ADMIN — THERA TABS 1 TABLET: TAB at 09:08

## 2023-08-12 RX ADMIN — FOLIC ACID 1 MG: 1 TABLET ORAL at 09:08

## 2023-08-12 RX ADMIN — ATORVASTATIN CALCIUM 40 MG: 40 TABLET, FILM COATED ORAL at 08:08

## 2023-08-12 RX ADMIN — Medication 100 MG: at 09:08

## 2023-08-12 RX ADMIN — IPRATROPIUM BROMIDE AND ALBUTEROL SULFATE 3 ML: 2.5; .5 SOLUTION RESPIRATORY (INHALATION) at 07:08

## 2023-08-12 RX ADMIN — GUAIFENESIN 1200 MG: 600 TABLET, EXTENDED RELEASE ORAL at 09:08

## 2023-08-12 RX ADMIN — SPIRONOLACTONE 25 MG: 25 TABLET ORAL at 12:08

## 2023-08-12 RX ADMIN — COLCHICINE 0.6 MG: 0.6 TABLET ORAL at 09:08

## 2023-08-12 RX ADMIN — PANTOPRAZOLE SODIUM 40 MG: 40 TABLET, DELAYED RELEASE ORAL at 04:08

## 2023-08-12 RX ADMIN — CARVEDILOL 6.25 MG: 6.25 TABLET, FILM COATED ORAL at 09:08

## 2023-08-12 RX ADMIN — CARVEDILOL 6.25 MG: 6.25 TABLET, FILM COATED ORAL at 05:08

## 2023-08-12 RX ADMIN — AMLODIPINE BESYLATE 10 MG: 10 TABLET ORAL at 09:08

## 2023-08-12 RX ADMIN — GUAIFENESIN 1200 MG: 600 TABLET, EXTENDED RELEASE ORAL at 08:08

## 2023-08-12 NOTE — ASSESSMENT & PLAN NOTE
Echo    Result Date: 8/5/2023    Left Ventricle: The left ventricle is normal in size. Normal wall   thickness. Normal wall motion. There is normal systolic function with a   visually estimated ejection fraction of 55 - 60%. There is normal   diastolic function.    Left Atrium: Left atrium is mildly dilated.    Right Ventricle: Normal right ventricular cavity size. Wall thickness   is normal. Right ventricle wall motion  is normal. Systolic function is   normal.    Mitral Valve: There is trace regurgitation.    Tricuspid Valve: There is trace regurgitation.    Pulmonic Valve: There is no significant stenosis. The estimated PA   systolic pressure is 34 mmHg.    IVC/SVC: Normal venous pressure at 3 mmHg.        Patient with clinical diagnosis of heart failure on the exam with crackles in the lung and infiltrates on chest xray, distended abd, edema in LE and SOB  Takes lasix 40 mg BID PO at home, continue Lasix 40 mg IV daily here.   Continue Coreg with half of the home dose  Strict I&O  Weight daily   Cardiac monitoring

## 2023-08-12 NOTE — ASSESSMENT & PLAN NOTE
Body mass index is 38.25 kg/m².   Morbid obesity complicates all aspects of disease management from diagnostic modalities to treatment.   Weight loss encouraged and health benefits explained to patient.

## 2023-08-12 NOTE — ASSESSMENT & PLAN NOTE
Received Valium taper and completed.  No active withdrawal now and out of window.  Counseled on cessation of alcohol.  Resume thiamine, MV and folate.

## 2023-08-12 NOTE — ASSESSMENT & PLAN NOTE
"Patient's FSGs are uncontrolled due to hyperglycemia on current medication regimen.  Last A1c reviewed- No results found for: "LABA1C", "HGBA1C"  Most recent fingerstick glucose reviewed- No results for input(s): "POCTGLUCOSE" in the last 24 hours.  Current correctional scale  Medium  Maintain anti-hyperglycemic dose as follows-   Antihyperglycemics (From admission, onward)    Start     Stop Route Frequency Ordered    08/03/23 0400  insulin aspart U-100 injection 1-10 Units         -- SubQ Every 6 hours PRN 08/03/23 0358        Hold Oral hypoglycemics while patient is in the hospital.  "

## 2023-08-12 NOTE — ASSESSMENT & PLAN NOTE
Patient with Hypercapnic and Hypoxic Respiratory failure which is Acute on chronic.  he is on home oxygen at 2 LPM. Supplemental oxygen was provided and noted- Oxygen Concentration (%):  [28-35] 28    .   Signs/symptoms of respiratory failure include- tachypnea and respiratory distress. Contributing diagnoses includes - CHF, COPD and Obesity Hypoventilation Labs and images were reviewed. Patient Has recent ABG, which has been reviewed. Will treat underlying causes and adjust management of respiratory failure as follows-     Intubated on 8/5 for altered mental status, hypercapnia.  Extubated to BIPAP on 8/9, now on 2L NC with BIPAP QHS.  Pulm followed while in ICU.

## 2023-08-12 NOTE — SUBJECTIVE & OBJECTIVE
Interval History: Patient seen and examined at the bedside, reports feeling better, denies any chest pain or shortness of breath.     Review of Systems   All other systems reviewed and are negative.    Objective:     Vital Signs (Most Recent):  Temp: 97.8 °F (36.6 °C) (08/12/23 0630)  Pulse: 84 (08/12/23 0758)  Resp: (!) 24 (08/12/23 0758)  BP: (!) 145/78 (08/12/23 0630)  SpO2: 97 % (08/12/23 0758) Vital Signs (24h Range):  Temp:  [97.4 °F (36.3 °C)-98.4 °F (36.9 °C)] 97.8 °F (36.6 °C)  Pulse:  [67-92] 84  Resp:  [9-29] 24  SpO2:  [89 %-100 %] 97 %  BP: (104-158)/(56-93) 145/78     Weight: 131.5 kg (289 lb 14.5 oz)  Body mass index is 38.25 kg/m².    Intake/Output Summary (Last 24 hours) at 8/12/2023 1121  Last data filed at 8/12/2023 0630  Gross per 24 hour   Intake --   Output 1900 ml   Net -1900 ml         Physical Exam  Vitals and nursing note reviewed.   Constitutional:       Appearance: He is ill-appearing.   HENT:      Head: Normocephalic and atraumatic.      Nose: Nose normal.      Mouth/Throat:      Mouth: Mucous membranes are moist.   Eyes:      Extraocular Movements: Extraocular movements intact.   Cardiovascular:      Rate and Rhythm: Normal rate and regular rhythm.      Pulses: Normal pulses.      Heart sounds: Normal heart sounds.   Pulmonary:      Effort: Pulmonary effort is normal.      Breath sounds: Rales present.   Abdominal:      General: Bowel sounds are normal.      Palpations: Abdomen is soft.   Musculoskeletal:         General: Normal range of motion.      Cervical back: Normal range of motion.      Right lower leg: Edema present.      Left lower leg: Edema present.   Skin:     General: Skin is warm.   Neurological:      General: No focal deficit present.      Mental Status: He is alert and oriented to person, place, and time. Mental status is at baseline.   Psychiatric:         Mood and Affect: Mood normal.         Behavior: Behavior normal.             Significant Labs: All pertinent labs  within the past 24 hours have been reviewed.    Significant Imaging: I have reviewed all pertinent imaging results/findings within the past 24 hours.

## 2023-08-12 NOTE — ASSESSMENT & PLAN NOTE
Hgb ~ 9-10 at baseline.  Suspected due to eating dirt.  S/p EGD on 8/3 which showed PUD, GI concerned that there could be some bleeding from it as well.   Continue iron supplement, Protonix (transitioned to oral on 8/12).   Dropped to 5.7 on admission, now improved to baseline.

## 2023-08-12 NOTE — ASSESSMENT & PLAN NOTE
Currently well controlled  Continue home norvasc, half of coreg home dose  Changed lasix 40 mg BID PO to 40 mg IV daily.   Held Aldactone due to KAREN which seems to be resolved now, Aldactone resumed.

## 2023-08-12 NOTE — ASSESSMENT & PLAN NOTE
Presented with H/H of 5.3/22 with MCV in 70s  Reports have been seeing bright red blood in stool and dark black tarry stools at times  S/p 1 units PRBC on admission.   GI consulted, s/p EGD which showed PUD.  Protonix BID ordered.    Needs screening colonscopy as outpatient.

## 2023-08-12 NOTE — PROGRESS NOTES
[facility]  [facility] Medicine  Progress Note    Patient Name: Abilio Carroll  MRN: [id number]  Patient Class: IP- Inpatient   Admission Date: 8/2/2023  Length of Stay: 9 days  Attending Physician: Juan Carlos Root MD  Primary Care Provider: Brandt Rucker MD        Subjective:     Principal Problem:Acute on chronic respiratory failure        HPI:  62 year old male, extremely a poor historian with PMHx of TERI, COPD and Alcohol abuse presented to the [facility] via EMS due to weakness. Patient reports around four hours ago after having 4 beers and a marijuana joint he started feeling suddenly weak and fatigue. He could not hold any objects in his hand and would drop the food. He couldn't grab a glass of water to drink. He could not ambulate because the right leg was weak. He reports the hands and UE were both weak to the same degree. He reports the entire body was shaky and jumpy.Patient reports he has experienced episodes of this before that would only last a few seconds but now this is the first time it was constant and lasted longer. Patient reports he was feeling dizziness and felt like he is about to pass out. Room was spinning around his head. No ringing in the ear. Vision was complety ok with no deficits. Did not lose conciousness. But he did loss urine without even noticing it. Patient denies sweating, no fever, no chill, no back pain, no chest pain, no abdominal pain. He reports stool is sometimes bloody and sometimes black and has chronic constipation. He has swelling of scrotum, edema in the legs. Chronic SOB and has home oxygen he reports he wears at nights. Currently at spO2 85% on room air and 94% with 3L NC.    Patient is a poor historian, does not know a single medication he takes and has no idea about his medical history. He has HTN. Patient reports he has seen a heart doctor 5 years ago at [facility] but has not seen one since. He has Diabetes and takes 40 units long acting  "in the morning and SSI throughout the day. Had colonoscopy once before but doesn't remember how many years. No heart burns and never had EGD. TERI with CPAP but doesn't wear CPAP.     Current 40 pack year smoking Hx. Smokes marijuana. Drinks alcohol everyday 6-12 drinks per day.     ED course:   H/H 5.3/22  Cr 1.4  proBNP >400      Overview/Hospital Course:  Patient was admitted on 8/3/23 with weakness and noted to have significant iron deficiency anemia which is attributed to his "dirt eating" habit, although he did have EGD on 8/3 which did show PUD, no evidence of bleeding. Patient was noted to have significant change in mental status and lethargy, was placed on BIPAP which failed as CO2 was > 100, he was eventually intubated on 8/5 and transferred to ICU. He received antibiotics, systemic steroids and diuresis. Patient was extubated on 8/9 on BIPAP and transferred to floor on NC on 8/11.        Interval History: Patient seen and examined at the bedside, reports feeling better, denies any chest pain or shortness of breath.     Review of Systems   All other systems reviewed and are negative.    Objective:     Vital Signs (Most Recent):  Temp: 97.8 °F (36.6 °C) (08/12/23 0630)  Pulse: 84 (08/12/23 0758)  Resp: (!) 24 (08/12/23 0758)  BP: (!) 145/78 (08/12/23 0630)  SpO2: 97 % (08/12/23 0758) Vital Signs (24h Range):  Temp:  [97.4 °F (36.3 °C)-98.4 °F (36.9 °C)] 97.8 °F (36.6 °C)  Pulse:  [67-92] 84  Resp:  [9-29] 24  SpO2:  [89 %-100 %] 97 %  BP: (104-158)/(56-93) 145/78     Weight: 131.5 kg (289 lb 14.5 oz)  Body mass index is 38.25 kg/m².    Intake/Output Summary (Last 24 hours) at 8/12/2023 1121  Last data filed at 8/12/2023 0630  Gross per 24 hour   Intake --   Output 1900 ml   Net -1900 ml         Physical Exam  Vitals and nursing note reviewed.   Constitutional:       Appearance: He is ill-appearing.   HENT:      Head: Normocephalic and atraumatic.      Nose: Nose normal.      Mouth/Throat:      Mouth: Mucous " membranes are moist.   Eyes:      Extraocular Movements: Extraocular movements intact.   Cardiovascular:      Rate and Rhythm: Normal rate and regular rhythm.      Pulses: Normal pulses.      Heart sounds: Normal heart sounds.   Pulmonary:      Effort: Pulmonary effort is normal.      Breath sounds: Rales present.   Abdominal:      General: Bowel sounds are normal.      Palpations: Abdomen is soft.   Musculoskeletal:         General: Normal range of motion.      Cervical back: Normal range of motion.      Right lower leg: Edema present.      Left lower leg: Edema present.   Skin:     General: Skin is warm.   Neurological:      General: No focal deficit present.      Mental Status: He is alert and oriented to person, place, and time. Mental status is at baseline.   Psychiatric:         Mood and Affect: Mood normal.         Behavior: Behavior normal.             Significant Labs: All pertinent labs within the past 24 hours have been reviewed.    Significant Imaging: I have reviewed all pertinent imaging results/findings within the past 24 hours.      Assessment/Plan:      * Acute on chronic respiratory failure  Patient with Hypercapnic and Hypoxic Respiratory failure which is Acute on chronic.  he is on home oxygen at 2 LPM. Supplemental oxygen was provided and noted- Oxygen Concentration (%):  [28-35] 28    .   Signs/symptoms of respiratory failure include- tachypnea and respiratory distress. Contributing diagnoses includes - CHF, COPD and Obesity Hypoventilation Labs and images were reviewed. Patient Has recent ABG, which has been reviewed. Will treat underlying causes and adjust management of respiratory failure as follows-     Intubated on 8/5 for altered mental status, hypercapnia.  Extubated to BIPAP on 8/9, now on 2L NC with BIPAP QHS.  Pulm followed while in ICU.    Sleep apnea  Nocturnal BIPAP continued.     COPD (chronic obstructive pulmonary disease)  Completed 5 days of systemic steroids,  "antibiotics.  Continue duo nebs, inhaled steroids.       Obesity hypoventilation syndrome  Body mass index is 38.25 kg/m².   Morbid obesity complicates all aspects of disease management from diagnostic modalities to treatment.   Weight loss encouraged and health benefits explained to patient.         Acute on chronic heart failure  Echo    Result Date: 8/5/2023    Left Ventricle: The left ventricle is normal in size. Normal wall   thickness. Normal wall motion. There is normal systolic function with a   visually estimated ejection fraction of 55 - 60%. There is normal   diastolic function.    Left Atrium: Left atrium is mildly dilated.    Right Ventricle: Normal right ventricular cavity size. Wall thickness   is normal. Right ventricle wall motion  is normal. Systolic function is   normal.    Mitral Valve: There is trace regurgitation.    Tricuspid Valve: There is trace regurgitation.    Pulmonic Valve: There is no significant stenosis. The estimated PA   systolic pressure is 34 mmHg.    IVC/SVC: Normal venous pressure at 3 mmHg.        Patient with clinical diagnosis of heart failure on the exam with crackles in the lung and infiltrates on chest xray, distended abd, edema in LE and SOB  Takes lasix 40 mg BID PO at home, continue Lasix 40 mg IV daily here.   Continue Coreg with half of the home dose  Strict I&O  Weight daily   Cardiac monitoring          Uncontrolled type 2 diabetes mellitus with hyperglycemia  Patient's FSGs are uncontrolled due to hyperglycemia on current medication regimen.  Last A1c reviewed- No results found for: "LABA1C", "HGBA1C"  Most recent fingerstick glucose reviewed- No results for input(s): "POCTGLUCOSE" in the last 24 hours.  Current correctional scale  Medium  Maintain anti-hyperglycemic dose as follows-   Antihyperglycemics (From admission, onward)    Start     Stop Route Frequency Ordered    08/03/23 0400  insulin aspart U-100 injection 1-10 Units         -- SubQ Every 6 " hours PRN 08/03/23 0358        Hold Oral hypoglycemics while patient is in the hospital.    Essential hypertension  Currently well controlled  Continue home norvasc, half of coreg home dose  Changed lasix 40 mg BID PO to 40 mg IV daily.   Held Aldactone due to KAREN which seems to be resolved now, Aldactone resumed.     Stage 2 chronic kidney disease  Baseline Cr 0.9-1, GFR 80.  Stable Cr now.  Had KAREN while in ICU which is resolved now.       Iron deficiency anemia  Hgb ~ 9-10 at baseline.  Suspected due to eating dirt.  S/p EGD on 8/3 which showed PUD, GI concerned that there could be some bleeding from it as well.   Continue iron supplement, Protonix (transitioned to oral on 8/12).   Dropped to 5.7 on admission, now improved to baseline.         Acute blood loss anemia  Presented with H/H of 5.3/22 with MCV in 70s  Reports have been seeing bright red blood in stool and dark black tarry stools at times  S/p 1 units PRBC on admission.   GI consulted, s/p EGD which showed PUD.  Protonix BID ordered.    Needs screening colonscopy as outpatient.        Alcohol abuse  Received Valium taper and completed.  No active withdrawal now and out of window.  Counseled on cessation of alcohol.  Resume thiamine, MV and folate.       Gout  S/p colchicine for acute gouty attack.  Resume home allopurinol.       VTE Risk Mitigation (From admission, onward)         Ordered     Reason for No Pharmacological VTE Prophylaxis  Once        Question:  Reasons:  Answer:  Active Bleeding    08/03/23 0139     IP VTE HIGH RISK PATIENT  Once         08/03/23 0139     Place sequential compression device  Until discontinued         08/03/23 0139                Discharge Planning   TONG: 8/14/2023     Code Status: Full Code   Is the patient medically ready for discharge?: No    Reason for patient still in hospital (select all that apply): Patient trending condition, PT / OT recommendations and Pending disposition  Discharge Plan A: Home, Home with  family                  TYREL DENNIS MD  Department of Hospital Medicine   [facility]

## 2023-08-13 LAB
ANION GAP SERPL CALCULATED.3IONS-SCNC: 8 MMOL/L (ref 7–16)
ANISOCYTOSIS BLD QL SMEAR: ABNORMAL
BASOPHILS # BLD AUTO: 0.03 K/UL (ref 0–0.2)
BASOPHILS NFR BLD AUTO: 0.3 % (ref 0–1)
BUN SERPL-MCNC: 32 MG/DL (ref 7–18)
BUN/CREAT SERPL: 28 (ref 6–20)
CALCIUM SERPL-MCNC: 9.4 MG/DL (ref 8.5–10.1)
CHLORIDE SERPL-SCNC: 98 MMOL/L (ref 98–107)
CO2 SERPL-SCNC: 37 MMOL/L (ref 21–32)
CREAT SERPL-MCNC: 1.16 MG/DL (ref 0.7–1.3)
DIFFERENTIAL METHOD BLD: ABNORMAL
EGFR (NO RACE VARIABLE) (RUSH/TITUS): 72 ML/MIN/1.73M2
EOSINOPHIL # BLD AUTO: 0.27 K/UL (ref 0–0.5)
EOSINOPHIL NFR BLD AUTO: 2.4 % (ref 1–4)
ERYTHROCYTE [DISTWIDTH] IN BLOOD BY AUTOMATED COUNT: 25.5 % (ref 11.5–14.5)
GLUCOSE SERPL-MCNC: 106 MG/DL (ref 70–105)
GLUCOSE SERPL-MCNC: 111 MG/DL (ref 70–105)
GLUCOSE SERPL-MCNC: 113 MG/DL (ref 70–105)
GLUCOSE SERPL-MCNC: 168 MG/DL (ref 70–105)
GLUCOSE SERPL-MCNC: 97 MG/DL (ref 74–106)
HCT VFR BLD AUTO: 35.7 % (ref 40–54)
HGB BLD-MCNC: 9.4 G/DL (ref 13.5–18)
HYPOCHROMIA BLD QL SMEAR: ABNORMAL
IMM GRANULOCYTES # BLD AUTO: 0.03 K/UL (ref 0–0.04)
IMM GRANULOCYTES NFR BLD: 0.3 % (ref 0–0.4)
LYMPHOCYTES # BLD AUTO: 1.36 K/UL (ref 1–4.8)
LYMPHOCYTES NFR BLD AUTO: 12.1 % (ref 27–41)
MCH RBC QN AUTO: 22.3 PG (ref 27–31)
MCHC RBC AUTO-ENTMCNC: 26.3 G/DL (ref 32–36)
MCV RBC AUTO: 84.6 FL (ref 80–96)
MONOCYTES # BLD AUTO: 1.11 K/UL (ref 0–0.8)
MONOCYTES NFR BLD AUTO: 9.9 % (ref 2–6)
MPC BLD CALC-MCNC: 10.8 FL (ref 9.4–12.4)
NEUTROPHILS # BLD AUTO: 8.41 K/UL (ref 1.8–7.7)
NEUTROPHILS NFR BLD AUTO: 75 % (ref 53–65)
NRBC # BLD AUTO: 0 X10E3/UL
NRBC, AUTO (.00): 0 %
PLATELET # BLD AUTO: 308 K/UL (ref 150–400)
PLATELET MORPHOLOGY: ABNORMAL
POTASSIUM SERPL-SCNC: 3.7 MMOL/L (ref 3.5–5.1)
RBC # BLD AUTO: 4.22 M/UL (ref 4.6–6.2)
SODIUM SERPL-SCNC: 139 MMOL/L (ref 136–145)
STOMATOCYTES BLD QL SMEAR: ABNORMAL
WBC # BLD AUTO: 11.21 K/UL (ref 4.5–11)

## 2023-08-13 PROCEDURE — 94660 CPAP INITIATION&MGMT: CPT

## 2023-08-13 PROCEDURE — 94761 N-INVAS EAR/PLS OXIMETRY MLT: CPT

## 2023-08-13 PROCEDURE — 27000173 HC ACAPELLA DEVICE DH OR DM

## 2023-08-13 PROCEDURE — 82962 GLUCOSE BLOOD TEST: CPT

## 2023-08-13 PROCEDURE — 27000221 HC OXYGEN, UP TO 24 HOURS

## 2023-08-13 PROCEDURE — 11000001 HC ACUTE MED/SURG PRIVATE ROOM

## 2023-08-13 PROCEDURE — 80048 BASIC METABOLIC PNL TOTAL CA: CPT

## 2023-08-13 PROCEDURE — 25000003 PHARM REV CODE 250: Performed by: INTERNAL MEDICINE

## 2023-08-13 PROCEDURE — 94664 DEMO&/EVAL PT USE INHALER: CPT

## 2023-08-13 PROCEDURE — 25000242 PHARM REV CODE 250 ALT 637 W/ HCPCS

## 2023-08-13 PROCEDURE — 25000003 PHARM REV CODE 250: Performed by: NURSE PRACTITIONER

## 2023-08-13 PROCEDURE — 94640 AIRWAY INHALATION TREATMENT: CPT

## 2023-08-13 PROCEDURE — 99232 PR SUBSEQUENT HOSPITAL CARE,LEVL II: ICD-10-PCS | Mod: ,,, | Performed by: INTERNAL MEDICINE

## 2023-08-13 PROCEDURE — 99232 SBSQ HOSP IP/OBS MODERATE 35: CPT | Mod: ,,, | Performed by: INTERNAL MEDICINE

## 2023-08-13 PROCEDURE — 99900035 HC TECH TIME PER 15 MIN (STAT)

## 2023-08-13 PROCEDURE — 85025 COMPLETE CBC W/AUTO DIFF WBC: CPT

## 2023-08-13 PROCEDURE — 25000242 PHARM REV CODE 250 ALT 637 W/ HCPCS: Performed by: NURSE PRACTITIONER

## 2023-08-13 RX ORDER — FUROSEMIDE 40 MG/1
40 TABLET ORAL 2 TIMES DAILY
Status: DISCONTINUED | OUTPATIENT
Start: 2023-08-13 | End: 2023-08-14 | Stop reason: HOSPADM

## 2023-08-13 RX ORDER — COLCHICINE 0.6 MG/1
0.6 TABLET, FILM COATED ORAL 2 TIMES DAILY
Status: DISCONTINUED | OUTPATIENT
Start: 2023-08-13 | End: 2023-08-14 | Stop reason: HOSPADM

## 2023-08-13 RX ADMIN — GUAIFENESIN 1200 MG: 600 TABLET, EXTENDED RELEASE ORAL at 11:08

## 2023-08-13 RX ADMIN — CARVEDILOL 6.25 MG: 6.25 TABLET, FILM COATED ORAL at 11:08

## 2023-08-13 RX ADMIN — IPRATROPIUM BROMIDE AND ALBUTEROL SULFATE 3 ML: 2.5; .5 SOLUTION RESPIRATORY (INHALATION) at 07:08

## 2023-08-13 RX ADMIN — CARVEDILOL 6.25 MG: 6.25 TABLET, FILM COATED ORAL at 05:08

## 2023-08-13 RX ADMIN — IPRATROPIUM BROMIDE AND ALBUTEROL SULFATE 3 ML: 2.5; .5 SOLUTION RESPIRATORY (INHALATION) at 12:08

## 2023-08-13 RX ADMIN — Medication 1000 UNITS: at 11:08

## 2023-08-13 RX ADMIN — SPIRONOLACTONE 25 MG: 25 TABLET ORAL at 11:08

## 2023-08-13 RX ADMIN — COLCHICINE 0.6 MG: 0.6 TABLET ORAL at 01:08

## 2023-08-13 RX ADMIN — FOLIC ACID 1 MG: 1 TABLET ORAL at 11:08

## 2023-08-13 RX ADMIN — IPRATROPIUM BROMIDE AND ALBUTEROL SULFATE 3 ML: 2.5; .5 SOLUTION RESPIRATORY (INHALATION) at 01:08

## 2023-08-13 RX ADMIN — FERROUS SULFATE TAB 325 MG (65 MG ELEMENTAL FE) 1 EACH: 325 (65 FE) TAB at 11:08

## 2023-08-13 RX ADMIN — COLCHICINE 0.6 MG: 0.6 TABLET ORAL at 09:08

## 2023-08-13 RX ADMIN — Medication 100 MG: at 10:08

## 2023-08-13 RX ADMIN — FUROSEMIDE 40 MG: 40 TABLET ORAL at 05:08

## 2023-08-13 RX ADMIN — ATORVASTATIN CALCIUM 40 MG: 40 TABLET, FILM COATED ORAL at 09:08

## 2023-08-13 RX ADMIN — ALLOPURINOL 300 MG: 300 TABLET ORAL at 11:08

## 2023-08-13 RX ADMIN — PANTOPRAZOLE SODIUM 40 MG: 40 TABLET, DELAYED RELEASE ORAL at 06:08

## 2023-08-13 RX ADMIN — PANTOPRAZOLE SODIUM 40 MG: 40 TABLET, DELAYED RELEASE ORAL at 03:08

## 2023-08-13 RX ADMIN — Medication 1 TABLET: at 11:08

## 2023-08-13 RX ADMIN — BUDESONIDE 0.25 MG: 0.25 SUSPENSION RESPIRATORY (INHALATION) at 07:08

## 2023-08-13 RX ADMIN — FUROSEMIDE 40 MG: 40 TABLET ORAL at 11:08

## 2023-08-13 RX ADMIN — AMLODIPINE BESYLATE 10 MG: 10 TABLET ORAL at 11:08

## 2023-08-13 RX ADMIN — GUAIFENESIN 1200 MG: 600 TABLET, EXTENDED RELEASE ORAL at 09:08

## 2023-08-13 NOTE — ASSESSMENT & PLAN NOTE
Currently well controlled  Continue home norvasc, half of coreg home dose and Lasix.  Held Aldactone due to KAREN which seems to be resolved now, Aldactone resumed.

## 2023-08-13 NOTE — PLAN OF CARE
Problem: Adult Inpatient Plan of Care  Goal: Plan of Care Review  Outcome: Ongoing, Progressing  Goal: Patient-Specific Goal (Individualized)  Outcome: Ongoing, Progressing  Goal: Absence of Hospital-Acquired Illness or Injury  Outcome: Ongoing, Progressing  Goal: Optimal Comfort and Wellbeing  Outcome: Ongoing, Progressing  Goal: Readiness for Transition of Care  Outcome: Ongoing, Progressing     Problem: Bariatric Environmental Safety  Goal: Safety Maintained with Care  Outcome: Ongoing, Progressing     Problem: Diabetes Comorbidity  Goal: Blood Glucose Level Within Targeted Range  Outcome: Ongoing, Progressing     Problem: Fluid and Electrolyte Imbalance (Acute Kidney Injury/Impairment)  Goal: Fluid and Electrolyte Balance  Outcome: Ongoing, Progressing     Problem: Oral Intake Inadequate (Acute Kidney Injury/Impairment)  Goal: Optimal Nutrition Intake  Outcome: Ongoing, Progressing     Problem: Renal Function Impairment (Acute Kidney Injury/Impairment)  Goal: Effective Renal Function  Outcome: Ongoing, Progressing     Problem: Gas Exchange Impaired  Goal: Optimal Gas Exchange  Outcome: Ongoing, Progressing     Problem: Fall Injury Risk  Goal: Absence of Fall and Fall-Related Injury  Outcome: Ongoing, Progressing     Problem: Impaired Wound Healing  Goal: Optimal Wound Healing  Outcome: Ongoing, Progressing     Problem: Infection  Goal: Absence of Infection Signs and Symptoms  Outcome: Ongoing, Progressing     Problem: Skin Injury Risk Increased  Goal: Skin Health and Integrity  Outcome: Ongoing, Progressing     Problem: Noninvasive Ventilation Acute  Goal: Effective Unassisted Ventilation and Oxygenation  Outcome: Ongoing, Progressing     Problem: Airway Clearance Ineffective  Goal: Effective Airway Clearance  Outcome: Ongoing, Progressing

## 2023-08-13 NOTE — PROGRESS NOTES
[facility]  [facility] Medicine  Progress Note    Patient Name: Abilio Carroll  MRN: [id number]  Patient Class: IP- Inpatient   Admission Date: 8/2/2023  Length of Stay: 10 days  Attending Physician: Juan Carlos Root MD  Primary Care Provider: Brandt Rucker MD        Subjective:     Principal Problem:Acute on chronic respiratory failure        HPI:  62 year old male, extremely a poor historian with PMHx of TERI, COPD and Alcohol abuse presented to the [facility] via EMS due to weakness. Patient reports around four hours ago after having 4 beers and a marijuana joint he started feeling suddenly weak and fatigue. He could not hold any objects in his hand and would drop the food. He couldn't grab a glass of water to drink. He could not ambulate because the right leg was weak. He reports the hands and UE were both weak to the same degree. He reports the entire body was shaky and jumpy.Patient reports he has experienced episodes of this before that would only last a few seconds but now this is the first time it was constant and lasted longer. Patient reports he was feeling dizziness and felt like he is about to pass out. Room was spinning around his head. No ringing in the ear. Vision was complety ok with no deficits. Did not lose conciousness. But he did loss urine without even noticing it. Patient denies sweating, no fever, no chill, no back pain, no chest pain, no abdominal pain. He reports stool is sometimes bloody and sometimes black and has chronic constipation. He has swelling of scrotum, edema in the legs. Chronic SOB and has home oxygen he reports he wears at nights. Currently at spO2 85% on room air and 94% with 3L NC.    Patient is a poor historian, does not know a single medication he takes and has no idea about his medical history. He has HTN. Patient reports he has seen a heart doctor 5 years ago at [place] but has not seen one since. He has Diabetes and takes 40 units long  "acting in the morning and SSI throughout the day. Had colonoscopy once before but doesn't remember how many years. No heart burns and never had EGD. TERI with CPAP but doesn't wear CPAP.     Current 40 pack year smoking Hx. Smokes marijuana. Drinks alcohol everyday 6-12 drinks per day.     ED course:   H/H 5.3/22  Cr 1.4  proBNP >400      Overview/Hospital Course:  Patient was admitted on 8/3/23 with weakness and noted to have significant iron deficiency anemia which is attributed to his "dirt eating" habit, although he did have EGD on 8/3 which did show PUD, no evidence of bleeding. Patient was noted to have significant change in mental status and lethargy, was placed on BIPAP which failed as CO2 was > 100, he was eventually intubated on 8/5 and transferred to ICU. He received antibiotics, systemic steroids and diuresis. Patient was extubated on 8/9 on BIPAP and transferred to floor on NC on 8/11.      8/13: Patient with better mobility, wants to go home with home health, home CPAP is on recall and asking to get new one, has home O2. Also needs walker for home.       Interval History: Patient seen and examined at the bedside, reports feeling better, denies any chest pain or shortness of breath.     Review of Systems   All other systems reviewed and are negative.    Objective:     Vital Signs (Most Recent):  Temp: 98.8 °F (37.1 °C) (08/13/23 1106)  Pulse: 84 (08/13/23 1327)  Resp: 17 (08/13/23 1327)  BP: (!) 141/76 (08/13/23 1106)  SpO2: 96 % (08/13/23 1327) Vital Signs (24h Range):  Temp:  [97.5 °F (36.4 °C)-98.8 °F (37.1 °C)] 98.8 °F (37.1 °C)  Pulse:  [78-90] 84  Resp:  [16-24] 17  SpO2:  [92 %-98 %] 96 %  BP: (126-162)/(60-93) 141/76     Weight: 131.5 kg (289 lb 14.5 oz)  Body mass index is 38.25 kg/m².    Intake/Output Summary (Last 24 hours) at 8/13/2023 1340  Last data filed at 8/13/2023 0315  Gross per 24 hour   Intake --   Output 700 ml   Net -700 ml           Physical Exam  Vitals and nursing note reviewed. "   Constitutional:       Appearance: He is ill-appearing.   HENT:      Head: Normocephalic and atraumatic.      Nose: Nose normal.      Mouth/Throat:      Mouth: Mucous membranes are moist.   Eyes:      Extraocular Movements: Extraocular movements intact.   Cardiovascular:      Rate and Rhythm: Normal rate and regular rhythm.      Pulses: Normal pulses.      Heart sounds: Normal heart sounds.   Pulmonary:      Effort: Pulmonary effort is normal.      Breath sounds: Rales present.   Abdominal:      General: Bowel sounds are normal.      Palpations: Abdomen is soft.   Musculoskeletal:         General: Normal range of motion.      Cervical back: Normal range of motion.      Right lower leg: Edema present.      Left lower leg: Edema present.   Skin:     General: Skin is warm.   Neurological:      General: No focal deficit present.      Mental Status: He is alert and oriented to person, place, and time. Mental status is at baseline.   Psychiatric:         Mood and Affect: Mood normal.         Behavior: Behavior normal.             Significant Labs: All pertinent labs within the past 24 hours have been reviewed.    Significant Imaging: I have reviewed all pertinent imaging results/findings within the past 24 hours.      Assessment/Plan:      * Acute on chronic respiratory failure  Patient with Hypercapnic and Hypoxic Respiratory failure which is Acute on chronic.  he is on home oxygen at 2 LPM. Supplemental oxygen was provided and noted- Oxygen Concentration (%):  [35] 35    .   Signs/symptoms of respiratory failure include- tachypnea and respiratory distress. Contributing diagnoses includes - CHF, COPD and Obesity Hypoventilation Labs and images were reviewed. Patient Has recent ABG, which has been reviewed. Will treat underlying causes and adjust management of respiratory failure as follows-     Intubated on 8/5 for altered mental status, hypercapnia.  Extubated to BIPAP on 8/9, now on 2L NC with BIPAP QHS.  Pulm  "followed while in ICU.    Sleep apnea  Nocturnal BIPAP continued.   Needs new CPAP at home prior to discharge as previous one is on recall.     COPD (chronic obstructive pulmonary disease)  Completed 5 days of systemic steroids, antibiotics.  Continue duo nebs, inhaled steroids.       Obesity hypoventilation syndrome  Body mass index is 38.25 kg/m².   Morbid obesity complicates all aspects of disease management from diagnostic modalities to treatment.   Weight loss encouraged and health benefits explained to patient.         Acute on chronic heart failure  Echo    Result Date: 8/5/2023    Left Ventricle: The left ventricle is normal in size. Normal wall   thickness. Normal wall motion. There is normal systolic function with a   visually estimated ejection fraction of 55 - 60%. There is normal   diastolic function.    Left Atrium: Left atrium is mildly dilated.    Right Ventricle: Normal right ventricular cavity size. Wall thickness   is normal. Right ventricle wall motion  is normal. Systolic function is   normal.    Mitral Valve: There is trace regurgitation.    Tricuspid Valve: There is trace regurgitation.    Pulmonic Valve: There is no significant stenosis. The estimated PA   systolic pressure is 34 mmHg.    IVC/SVC: Normal venous pressure at 3 mmHg.        Patient with clinical diagnosis of heart failure on the exam with crackles in the lung and infiltrates on chest xray, distended abd, edema in LE and SOB  Takes lasix 40 mg BID PO at home, received IV diuresis for first 6 days and then transitioned back to home dose.   Continue Coreg with half of the home dose  Strict I&O  Weight daily   Cardiac monitoring          Uncontrolled type 2 diabetes mellitus with hyperglycemia  Patient's FSGs are uncontrolled due to hyperglycemia on current medication regimen.  Last A1c reviewed- No results found for: "LABA1C", "HGBA1C"  Most recent fingerstick glucose reviewed- No results for input(s): "POCTGLUCOSE" in the " last 24 hours.  Current correctional scale  Medium  Maintain anti-hyperglycemic dose as follows-   Antihyperglycemics (From admission, onward)    Start     Stop Route Frequency Ordered    08/03/23 0400  insulin aspart U-100 injection 1-10 Units         -- SubQ Every 6 hours PRN 08/03/23 0358        Hold Oral hypoglycemics while patient is in the hospital.    Essential hypertension  Currently well controlled  Continue home norvasc, half of coreg home dose and Lasix.  Held Aldactone due to KAREN which seems to be resolved now, Aldactone resumed.     Stage 2 chronic kidney disease  Baseline Cr 0.9-1, GFR 80.  Stable Cr now.  Had KAREN while in ICU which is resolved now.       Iron deficiency anemia  Hgb ~ 9-10 at baseline.  Suspected due to eating dirt.  S/p EGD on 8/3 which showed PUD, GI concerned that there could be some bleeding from it as well.   Continue iron supplement, Protonix (transitioned to oral on 8/12).   Dropped to 5.7 on admission, now improved to baseline.         Acute blood loss anemia  Presented with H/H of 5.3/22 with MCV in 70s  Reports have been seeing bright red blood in stool and dark black tarry stools at times  S/p 1 units PRBC on admission.   GI consulted, s/p EGD which showed PUD.  Protonix BID ordered.    Needs screening colonscopy as outpatient.        Alcohol abuse  Received Valium taper and completed.  No active withdrawal now and out of window.  Counseled on cessation of alcohol.  Resume thiamine, MV and folate.       Gouty arthritis  Continue colchicine for acute attack involving right wrist.   Resume home allopurinol.       VTE Risk Mitigation (From admission, onward)         Ordered     Reason for No Pharmacological VTE Prophylaxis  Once        Question:  Reasons:  Answer:  Active Bleeding    08/03/23 0139     IP VTE HIGH RISK PATIENT  Once         08/03/23 0139     Place sequential compression device  Until discontinued         08/03/23 0139                Discharge Planning   TONG:  8/14/2023     Code Status: Full Code   Is the patient medically ready for discharge?: No    Reason for patient still in hospital (select all that apply): PT / OT recommendations and Pending disposition  Discharge Plan A: Home, Home with family                  TYREL DENNIS MD  Department of Hospital Medicine   [facility]

## 2023-08-13 NOTE — ASSESSMENT & PLAN NOTE
Echo    Result Date: 8/5/2023    Left Ventricle: The left ventricle is normal in size. Normal wall   thickness. Normal wall motion. There is normal systolic function with a   visually estimated ejection fraction of 55 - 60%. There is normal   diastolic function.    Left Atrium: Left atrium is mildly dilated.    Right Ventricle: Normal right ventricular cavity size. Wall thickness   is normal. Right ventricle wall motion  is normal. Systolic function is   normal.    Mitral Valve: There is trace regurgitation.    Tricuspid Valve: There is trace regurgitation.    Pulmonic Valve: There is no significant stenosis. The estimated PA   systolic pressure is 34 mmHg.    IVC/SVC: Normal venous pressure at 3 mmHg.        Patient with clinical diagnosis of heart failure on the exam with crackles in the lung and infiltrates on chest xray, distended abd, edema in LE and SOB  Takes lasix 40 mg BID PO at home, received IV diuresis for first 6 days and then transitioned back to home dose.   Continue Coreg with half of the home dose  Strict I&O  Weight daily   Cardiac monitoring

## 2023-08-13 NOTE — ASSESSMENT & PLAN NOTE
Nocturnal BIPAP continued.   Needs new CPAP at home prior to discharge as previous one is on recall.

## 2023-08-13 NOTE — PLAN OF CARE
Problem: Adult Inpatient Plan of Care  Goal: Plan of Care Review  Outcome: Ongoing, Progressing  Goal: Patient-Specific Goal (Individualized)  Outcome: Ongoing, Progressing  Goal: Absence of Hospital-Acquired Illness or Injury  Outcome: Ongoing, Progressing  Goal: Optimal Comfort and Wellbeing  Outcome: Ongoing, Progressing  Goal: Readiness for Transition of Care  Outcome: Ongoing, Progressing     Problem: Bariatric Environmental Safety  Goal: Safety Maintained with Care  Outcome: Ongoing, Progressing     Problem: Diabetes Comorbidity  Goal: Blood Glucose Level Within Targeted Range  Outcome: Ongoing, Progressing     Problem: Fluid and Electrolyte Imbalance (Acute Kidney Injury/Impairment)  Goal: Fluid and Electrolyte Balance  Outcome: Ongoing, Progressing     Problem: Oral Intake Inadequate (Acute Kidney Injury/Impairment)  Goal: Optimal Nutrition Intake  Outcome: Ongoing, Progressing     Problem: Renal Function Impairment (Acute Kidney Injury/Impairment)  Goal: Effective Renal Function  Outcome: Ongoing, Progressing     Problem: Gas Exchange Impaired  Goal: Optimal Gas Exchange  Outcome: Ongoing, Progressing     Problem: Fall Injury Risk  Goal: Absence of Fall and Fall-Related Injury  Outcome: Ongoing, Progressing  Intervention: Promote Injury-Free Environment  Flowsheets (Taken 8/13/2023 0848)  Safety Promotion/Fall Prevention:   assistive device/personal item within reach   bed alarm set   Fall Risk reviewed with patient/family   Fall Risk signage in place   side rails raised x 2   instructed to call staff for mobility     Problem: Impaired Wound Healing  Goal: Optimal Wound Healing  Outcome: Ongoing, Progressing  Intervention: Promote Wound Healing  Flowsheets (Taken 8/13/2023 3057)  Oral Nutrition Promotion: rest periods promoted  Activity Management: Ambulated -L4     Problem: Infection  Goal: Absence of Infection Signs and Symptoms  Outcome: Ongoing, Progressing  Intervention: Prevent or Manage  Infection  Flowsheets (Taken 8/13/2023 0437)  Isolation Precautions: precautions maintained     Problem: Skin Injury Risk Increased  Goal: Skin Health and Integrity  Outcome: Ongoing, Progressing  Intervention: Promote and Optimize Oral Intake  Flowsheets (Taken 8/13/2023 0437)  Oral Nutrition Promotion: rest periods promoted

## 2023-08-13 NOTE — SUBJECTIVE & OBJECTIVE
Interval History: Patient seen and examined at the bedside, reports feeling better, denies any chest pain or shortness of breath.     Review of Systems   All other systems reviewed and are negative.    Objective:     Vital Signs (Most Recent):  Temp: 98.8 °F (37.1 °C) (08/13/23 1106)  Pulse: 84 (08/13/23 1327)  Resp: 17 (08/13/23 1327)  BP: (!) 141/76 (08/13/23 1106)  SpO2: 96 % (08/13/23 1327) Vital Signs (24h Range):  Temp:  [97.5 °F (36.4 °C)-98.8 °F (37.1 °C)] 98.8 °F (37.1 °C)  Pulse:  [78-90] 84  Resp:  [16-24] 17  SpO2:  [92 %-98 %] 96 %  BP: (126-162)/(60-93) 141/76     Weight: 131.5 kg (289 lb 14.5 oz)  Body mass index is 38.25 kg/m².    Intake/Output Summary (Last 24 hours) at 8/13/2023 1340  Last data filed at 8/13/2023 0315  Gross per 24 hour   Intake --   Output 700 ml   Net -700 ml           Physical Exam  Vitals and nursing note reviewed.   Constitutional:       Appearance: He is ill-appearing.   HENT:      Head: Normocephalic and atraumatic.      Nose: Nose normal.      Mouth/Throat:      Mouth: Mucous membranes are moist.   Eyes:      Extraocular Movements: Extraocular movements intact.   Cardiovascular:      Rate and Rhythm: Normal rate and regular rhythm.      Pulses: Normal pulses.      Heart sounds: Normal heart sounds.   Pulmonary:      Effort: Pulmonary effort is normal.      Breath sounds: Rales present.   Abdominal:      General: Bowel sounds are normal.      Palpations: Abdomen is soft.   Musculoskeletal:         General: Normal range of motion.      Cervical back: Normal range of motion.      Right lower leg: Edema present.      Left lower leg: Edema present.   Skin:     General: Skin is warm.   Neurological:      General: No focal deficit present.      Mental Status: He is alert and oriented to person, place, and time. Mental status is at baseline.   Psychiatric:         Mood and Affect: Mood normal.         Behavior: Behavior normal.             Significant Labs: All pertinent labs  within the past 24 hours have been reviewed.    Significant Imaging: I have reviewed all pertinent imaging results/findings within the past 24 hours.

## 2023-08-13 NOTE — ASSESSMENT & PLAN NOTE
Patient with Hypercapnic and Hypoxic Respiratory failure which is Acute on chronic.  he is on home oxygen at 2 LPM. Supplemental oxygen was provided and noted- Oxygen Concentration (%):  [35] 35    .   Signs/symptoms of respiratory failure include- tachypnea and respiratory distress. Contributing diagnoses includes - CHF, COPD and Obesity Hypoventilation Labs and images were reviewed. Patient Has recent ABG, which has been reviewed. Will treat underlying causes and adjust management of respiratory failure as follows-     Intubated on 8/5 for altered mental status, hypercapnia.  Extubated to BIPAP on 8/9, now on 2L NC with BIPAP QHS.  Pulm followed while in ICU.

## 2023-08-14 VITALS
WEIGHT: 289.88 LBS | OXYGEN SATURATION: 97 % | BODY MASS INDEX: 38.42 KG/M2 | HEART RATE: 87 BPM | HEIGHT: 73 IN | SYSTOLIC BLOOD PRESSURE: 134 MMHG | RESPIRATION RATE: 18 BRPM | DIASTOLIC BLOOD PRESSURE: 75 MMHG | TEMPERATURE: 98 F

## 2023-08-14 LAB
ANION GAP SERPL CALCULATED.3IONS-SCNC: 6 MMOL/L (ref 7–16)
ANISOCYTOSIS BLD QL SMEAR: ABNORMAL
BASOPHILS # BLD AUTO: 0.01 K/UL (ref 0–0.2)
BASOPHILS NFR BLD AUTO: 0.2 % (ref 0–1)
BUN SERPL-MCNC: 31 MG/DL (ref 7–18)
BUN/CREAT SERPL: 31 (ref 6–20)
CALCIUM SERPL-MCNC: 9.3 MG/DL (ref 8.5–10.1)
CHLORIDE SERPL-SCNC: 104 MMOL/L (ref 98–107)
CO2 SERPL-SCNC: 34 MMOL/L (ref 21–32)
CREAT SERPL-MCNC: 1 MG/DL (ref 0.7–1.3)
DIFFERENTIAL METHOD BLD: ABNORMAL
EGFR (NO RACE VARIABLE) (RUSH/TITUS): 86 ML/MIN/1.73M2
EOSINOPHIL # BLD AUTO: 0.13 K/UL (ref 0–0.5)
EOSINOPHIL NFR BLD AUTO: 2.1 % (ref 1–4)
EOSINOPHIL NFR BLD MANUAL: 3 % (ref 1–4)
ERYTHROCYTE [DISTWIDTH] IN BLOOD BY AUTOMATED COUNT: 25 % (ref 11.5–14.5)
GLUCOSE SERPL-MCNC: 102 MG/DL (ref 70–105)
GLUCOSE SERPL-MCNC: 103 MG/DL (ref 70–105)
GLUCOSE SERPL-MCNC: 124 MG/DL (ref 70–105)
GLUCOSE SERPL-MCNC: 97 MG/DL (ref 74–106)
GLUCOSE SERPL-MCNC: 99 MG/DL (ref 70–105)
HCO3 UR-SCNC: 48.9 MMOL/L (ref 21–28)
HCO3 UR-SCNC: 50.9 MMOL/L (ref 21–28)
HCO3 UR-SCNC: 52 MMOL/L (ref 21–28)
HCO3 UR-SCNC: 52 MMOL/L (ref 21–28)
HCO3 UR-SCNC: 54.1 MMOL/L (ref 21–28)
HCO3 UR-SCNC: 54.4 MMOL/L (ref 21–28)
HCT VFR BLD AUTO: 22.9 % (ref 40–54)
HCT VFR BLD AUTO: 37.6 % (ref 40–54)
HGB BLD-MCNC: 6.3 G/DL (ref 13.5–18)
HGB BLD-MCNC: 9.9 G/DL (ref 13.5–18)
HYPOCHROMIA BLD QL SMEAR: ABNORMAL
IMM GRANULOCYTES # BLD AUTO: 0.03 K/UL (ref 0–0.04)
IMM GRANULOCYTES NFR BLD: 0.5 % (ref 0–0.4)
LYMPHOCYTES # BLD AUTO: 0.67 K/UL (ref 1–4.8)
LYMPHOCYTES NFR BLD AUTO: 11 % (ref 27–41)
LYMPHOCYTES NFR BLD MANUAL: 9 % (ref 27–41)
MCH RBC QN AUTO: 23.2 PG (ref 27–31)
MCHC RBC AUTO-ENTMCNC: 27.5 G/DL (ref 32–36)
MCV RBC AUTO: 84.2 FL (ref 80–96)
MONOCYTES # BLD AUTO: 0.58 K/UL (ref 0–0.8)
MONOCYTES NFR BLD AUTO: 9.6 % (ref 2–6)
MONOCYTES NFR BLD MANUAL: 7 % (ref 2–6)
MPC BLD CALC-MCNC: ABNORMAL G/DL
NEUTROPHILS # BLD AUTO: 4.65 K/UL (ref 1.8–7.7)
NEUTROPHILS NFR BLD AUTO: 76.6 % (ref 53–65)
NEUTS SEG NFR BLD MANUAL: 81 % (ref 50–62)
NRBC # BLD AUTO: 0 X10E3/UL
NRBC, AUTO (.00): 0 %
PCO2 BLDA: 109 MMHG (ref 35–48)
PCO2 BLDA: 71 MMHG (ref 35–48)
PCO2 BLDA: 84 MMHG (ref 35–48)
PCO2 BLDA: 88 MMHG (ref 35–48)
PCO2 BLDA: 90 MMHG (ref 35–48)
PCO2 BLDA: 92 MMHG (ref 35–48)
PH SMN: 7.26 [PH] (ref 7.35–7.45)
PH SMN: 7.37 [PH] (ref 7.35–7.45)
PH SMN: 7.37 [PH] (ref 7.35–7.45)
PH SMN: 7.38 [PH] (ref 7.35–7.45)
PH SMN: 7.4 [PH] (ref 7.35–7.45)
PH SMN: 7.49 [PH] (ref 7.35–7.45)
PLATELET # BLD AUTO: 64 K/UL (ref 150–400)
PLATELET MORPHOLOGY: ABNORMAL
PO2 BLDA: 36 MMHG (ref 83–108)
PO2 BLDA: 51 MMHG (ref 83–108)
PO2 BLDA: 71 MMHG (ref 83–108)
PO2 BLDA: 78 MMHG (ref 83–108)
PO2 BLDA: 81 MMHG (ref 83–108)
PO2 BLDA: 92 MMHG (ref 83–108)
POC BASE EXCESS: 17 MMOL/L (ref -2–3)
POC BASE EXCESS: 20.9 MMOL/L (ref -2–3)
POC BASE EXCESS: 21.8 MMOL/L (ref -2–3)
POC BASE EXCESS: 22.5 MMOL/L (ref -2–3)
POC BASE EXCESS: 24 MMOL/L (ref -2–3)
POC BASE EXCESS: 26.1 MMOL/L (ref -2–3)
POC SATURATED O2: 69 % (ref 95–98)
POC SATURATED O2: 80 % (ref 95–98)
POC SATURATED O2: 95 % (ref 95–98)
POC SATURATED O2: 95 % (ref 95–98)
POC SATURATED O2: 96 % (ref 95–98)
POC SATURATED O2: 97 % (ref 95–98)
POTASSIUM SERPL-SCNC: 3.8 MMOL/L (ref 3.5–5.1)
RBC # BLD AUTO: 2.72 M/UL (ref 4.6–6.2)
SODIUM SERPL-SCNC: 140 MMOL/L (ref 136–145)
WBC # BLD AUTO: 6.07 K/UL (ref 4.5–11)

## 2023-08-14 PROCEDURE — 82962 GLUCOSE BLOOD TEST: CPT

## 2023-08-14 PROCEDURE — 94668 MNPJ CHEST WALL SBSQ: CPT

## 2023-08-14 PROCEDURE — 80048 BASIC METABOLIC PNL TOTAL CA: CPT

## 2023-08-14 PROCEDURE — 25000003 PHARM REV CODE 250: Performed by: INTERNAL MEDICINE

## 2023-08-14 PROCEDURE — 25000242 PHARM REV CODE 250 ALT 637 W/ HCPCS: Performed by: NURSE PRACTITIONER

## 2023-08-14 PROCEDURE — 25000003 PHARM REV CODE 250: Performed by: NURSE PRACTITIONER

## 2023-08-14 PROCEDURE — 99900035 HC TECH TIME PER 15 MIN (STAT)

## 2023-08-14 PROCEDURE — 85025 COMPLETE CBC W/AUTO DIFF WBC: CPT

## 2023-08-14 PROCEDURE — 85014 HEMATOCRIT: CPT | Performed by: INTERNAL MEDICINE

## 2023-08-14 PROCEDURE — 97110 THERAPEUTIC EXERCISES: CPT

## 2023-08-14 PROCEDURE — 94660 CPAP INITIATION&MGMT: CPT

## 2023-08-14 PROCEDURE — 27000221 HC OXYGEN, UP TO 24 HOURS

## 2023-08-14 PROCEDURE — 1111F DSCHRG MED/CURRENT MED MERGE: CPT | Mod: CPTII,,, | Performed by: INTERNAL MEDICINE

## 2023-08-14 PROCEDURE — 94640 AIRWAY INHALATION TREATMENT: CPT

## 2023-08-14 PROCEDURE — 97116 GAIT TRAINING THERAPY: CPT

## 2023-08-14 PROCEDURE — 94761 N-INVAS EAR/PLS OXIMETRY MLT: CPT

## 2023-08-14 PROCEDURE — 25000242 PHARM REV CODE 250 ALT 637 W/ HCPCS

## 2023-08-14 PROCEDURE — 99239 PR HOSPITAL DISCHARGE DAY,>30 MIN: ICD-10-PCS | Mod: ,,, | Performed by: INTERNAL MEDICINE

## 2023-08-14 PROCEDURE — 1111F PR DISCHARGE MEDS RECONCILED W/ CURRENT OUTPATIENT MED LIST: ICD-10-PCS | Mod: CPTII,,, | Performed by: INTERNAL MEDICINE

## 2023-08-14 PROCEDURE — 99239 HOSP IP/OBS DSCHRG MGMT >30: CPT | Mod: ,,, | Performed by: INTERNAL MEDICINE

## 2023-08-14 RX ORDER — PANTOPRAZOLE SODIUM 40 MG/1
40 TABLET, DELAYED RELEASE ORAL
Qty: 60 TABLET | Refills: 1 | Status: SHIPPED | OUTPATIENT
Start: 2023-08-14 | End: 2023-10-13

## 2023-08-14 RX ORDER — INSULIN GLARGINE 300 U/ML
20 INJECTION, SOLUTION SUBCUTANEOUS DAILY
Start: 2023-08-14

## 2023-08-14 RX ORDER — FOLIC ACID 1 MG/1
1 TABLET ORAL DAILY
Qty: 360 TABLET | Refills: 0 | Status: SHIPPED | OUTPATIENT
Start: 2023-08-15 | End: 2024-08-14

## 2023-08-14 RX ORDER — CARVEDILOL 6.25 MG/1
6.25 TABLET ORAL 2 TIMES DAILY WITH MEALS
Qty: 60 TABLET | Refills: 11
Start: 2023-08-14 | End: 2024-08-13

## 2023-08-14 RX ORDER — LANOLIN ALCOHOL/MO/W.PET/CERES
100 CREAM (GRAM) TOPICAL DAILY
Qty: 30 TABLET | Refills: 11 | Status: SHIPPED | OUTPATIENT
Start: 2023-08-15 | End: 2024-08-14

## 2023-08-14 RX ADMIN — IPRATROPIUM BROMIDE AND ALBUTEROL SULFATE 3 ML: 2.5; .5 SOLUTION RESPIRATORY (INHALATION) at 07:08

## 2023-08-14 RX ADMIN — FUROSEMIDE 40 MG: 40 TABLET ORAL at 08:08

## 2023-08-14 RX ADMIN — IPRATROPIUM BROMIDE AND ALBUTEROL SULFATE 3 ML: 2.5; .5 SOLUTION RESPIRATORY (INHALATION) at 12:08

## 2023-08-14 RX ADMIN — PANTOPRAZOLE SODIUM 40 MG: 40 TABLET, DELAYED RELEASE ORAL at 05:08

## 2023-08-14 RX ADMIN — ALLOPURINOL 300 MG: 300 TABLET ORAL at 08:08

## 2023-08-14 RX ADMIN — AMLODIPINE BESYLATE 10 MG: 10 TABLET ORAL at 08:08

## 2023-08-14 RX ADMIN — CARVEDILOL 6.25 MG: 6.25 TABLET, FILM COATED ORAL at 04:08

## 2023-08-14 RX ADMIN — PANTOPRAZOLE SODIUM 40 MG: 40 TABLET, DELAYED RELEASE ORAL at 04:08

## 2023-08-14 RX ADMIN — FERROUS SULFATE TAB 325 MG (65 MG ELEMENTAL FE) 1 EACH: 325 (65 FE) TAB at 08:08

## 2023-08-14 RX ADMIN — CARVEDILOL 6.25 MG: 6.25 TABLET, FILM COATED ORAL at 08:08

## 2023-08-14 RX ADMIN — FOLIC ACID 1 MG: 1 TABLET ORAL at 08:08

## 2023-08-14 RX ADMIN — SPIRONOLACTONE 25 MG: 25 TABLET ORAL at 08:08

## 2023-08-14 RX ADMIN — Medication 1000 UNITS: at 08:08

## 2023-08-14 RX ADMIN — Medication 1 TABLET: at 08:08

## 2023-08-14 RX ADMIN — BUDESONIDE 0.25 MG: 0.25 SUSPENSION RESPIRATORY (INHALATION) at 07:08

## 2023-08-14 RX ADMIN — COLCHICINE 0.6 MG: 0.6 TABLET ORAL at 08:08

## 2023-08-14 RX ADMIN — Medication 100 MG: at 08:08

## 2023-08-14 NOTE — PT/OT/SLP PROGRESS
Occupational Therapy   Treatment    Name: Abilio Carroll  MRN: [id number]  Admitting Diagnosis:  Acute on chronic respiratory failure       Recommendations:     Discharge Recommendations: rehabilitation facility, nursing facility, skilled  Discharge Equipment Recommendations:  walker, rolling  Barriers to discharge:       Assessment:     Abilio Carroll is a 62 y.o. male with a medical diagnosis of Acute on chronic respiratory failure.  He presents with weakness and decline in ADLs. Performance deficits affecting function are weakness, impaired endurance, impaired self care skills, impaired functional mobility, gait instability, impaired balance, decreased coordination, decreased upper extremity function, decreased lower extremity function.     Rehab Prognosis:  Good; patient would benefit from acute skilled OT services to address these deficits and reach maximum level of function.       Plan:     Patient to be seen 5 x/week to address the above listed problems via self-care/home management, therapeutic activities, therapeutic exercises  Plan of Care Expires: 08/24/23  Plan of Care Reviewed with: patient    Subjective     Chief Complaint: weakness  Patient/Family Comments/goals: pt agreeable to OT tx  Pain/Comfort:  Pain Rating 1: 8/10  Location - Side 1: Right  Location 1: wrist    Objective:     Communicated with: HERMINIO Stinson prior to session.  Patient found HOB elevated with peripheral IV, telemetry upon OT entry to room.    General Precautions: Standard, fall    Orthopedic Precautions:N/A  Braces: N/A  Respiratory Status: Room air     Occupational Performance:     Bed Mobility:    Not performed     Functional Mobility/Transfers:  Not performed    Activities of Daily Living:  Not performed      [facility] 6 Click ADL:      Treatment & Education:  Pt performed x 15 reps each LUE bicep curls 2#db, chest press 2#db, shoulder flexion 2#db, tricep press green tband; RUE AROM shoulder flexion, elbow flexion/extension, and chest  press in order to improve BUE strength and endurance to perform ADL and ADL t/f with less assist.     Patient left supine with all lines intact and call button in reach    GOALS:   Multidisciplinary Problems       Occupational Therapy Goals          Problem: Occupational Therapy    Goal Priority Disciplines Outcome Interventions   Occupational Therapy Goal     OT, PT/OT Ongoing, Progressing    Description: STG: (in 2 weeks)  Pt will perform grooming with setup  Pt will bathe with mod(A)  Pt will perform UE dressing with setup and min(A)  Pt will perform LE dressing with moderate (A)  Pt will sit EOB x 7 min with SBA  Pt will transfer bed/chair/bsc with min(A) with RW  Pt will perform standing task x 3 min with CGA with RW  Pt will tolerate 15 minutes of tx without fatigue      LTG: (in 4 weeks)  1.Restore to max I with self care and mobility.                          Time Tracking:     OT Date of Treatment: 08/14/23  OT Start Time: 1449  OT Stop Time: 1505  OT Total Time (min): 16 min    Billable Minutes:Therapeutic Exercise 15 minutes    OT/ZEINA: OT          8/14/2023

## 2023-08-14 NOTE — ASSESSMENT & PLAN NOTE
Presented with H/H of 5.3/22 with MCV in 70s  Reports have been seeing bright red blood in stool and dark black tarry stools at times  S/p 1 units PRBC on admission.   GI consulted, s/p EGD which showed PUD.  Protonix BID ordered.    Needs screening colonscopy as outpatient.  Lab error noted with Hgb down to 6.6, repeat after a few hours was 9.9 without intervention.

## 2023-08-14 NOTE — DISCHARGE SUMMARY
[facility]  [facility] Medicine  Discharge Summary      Patient Name: Abilio Carroll  MRN: [id number]  LINSEY: [id number]  Patient Class: IP- Inpatient  Admission Date: 8/2/2023  Hospital Length of Stay: 11 days  Discharge Date and Time:  08/14/2023 2:56 PM  Attending Physician: Juan Carlos Root MD   Discharging Provider: JUAN CARLOS ROOT MD  Primary Care Provider: Brandt Rucker MD    Primary Care Team: Networked reference to record PCT     HPI:   62 year old male, extremely a poor historian with PMHx of TERI, COPD and Alcohol abuse [facility] via [facility] due to weakness. Patient reports around four hours ago after having 4 beers and a marijuana joint he started feeling suddenly weak and fatigue. He could not hold any objects in his hand and would drop the food. He couldn't grab a glass of water to drink. He could not ambulate because the right leg was weak. He reports the hands and UE were both weak to the same degree. He reports the entire body was shaky and jumpy.Patient reports he has experienced episodes of this before that would only last a few seconds but now this is the first time it was constant and lasted longer. Patient reports he was feeling dizziness and felt like he is about to pass out. Room was spinning around his head. No ringing in the ear. Vision was complety ok with no deficits. Did not lose conciousness. But he did loss urine without even noticing it. Patient denies sweating, no fever, no chill, no back pain, no chest pain, no abdominal pain. He reports stool is sometimes bloody and sometimes black and has chronic constipation. He has swelling of scrotum, edema in the legs. Chronic SOB and has home oxygen he reports he wears at nights. Currently at spO2 85% on room air and 94% with 3L NC.    Patient is a poor historian, does not know a single medication he takes and has no idea about his medical history. He has HTN. Patient reports he has seen a heart doctor 5 years  "ago at [facility] but has not seen one since. He has Diabetes and takes 40 units long acting in the morning and SSI throughout the day. Had colonoscopy once before but doesn't remember how many years. No heart burns and never had EGD. TERI with CPAP but doesn't wear CPAP.     Current 40 pack year smoking Hx. Smokes marijuana. Drinks alcohol everyday 6-12 drinks per day.     ED course:   H/H 5.3/22  Cr 1.4  proBNP >400      * No surgery found *      Hospital Course:   Patient was admitted on 8/3/23 with weakness and noted to have significant iron deficiency anemia which is attributed to his "dirt eating" habit, although he did have EGD on 8/3 which did show PUD, no evidence of bleeding. Patient was noted to have significant change in mental status and lethargy, was placed on BIPAP which failed as CO2 was > 100, he was eventually intubated on 8/5 and transferred to ICU. He received antibiotics, systemic steroids and diuresis. Patient was extubated on 8/9 on BIPAP and transferred to floor on NC on 8/11.      8/13: Patient with better mobility, wants to go home with home health, home BIPAP is on recall and asking to get new one, has home O2. Also needs walker for home.     8/14: Home BIPAP issue resolved, home health and Rolator arranged.        Goals of Care Treatment Preferences:  Code Status: Full Code      Consults:   Consults (From admission, onward)        Status Ordering Provider     Inpatient consult to Social Work  Once        Provider:  (Not yet assigned)    Completed TYREL DENNIS     Inpatient consult to Social Work  Once        Provider:  (Not yet assigned)    Completed TYREL DENNIS     Inpatient consult to Gastroenterology  Once        Provider:  Wayne Wang MD    Completed DELONTE TREJO          Psychiatric  Eating dirt  Counseled against it.       Pulmonary  * Acute on chronic respiratory failure  Patient with Hypercapnic and Hypoxic Respiratory failure which is Acute on chronic.  he is on " home oxygen at 2 LPM. Supplemental oxygen was provided and noted- Oxygen Concentration (%):  [28-35] 28    .   Signs/symptoms of respiratory failure include- tachypnea and respiratory distress. Contributing diagnoses includes - CHF, COPD and Obesity Hypoventilation Labs and images were reviewed. Patient Has recent ABG, which has been reviewed. Will treat underlying causes and adjust management of respiratory failure as follows-     Intubated on 8/5 for altered mental status, hypercapnia.  Extubated to BIPAP on 8/9, now on 2L NC with BIPAP QHS.  Pulm followed while in ICU.    Obesity hypoventilation syndrome  Body mass index is 38.25 kg/m².   Morbid obesity complicates all aspects of disease management from diagnostic modalities to treatment.   Weight loss encouraged and health benefits explained to patient.         COPD (chronic obstructive pulmonary disease)  Completed 5 days of systemic steroids, antibiotics.  Continue duo nebs, inhaled steroids.       Cardiac/Vascular  Essential hypertension  Currently well controlled  Continue home norvasc, half of coreg home dose and Lasix.  Held Aldactone due to KAREN which seems to be resolved now, Aldactone resumed.     Acute on chronic heart failure  Echo    Result Date: 8/5/2023    Left Ventricle: The left ventricle is normal in size. Normal wall   thickness. Normal wall motion. There is normal systolic function with a   visually estimated ejection fraction of 55 - 60%. There is normal   diastolic function.    Left Atrium: Left atrium is mildly dilated.    Right Ventricle: Normal right ventricular cavity size. Wall thickness   is normal. Right ventricle wall motion  is normal. Systolic function is   normal.    Mitral Valve: There is trace regurgitation.    Tricuspid Valve: There is trace regurgitation.    Pulmonic Valve: There is no significant stenosis. The estimated PA   systolic pressure is 34 mmHg.    IVC/SVC: Normal venous pressure at 3 mmHg.        Patient with  "clinical diagnosis of heart failure on the exam with crackles in the lung and infiltrates on chest xray, distended abd, edema in LE and SOB  Takes lasix 40 mg BID PO at home, received IV diuresis for first 6 days and then transitioned back to home dose.   Continue Coreg with half of the home dose  Strict I&O  Weight daily   Cardiac monitoring          Renal/  Stage 2 chronic kidney disease  Baseline Cr 0.9-1, GFR 80.  Stable Cr now.  Had KAREN while in ICU which is resolved now.       Oncology  Acute blood loss anemia  Presented with H/H of 5.3/22 with MCV in 70s  Reports have been seeing bright red blood in stool and dark black tarry stools at times  S/p 1 units PRBC on admission.   GI consulted, s/p EGD which showed PUD.  Protonix BID ordered.    Needs screening colonscopy as outpatient.  Lab error noted with Hgb down to 6.6, repeat after a few hours was 9.9 without intervention.         Iron deficiency anemia  Hgb ~ 9-10 at baseline.  Suspected due to eating dirt.  S/p EGD on 8/3 which showed PUD, GI concerned that there could be some bleeding from it as well.   Continue iron supplement, Protonix (transitioned to oral on 8/12).   Dropped to 5.7 on admission, now improved to baseline.         Endocrine  Severe obesity (BMI >= 40)  Body mass index is 38.25 kg/m². Morbid obesity complicates all aspects of disease management from diagnostic modalities to treatment. Weight loss encouraged and health benefits explained to patient.         Uncontrolled type 2 diabetes mellitus with hyperglycemia  Patient's FSGs are uncontrolled due to hyperglycemia on current medication regimen.  Last A1c reviewed- No results found for: "LABA1C", "HGBA1C"  Most recent fingerstick glucose reviewed- No results for input(s): "POCTGLUCOSE" in the last 24 hours.  Current correctional scale  Medium  Maintain anti-hyperglycemic dose as follows-   Antihyperglycemics (From admission, onward)    Start     Stop Route Frequency Ordered    08/03/23 " 0400  insulin aspart U-100 injection 1-10 Units         -- SubQ Every 6 hours PRN 08/03/23 0358        Hold Oral hypoglycemics while patient is in the hospital.  Reduce home insulin to half the dose.     Orthopedic  Gouty arthritis  Continue colchicine for acute attack involving right wrist.   Resume home allopurinol.     Other  Sleep apnea  Nocturnal BIPAP continued.   SW assisted with home BIPAP issue which was unclean.     Final Active Diagnoses:    Diagnosis Date Noted POA    PRINCIPAL PROBLEM:  Acute on chronic respiratory failure [J96.20] 08/04/2023 Yes    Sleep apnea [G47.30] 10/07/2022 Yes    COPD (chronic obstructive pulmonary disease) [J44.9] 10/03/2022 Yes    Obesity hypoventilation syndrome [E66.2] 08/12/2023 Unknown    Acute on chronic heart failure [I50.9] 10/03/2022 Yes    Uncontrolled type 2 diabetes mellitus with hyperglycemia [E11.65] 07/15/2021 Yes    Essential hypertension [I10] 07/15/2021 Yes    Stage 2 chronic kidney disease [N18.2] 10/03/2022 Yes    Iron deficiency anemia [D50.9] 08/04/2023 Yes    Acute blood loss anemia [D62] 08/03/2023 Yes    Alcohol abuse [F10.10] 08/03/2023 Yes    Gouty arthritis [M10.9] 10/05/2022 Yes    Eating dirt [F50.89] 08/04/2023 Yes    Severe obesity (BMI >= 40) [E66.01] 10/05/2022 Yes      Problems Resolved During this Admission:    Diagnosis Date Noted Date Resolved POA    KAREN (acute kidney injury) [N17.9] 08/03/2023 08/12/2023 Yes       Discharged Condition: fair    Disposition: [facility]-Health Care [facility]    Follow Up:   Contact information for follow-up providers     Brandt Rucker MD. Schedule an appointment as soon as possible for a visit in 1 week(s).    Specialty: Family Medicine  Contact information:  [address] [address]  [place] [address]  [phone]                   Contact information for after-discharge care     Home Medical Care     [facility] - .    Service: Home Health Services  Contact information:  [address]  [address] [facility] [address]  [phone]                             Patient Instructions:   No discharge procedures on file.    Significant Diagnostic Studies: Labs:   CBC   Recent Labs   Lab 08/13/23  0507 08/14/23  0520 08/14/23  0904   WBC 11.21* 6.07  --    HGB 9.4* 6.3* 9.9*   HCT 35.7* 22.9* 37.6*    64*  --        Pending Diagnostic Studies:     Procedure Component Value Units Date/Time    EXTRA TUBES [[id number]] Collected: 08/06/23 1124    Order Status: Sent Lab Status: In process Updated: 08/06/23 1135    Specimen: Blood, Venous     Narrative:      The following orders were created for panel order EXTRA TUBES.  Procedure                               Abnormality         Status                     ---------                               -----------         ------                     Light Green Top Hold[[id number]]                             In process                   Please view results for these tests on the individual orders.    EXTRA TUBES [[id number]] Collected: 08/03/23 0756    Order Status: Sent Lab Status: In process Updated: 08/03/23 0756    Specimen: Blood, Venous     Narrative:      The following orders were created for panel order EXTRA TUBES.  Procedure                               Abnormality         Status                     ---------                               -----------         ------                     Red Top Hold[[id number]]                                     In process                 Light Green Top Hold[[id number]]                             In process                   Please view results for these tests on the individual orders.         Medications:  Reconciled Home Medications:      Medication List      START taking these medications    folic acid 1 MG tablet  Commonly known as: FOLVITE  Take 1 tablet (1 mg total) by mouth once daily.  Start taking on: August 15, 2023     pantoprazole 40 MG tablet  Commonly known as: PROTONIX  Take 1 tablet (40 mg total) by  mouth 2 (two) times daily before meals.     thiamine 100 MG tablet  Take 1 tablet (100 mg total) by mouth once daily.  Start taking on: August 15, 2023        CHANGE how you take these medications    carvediloL 6.25 MG tablet  Commonly known as: COREG  Take 1 tablet (6.25 mg total) by mouth 2 (two) times daily with meals.  What changed:   · medication strength  · how much to take        CONTINUE taking these medications    ACCU-CHEK LAILA CONTROL SOLN Soln  Generic drug: blood glucose control high,low     albuterol 90 mcg/actuation inhaler  Commonly known as: PROVENTIL/VENTOLIN HFA  Inhale 2 puffs into the lungs every 6 (six) hours as needed for Wheezing. Rescue     albuterol-ipratropium 2.5 mg-0.5 mg/3 mL nebulizer solution  Commonly known as: DUO-NEB  INHALE THE CONTENTS OF 1 VIAL VIA NEBULIZER EVERY 6 HOURS AS NEEDED FOR WHEEZING. RESCUE Strength: 0.5 MG-3 MG(2.5 mg base)/3 mL     allopurinoL 100 MG tablet  Commonly known as: ZYLOPRIM  Take 100 mg by mouth once daily.     amLODIPine 10 MG tablet  Commonly known as: NORVASC  Take 1 tablet (10 mg total) by mouth once daily.     ascorbate calcium (vitamin C) 500 mg Tab  Take 1 tablet by mouth once daily.     aspirin 81 MG Chew  Take 81 mg by mouth once daily.     atorvastatin 40 MG tablet  Commonly known as: LIPITOR  Take 40 mg by mouth once daily.     BLOOD GLUCOSE TEST Strp  Generic drug: blood sugar diagnostic  by Misc.(Non-Drug; Combo Route) route.     cholecalciferol (vitamin D3) 25 mcg (1,000 unit) capsule  Commonly known as: VITAMIN D3  Take 1,000 Units by mouth once daily.     colchicine 0.6 mg tablet  Commonly known as: COLCRYS  Take 0.6 mg by mouth once daily.     cyanocobalamin 1000 MCG tablet  Commonly known as: VITAMIN B-12  Take 100 mcg by mouth once daily.     ferrous sulfate 325 mg (65 mg iron) Tab tablet  Commonly known as: FEOSOL  Take 325 mg by mouth daily with breakfast.     furosemide 40 MG tablet  Commonly known as: LASIX  Take 1 tablet (40 mg  total) by mouth 2 (two) times daily.     indomethacin 50 MG capsule  Commonly known as: INDOCIN  TAKE 1 CAPSULE THREE TIMES DAILY AS NEEDED FOR GOUT (DO NOT TAKE FOR MORE THAN 5 DAYS AT A TIME) Strength: 50 mg      HOLD THIS MEDICATION UNTIL YOUR FOLLOW UP WITH YOUR PCP     insulin aspart U-100 100 unit/mL (3 mL) Inpn pen  Commonly known as: NovoLOG  4 units in the morning before breakfast   If sugar is above 150 inject 4 more units in the PM. SQ     SITagliptin phosphate 50 MG Tab  Commonly known as: JANUVIA  Take 50 mg by mouth every morning.     spironolactone 25 MG tablet  Commonly known as: ALDACTONE  Take 25 mg by mouth once daily.     TOUJEO MAX U-300 SOLOSTAR 300 unit/mL (3 mL) insulin pen  Generic drug: insulin glargine U-300 conc  Inject 40 Units into the skin once daily.     TRUE METRIX AIR GLUCOSE METER kit  Generic drug: blood-glucose meter     TRUEPLUS LANCETS 33 gauge Misc  Generic drug: lancets        STOP taking these medications    ibuprofen 200 MG tablet  Commonly known as: ADVIL,MOTRIN            Indwelling Lines/Drains at time of discharge:   Lines/Drains/Airways     None                 Time spent on the discharge of patient: 35 minutes         TYREL DENNIS MD  Department of Hospital Medicine  [facility]

## 2023-08-14 NOTE — PLAN OF CARE
Spoke with Dr. Root and pt ready for dc today but needs a working cpap for home use, also consult for rollator/hh, spoke with patient and his cpap may have been recalled and also mentioned something being wrong with a filter, he rec'd from [facility], ss called loni at [facility] and she is checking on rollator for pt, pt/family must call and speak with Loni about bipap that pt rec'd in 2020, ss called pt's sister per pt's request and she is contacting [facility], choice from pt for [facility], notified andreas with [facility], following

## 2023-08-14 NOTE — PLAN OF CARE
[facility]  Discharge Final Note    Primary Care Provider: Brandt Rucker MD    Expected Discharge Date: 8/14/2023    Final Discharge Note (most recent)       Final Note - 08/14/23 1449          Final Note    Assessment Type Final Discharge Note     Anticipated Discharge Disposition Home-Health Care Svc        Post-Acute Status    Post-Acute Authorization Home Health;HME     HME Status Set-up Complete/Auth obtained     Home Health Status Set-up Complete/Auth obtained     Discharge Delays None known at this time                     Important Message from Medicare             After-discharge care                Home Medical Care       [facility] [place]   Service: Home Health Services    [address]  [address]  [place] [address]   Phone: [phone]                   Rec'd call back from shan at the medical store, they were able to replace a seal and the water trap on pt's bipap and it is working properly, rollator has been delivered to pt's room, notified andreas with [facility] of dc home today, ss left  for pt's sister Rupa and let her know he is for dc home

## 2023-08-14 NOTE — PT/OT/SLP PROGRESS
"Physical Therapy Treatment    Patient Name:  Abilio Carroll   MRN:  [id number]    Recommendations:     Discharge Recommendations: home with home health, home health PT, nursing facility, skilled  Discharge Equipment Recommendations:  (sister obtaining a wider rollator for patient)  Barriers to discharge:  ongoing medical treatment    Assessment:     Abilio Carroll is a 62 y.o. male admitted with a medical diagnosis of Acute on chronic respiratory failure.  He presents with the following impairments/functional limitations: weakness, impaired endurance, impaired self care skills, impaired functional mobility, gait instability, impaired balance, decreased ROM, edema, pain, decreased upper extremity function, impaired cardiopulmonary response to activity .    Patient c/o increased right hand/wrist pain due to acute gout flare-up which he reports started 2-3 days ago. Patient unable to put much pressure on R hand using the walker but still able to ambulate without assistance. Patient reports he is awaiting a new CPAP (his is on the recall) prior to d/c home.     Rehab Prognosis: Good; patient would benefit from acute skilled PT services to address these deficits and reach maximum level of function.    Recent Surgery: * No surgery found *      Plan:     During this hospitalization, patient to be seen 5 x/week to address the identified rehab impairments via gait training, therapeutic activities, therapeutic exercises and progress toward the following goals:    Plan of Care Expires:  09/11/23    Subjective     Chief Complaint: resp failure; new onset gout flare up right hand/wrist  Patient/Family Comments/goals: "This gout got started up in my hand on the week-end. I need a shot to settle it sown." Patient directed to discuss with RN/MD.  Pain/Comfort:  Pain Rating 1: 8/10  Location - Side 1: Right  Location 1: hand  Pain Addressed 1: Pre-medicate for activity, Reposition, Distraction, Cessation of Activity  Pain Rating " Post-Intervention 1: 8/10      Objective:     Communicated with Shantell Dash RN prior to session.  Patient found supine with oxygen, peripheral IV, telemetry upon PT entry to room.     General Precautions: Standard, fall  Orthopedic Precautions: N/A  Braces: N/A  Respiratory Status: Nasal cannula, flow 2 L/min     Functional Mobility:  Bed Mobility:     Supine to Sit: modified independence and increased effort due to pain in right hand  Transfers:     Sit to Stand:  modified independence with rolling walker  Bed to Chair: modified independence with  rolling walker  using  Step Transfer  Gait: 40' using RW, modified independent; slow ani, decreased ability to use R hand on walker due to pain from gout flare-up; easy fatigue. Patient reports he uses his rollator (bought at a yard sale) at home but that it is too narrow. Patient's sister is obtaining him a wider one, per pt report.      AM-PAC 6 CLICK MOBILITY  Turning over in bed (including adjusting bedclothes, sheets and blankets)?: 4  Sitting down on and standing up from a chair with arms (e.g., wheelchair, bedside commode, etc.): 4  Moving from lying on back to sitting on the side of the bed?: 4  Moving to and from a bed to a chair (including a wheelchair)?: 4  Need to walk in hospital room?: 3  Climbing 3-5 steps with a railing?: 3  Basic Mobility Total Score: 22       Treatment & Education:  Gait and t/f as above    Patient left up in chair with all lines intact, call button in reach, and Shantell Dash RN notified..    GOALS:   Multidisciplinary Problems       Physical Therapy Goals          Problem: Physical Therapy    Goal Priority Disciplines Outcome Goal Variances Interventions   Physical Therapy Goal     PT, PT/OT Ongoing, Progressing     Description: Short Term Goals  Ambulate SBA - 100 feet with rolling walker assistive device.   Supine to sit minimum  Sit to stand minimum  SPT minimum  5. Independent with HEP    Long Term Goals  Ambulate SBA -  200 feet with rolling walker assistive device.   Supine to sit stand-by  Sit to stand stand-by  SPT stand-by  Needed equipment for home.                              Time Tracking:     PT Received On: 08/14/23  PT Start Time: 1106     PT Stop Time: 1127  PT Total Time (min): 21 min     Billable Minutes: Gait Training 15 minutes    Treatment Type: Treatment  PT/PTA: PT     Number of PTA visits since last PT visit: 0     08/14/2023

## 2023-08-14 NOTE — ASSESSMENT & PLAN NOTE
"Patient's FSGs are uncontrolled due to hyperglycemia on current medication regimen.  Last A1c reviewed- No results found for: "LABA1C", "HGBA1C"  Most recent fingerstick glucose reviewed- No results for input(s): "POCTGLUCOSE" in the last 24 hours.  Current correctional scale  Medium  Maintain anti-hyperglycemic dose as follows-   Antihyperglycemics (From admission, onward)    Start     Stop Route Frequency Ordered    08/03/23 0400  insulin aspart U-100 injection 1-10 Units         -- SubQ Every 6 hours PRN 08/03/23 0358        Hold Oral hypoglycemics while patient is in the hospital.  Reduce home insulin to half the dose.   "

## 2023-08-14 NOTE — PLAN OF CARE
Problem: Adult Inpatient Plan of Care  Goal: Plan of Care Review  Outcome: Ongoing, Progressing  Goal: Patient-Specific Goal (Individualized)  Outcome: Ongoing, Progressing  Goal: Absence of Hospital-Acquired Illness or Injury  Outcome: Ongoing, Progressing  Goal: Optimal Comfort and Wellbeing  Outcome: Ongoing, Progressing  Goal: Readiness for Transition of Care  Outcome: Ongoing, Progressing     Problem: Bariatric Environmental Safety  Goal: Safety Maintained with Care  Outcome: Ongoing, Progressing     Problem: Diabetes Comorbidity  Goal: Blood Glucose Level Within Targeted Range  Outcome: Ongoing, Progressing     Problem: Fluid and Electrolyte Imbalance (Acute Kidney Injury/Impairment)  Goal: Fluid and Electrolyte Balance  Outcome: Ongoing, Progressing     Problem: Oral Intake Inadequate (Acute Kidney Injury/Impairment)  Goal: Optimal Nutrition Intake  Outcome: Ongoing, Progressing     Problem: Renal Function Impairment (Acute Kidney Injury/Impairment)  Goal: Effective Renal Function  Outcome: Ongoing, Progressing     Problem: Gas Exchange Impaired  Goal: Optimal Gas Exchange  Outcome: Ongoing, Progressing     Problem: Fall Injury Risk  Goal: Absence of Fall and Fall-Related Injury  Outcome: Ongoing, Progressing     Problem: Impaired Wound Healing  Goal: Optimal Wound Healing  Outcome: Ongoing, Progressing     Problem: Infection  Goal: Absence of Infection Signs and Symptoms  Outcome: Ongoing, Progressing     Problem: Skin Injury Risk Increased  Goal: Skin Health and Integrity  Outcome: Ongoing, Progressing

## 2023-08-15 ENCOUNTER — PATIENT OUTREACH (OUTPATIENT)
Dept: ADMINISTRATIVE | Facility: CLINIC | Age: 63
End: 2023-08-15

## 2023-08-15 NOTE — PROGRESS NOTES
C3 nurse attempted to contact Abilio Carroll  for a TCC post hospital discharge follow up call. No answer. Spoke with patient's sister, stated pt was at his home. Left message and call back information with sister. The patient has a scheduled [facility] [place] appointment with Brandt Rucker MD  on 8/21/23 @ 1100.

## 2023-08-15 NOTE — PROGRESS NOTES
C3 nurse received inbox message from sister Rupa Jeronimo. C3 nurse attempted to contact  for a TCC post hospital discharge follow up call. No answer. Left voicemail with callback information. The patient has a scheduled HOSFU appointment with Brandt Rucker MD  on 8/21/23 @ 1100.

## 2023-08-16 NOTE — PHYSICIAN QUERY
PT Name: Abilio Carroll  MR #: [id number]     DOCUMENTATION CLARIFICATION     CDS/: JOSE FRANCISCO OCONNOR MSN RN              Contact information:[email or web address]  This form is a permanent document in the medical record.     Query Date: August 16, 2023    By submitting this query, we are merely seeking further clarification of documentation.  Please utilize your independent clinical judgment when addressing the question(s) below.    The Medical Record contains the following   Indicators Supporting Clinical Findings Location in Medical Record   x Heart Failure documented Assessment and Plan:   CHF (congestive heart failure)  Patient with clinical diagnosis of heart failure on the exam     Assessment and Plan:  Acute on chronic heart failure   H&P, , 08/03          Gudelia CINTRON, 08/07   x BNP BNP =408 (08/02)    BNP= 951 (08/06)   Lab results   x EF/Echo Echo-  Left Ventricle: The left ventricle is normal in size. Normal wall thickness. Normal wall motion. There is normal systolic function with a visually estimated ejection fraction of 55 - 60%. There is normal diastolic function.  Left Atrium: Left atrium is mildly dilated.  Right Ventricle: Normal right ventricular cavity size. Wall thickness is normal. Right ventricle wall motion  is normal. Systolic function is normal.  Mitral Valve: There is trace regurgitation.  Tricuspid Valve: There is trace regurgitation.  Pulmonic Valve: There is no significant stenosis. The estimated PA systolic pressure is 34 mmHg.  IVC/SVC: Normal venous pressure at 3 mmHg.   Gudelia CINTRONP, 08/06   x Radiology findings Chest xray-  Impression:     Endotracheal tube in place.  Multifocal patchy perihilar and basilar interstitial opacities are suggestive of developing pulmonary edema changes and/or infectious/inflammatory infiltrates   Radiology results, 08/05   x Subjective/Objective Respiratory Conditions Patient with clinical diagnosis of heart failure on  the exam with crackles in the lung and infiltrates on chest xray, distended abd, edema in LE and SOB H&P, , 08/03    Recent/Current MI      Heart Transplant, LVAD      Edema, JVD      Ascites     x Diuretics/Meds Takes lasix 40 mg BID PO at home, will continue with lasix 40 mg BID IV here  Continue home spironolactone 25 mg   Continue Coreg with half of the home dose   H&P, , 08/03   x Other Treatment Echo pending  Strict I&O  Weight daily   Cardiac monitoring  Place on fluid restriction of 1.5 L. H&P, , 08/03    Other       Heart failure is a clinical diagnosis which includes symptomatic fluid retention, elevated intracardiac pressures, and/or the inability of the heart to deliver adequate blood flow.    Heart Failure with reduced Ejection Fraction (HFrEF) or Systolic Heart Failure (loses ability to contract normally, EF is <40%)    Heart Failure with preserved Ejection Fraction (HFpEF) or Diastolic Heart Failure (stiff ventricles, does not relax properly, EF is >50%)     Heart Failure with Combined Systolic and Diastolic Failure (stiff ventricles, does not relax properly and EF is <50%)    Mid-range or mildly reduced ejection fraction (HFmrEF) is classified as systolic heart failure.  Congestive heart failure with a recovered EF is classified as Diastolic Heart Failure.  Common clues to acute exacerbation:  Rapidly progressive symptoms (w/in 2 weeks of presentation), using IV diuretics, using supplemental O2, pulmonary edema on Xray, new or worsening pleural effusion, +JVD or other signs of volume overload, MI w/in 4 weeks, and/or BNP >500  The clinical guidelines noted are only system guidelines, and do not replace the providers clinical judgment.    Provider, please further specify the  CHF diagnosis associated with the above clinical findings.    [   ]  Acute on Chronic Diastolic Heart Failure (HFpEF) - worsening of CHF signs/symptoms in preexisting CHF   [   ]   Chronic Diastolic Heart Failure (HFpEF) - preexisting and stable   [   ]  Other (please specify): ___________________________________   [x  ]  Clinically Undetermined           Please document in your progress notes daily for the duration of treatment until resolved and include in your discharge summary.    References:  American Heart Association editorial staff. (2017, May). Ejection Fraction Heart Failure Measurement. American Heart Association. https://www.heart.org/en/health-topics/heart-failure/diagnosing-heart-failure/ejection-fraction-heart-failure-measurement#:~:text=Ejection%20fraction%20(EF)%20is%20a,pushed%20out%20with%20each%20heartbeat  ROSA Traylor (2020, December 15). Heart failure with preserved ejection fraction: Clinical manifestations and diagnosis. [employer]. https://www.[employer]/contents/heart-failure-with-preserved-ejection-fraction-clinical-manifestations-and-diagnosis.  ICD-10-CM/PCS Coding Clinic Third Quarter ICD-10, Effective with discharges: September 8, 2020 [facility] § Heart failure with mid-range or mildly reduced ejection fraction (2020).  ICD-10-CM/PCS Coding Clinic Third Quarter ICD-10, Effective with discharges: September 8, 2020 [facility] § Heart failure with recovered ejection fraction (2020).  Form No. 13915

## 2023-11-13 PROBLEM — J96.20 ACUTE ON CHRONIC RESPIRATORY FAILURE: Status: RESOLVED | Noted: 2023-08-04 | Resolved: 2023-11-13

## 2024-03-06 ENCOUNTER — TELEPHONE (OUTPATIENT)
Dept: GASTROENTEROLOGY | Facility: CLINIC | Age: 64
End: 2024-03-06
Payer: MEDICARE

## 2024-07-25 DIAGNOSIS — Z12.11 ENCOUNTER FOR SCREENING COLONOSCOPY: Primary | ICD-10-CM

## 2025-01-09 ENCOUNTER — ANESTHESIA (OUTPATIENT)
Dept: GASTROENTEROLOGY | Facility: HOSPITAL | Age: 65
End: 2025-01-09
Payer: MEDICARE

## 2025-01-09 ENCOUNTER — ANESTHESIA EVENT (OUTPATIENT)
Dept: GASTROENTEROLOGY | Facility: HOSPITAL | Age: 65
End: 2025-01-09
Payer: MEDICARE

## 2025-01-09 ENCOUNTER — HOSPITAL ENCOUNTER (OUTPATIENT)
Dept: GASTROENTEROLOGY | Facility: HOSPITAL | Age: 65
Discharge: HOME | End: 2025-01-09
Attending: INTERNAL MEDICINE | Admitting: INTERNAL MEDICINE
Payer: MEDICARE

## 2025-01-09 VITALS
TEMPERATURE: 97 F | SYSTOLIC BLOOD PRESSURE: 112 MMHG | HEIGHT: 73 IN | HEART RATE: 75 BPM | WEIGHT: 315 LBS | DIASTOLIC BLOOD PRESSURE: 46 MMHG | BODY MASS INDEX: 41.75 KG/M2 | OXYGEN SATURATION: 99 % | RESPIRATION RATE: 21 BRPM

## 2025-01-09 DIAGNOSIS — Z12.11 ENCOUNTER FOR SCREENING COLONOSCOPY: ICD-10-CM

## 2025-01-09 DIAGNOSIS — K57.30 DIVERTICULA, COLON: Primary | ICD-10-CM

## 2025-01-09 DIAGNOSIS — K63.5 POLYP OF ASCENDING COLON, UNSPECIFIED TYPE: ICD-10-CM

## 2025-01-09 PROCEDURE — 88305 TISSUE EXAM BY PATHOLOGIST: CPT | Mod: TC,SUR | Performed by: INTERNAL MEDICINE

## 2025-01-09 PROCEDURE — 27000284 HC CANNULA NASAL: Performed by: ANESTHESIOLOGY

## 2025-01-09 PROCEDURE — 25000242 PHARM REV CODE 250 ALT 637 W/ HCPCS: Performed by: ANESTHESIOLOGY

## 2025-01-09 PROCEDURE — 63600175 PHARM REV CODE 636 W HCPCS: Performed by: ANESTHESIOLOGY

## 2025-01-09 RX ORDER — LIDOCAINE HYDROCHLORIDE 20 MG/ML
INJECTION, SOLUTION EPIDURAL; INFILTRATION; INTRACAUDAL; PERINEURAL
Status: DISCONTINUED | OUTPATIENT
Start: 2025-01-09 | End: 2025-01-09

## 2025-01-09 RX ORDER — SODIUM CHLORIDE, SODIUM LACTATE, POTASSIUM CHLORIDE, CALCIUM CHLORIDE 600; 310; 30; 20 MG/100ML; MG/100ML; MG/100ML; MG/100ML
INJECTION, SOLUTION INTRAVENOUS CONTINUOUS
Status: DISCONTINUED | OUTPATIENT
Start: 2025-01-09 | End: 2025-01-10 | Stop reason: HOSPADM

## 2025-01-09 RX ORDER — IPRATROPIUM BROMIDE 0.5 MG/2.5ML
SOLUTION RESPIRATORY (INHALATION)
Status: DISCONTINUED | OUTPATIENT
Start: 2025-01-09 | End: 2025-01-09

## 2025-01-09 RX ORDER — SODIUM CHLORIDE 0.9 % (FLUSH) 0.9 %
10 SYRINGE (ML) INJECTION EVERY 6 HOURS PRN
Status: DISCONTINUED | OUTPATIENT
Start: 2025-01-09 | End: 2025-01-10 | Stop reason: HOSPADM

## 2025-01-09 RX ORDER — PROPOFOL 10 MG/ML
VIAL (ML) INTRAVENOUS
Status: DISCONTINUED | OUTPATIENT
Start: 2025-01-09 | End: 2025-01-09

## 2025-01-09 RX ADMIN — PROPOFOL 50 MG: 10 INJECTION, EMULSION INTRAVENOUS at 10:01

## 2025-01-09 RX ADMIN — IPRATROPIUM BROMIDE 0.02 MCG: 0.5 SOLUTION RESPIRATORY (INHALATION) at 10:01

## 2025-01-09 RX ADMIN — PROPOFOL 50 MG: 10 INJECTION, EMULSION INTRAVENOUS at 09:01

## 2025-01-09 RX ADMIN — PROPOFOL 100 MG: 10 INJECTION, EMULSION INTRAVENOUS at 10:01

## 2025-01-09 RX ADMIN — LIDOCAINE HYDROCHLORIDE 50 MG: 20 INJECTION, SOLUTION INTRAVENOUS at 09:01

## 2025-01-09 NOTE — DISCHARGE INSTRUCTIONS
Procedure Date  1/9/25     Impression  Overall Impression:   2 polyps in the ascending colon; performed cold forceps biopsy  Rectal exam revealed no mass.  The colon was examined from the rectum to the cecum and retained liquid stool was present so small polyps can not be totally excluded.  Two diminutive polyps were removed with biopsy from the ascending colon.  Scattered diverticula were noted in the ascending descending sigmoid colon.  Impression:  Screening for colon neoplasm, colon diverticulosis, 2 polyps were removed during this exam.  l  Recommendation  Await pathology results, due: 1/13/2025   Repeat colonoscopy in 5 years      Disposition: Discharge patient to home in stable condition.  High-fiber diet.  No driving until tomorrow.  Follow up with PCP as scheduled, return to GI clinic p.r.n..  Follow up pathology results next week.    THE NURSE WILL CALL YOU WITH YOUR BIOPSY RESULTS IN A FEW DAYS. IF YOU HAVE  [facility] MYCHART YOUR RESULTS WILL APPEAR THERE AS WELL.NO DRIVING, OPERATING EQUIPMENT, OR SIGNING LEGAL DOCUMENTS FOR 24 HOURS.Please call the GI Lab if you have any nausea, vomiting, or abdominal pain.

## 2025-01-09 NOTE — ANESTHESIA PREPROCEDURE EVALUATION
01/09/2025  Abilio Carroll is a 64 y.o., male.      Pre-op Assessment    I have reviewed the Patient Summary Reports.     I have reviewed the Nursing Notes. I have reviewed the NPO Status.   I have reviewed the Medications.     Review of Systems  Anesthesia Hx:  No problems with previous Anesthesia             Denies Family Hx of Anesthesia complications.    Denies Personal Hx of Anesthesia complications.                    Social:  Smoker, Alcohol Use + illicit drug use, heavy alcohol use, poor historian, non compliant with management of chronic conditions      Hematology/Oncology:       -- Anemia:               Hematology Comments:                      Cardiovascular:  Exercise tolerance: poor   Hypertension       CHF    hyperlipidemia                               Pulmonary:   COPD, moderate     Sleep Apnea Non compliant with CPAP, likely pulm HTN  Will give pre-op duoneb               Renal/:  Chronic Renal Disease, CKD                Hepatic/GI:                       Musculoskeletal:  Arthritis               Endocrine:  Diabetes, poorly controlled, type 2         Morbid Obesity / BMI > 40      Physical Exam  General: Well nourished, Cooperative, Alert and Oriented    Airway:  Mallampati: III   Mouth Opening: Normal  TM Distance: Normal  Tongue: Normal  Neck ROM: Normal ROM    Chest/Lungs:  Normal Respiratory Rate  expiratory wheezes    Heart:  Rate: Normal  Rhythm: Regular Rhythm        Chemistry        Component Value Date/Time     08/14/2023 0520    K 3.8 08/14/2023 0520     08/14/2023 0520    CO2 34 (H) 08/14/2023 0520    BUN 31 (H) 08/14/2023 0520    CREATININE 1.00 08/14/2023 0520    GLU 97 08/14/2023 0520        Component Value Date/Time    CALCIUM 9.3 08/14/2023 0520    ALKPHOS 78 08/04/2023 0709    AST 15 08/04/2023 0709    ALT 27 08/04/2023 0709    BILITOT 0.7 08/04/2023 0709     ESTGFRAFRICA 58 08/02/2020 0430    EGFRNONAA 48 08/02/2020 0430        Lab Results   Component Value Date    WBC 6.07 08/14/2023    HGB 9.9 (L) 08/14/2023    HCT 37.6 (L) 08/14/2023    PLT 64 (L) 08/14/2023     Results for orders placed or performed during the hospital encounter of 08/02/23   EKG 12-lead    Collection Time: 08/02/23 11:33 PM    Narrative    Test Reason : R06.02,    Vent. Rate : 070 BPM     Atrial Rate : 000 BPM     P-R Int : 214 ms          QRS Dur : 082 ms      QT Int : 410 ms       P-R-T Axes : 057 042 043 degrees     QTc Int : 428 ms    Sinus arrhythmia with borderline 1st degree A-V block  Low QRS voltages in precordial leads  Borderline ECG    Confirmed by Jose Paulino MD ([id number]) on 8/4/2023 4:53:54 AM    Referred By: [email or web address]   SELF           Confirmed By:Jose Paulino MD     TTE 10/04/2022  Summary    The left ventricle is normal in size with normal systolic function.  The estimated ejection fraction is 65%.  Normal left ventricular diastolic function.  Moderate right ventricular enlargement.  Moderate right atrial enlargement.  Moderate mitral regurgitation.  Mild tricuspid regurgitation.  Elevated central venous pressure (15 mmHg).  The estimated PA systolic pressure is 46 mmHg.  There is pulmonary hypertension.  Trivial pericardial effusion.        Anesthesia Plan  Type of Anesthesia, risks & benefits discussed:    Anesthesia Type: MAC  Intra-op Monitoring Plan: Standard ASA Monitors  Post Op Pain Control Plan: multimodal analgesia  Induction:  IV  Informed Consent: Informed consent signed with the Patient and all parties understand the risks and agree with anesthesia plan.  All questions answered.   ASA Score: 3  Day of Surgery Review of History & Physical: H&P Update referred to the surgeon/provider.I have interviewed and examined the patient. I have reviewed the patient's H&P dated: There are no significant changes.     Ready For Surgery From Anesthesia Perspective.      .

## 2025-01-09 NOTE — TRANSFER OF CARE
"Anesthesia Transfer of Care Note    Patient: Abilio ARORA Carroll    Procedure(s) Performed: * colonoscopy *    Patient location: GI    Anesthesia Type: MAC    Transport from OR: Transported from OR on room air with adequate spontaneous ventilation    Post pain: adequate analgesia    Post assessment: no apparent anesthetic complications    Post vital signs: stable    Level of consciousness: responds to stimulation    Nausea/Vomiting: no nausea/vomiting    Complications: none    Transfer of care protocol was followed      Last vitals: Visit Vitals  /65 (BP Location: Right arm, Patient Position: Lying)   Pulse 72   Temp 36.7 °C (98 °F) (Oral)   Resp 13   Ht 6' 1" (1.854 m)   Wt (!) 150.6 kg (332 lb)   SpO2 99%   BMI 43.80 kg/m²     "

## 2025-01-09 NOTE — H&P
[facility] End[facility]  Gastroenterology  H&P    Patient Name: Abilio Carroll  MRN: [id number]  Admission Date: 1/9/2025  Code Status: Prior    Attending Provider: Jamshid Brown MD   Primary Care Physician: Brandt Rucker MD  Principal Problem:<principal problem not specified>    Subjective:     History of Present Illness:  This patient is a 64-year-old man who presents for screening colonoscopy.  He has history of COPD and heart failure as well as prostate cancer.  No prior colonoscopy report is available on this record.    Past Medical History:   Diagnosis Date    Cardiomyopathy     COPD (chronic obstructive pulmonary disease)     Diabetes mellitus, type 2     Gout     Heart failure, unspecified     Hypertension     Obstructive sleep apnea on CPAP     Osteoarthritis     Prostate cancer        History reviewed. No pertinent surgical history.    Review of patient's allergies indicates:  No Known Allergies  Family History    None       Tobacco Use    Smoking status: Every Day     Current packs/day: 1.00     Average packs/day: 1 pack/day for 26.0 years (26.0 ttl pk-yrs)     Types: Cigarettes     Start date: 1999    Smokeless tobacco: Never   Substance and Sexual Activity    Alcohol use: Never    Drug use: Never    Sexual activity: Not on file     Review of Systems   Respiratory: Negative.     Cardiovascular: Negative.    Gastrointestinal: Negative.      Objective:     Vital Signs (Most Recent):  Temp: 98 °F (36.7 °C) (01/09/25 0908)  Pulse: 72 (01/09/25 0908)  Resp: 13 (01/09/25 0908)  BP: 119/65 (01/09/25 0908)  SpO2: 99 % (01/09/25 0908) Vital Signs (24h Range):  Temp:  [98 °F (36.7 °C)] 98 °F (36.7 °C)  Pulse:  [72] 72  Resp:  [13] 13  SpO2:  [99 %] 99 %  BP: (119)/(65) 119/65     Weight: (!) 150.6 kg (332 lb) (01/09/25 0908)  Body mass index is 43.8 kg/m².    No intake or output data in the 24 hours ending 01/09/25 0951    Lines/Drains/Airways       Peripheral Intravenous Line  Duration                   "Peripheral IV - Single Lumen 01/09/25 0907 22 G Left Forearm <1 day                    Physical Exam  Vitals reviewed.   Constitutional:       General: He is not in acute distress.     Appearance: Normal appearance. He is well-developed. He is obese. He is not ill-appearing.   HENT:      Head: Normocephalic and atraumatic.      Nose: Nose normal.   Eyes:      Pupils: Pupils are equal, round, and reactive to light.   Cardiovascular:      Rate and Rhythm: Normal rate and regular rhythm.   Pulmonary:      Effort: Pulmonary effort is normal.      Breath sounds: Normal breath sounds. No wheezing.   Abdominal:      General: Abdomen is flat. Bowel sounds are normal. There is no distension.      Palpations: Abdomen is soft.      Tenderness: There is no abdominal tenderness. There is no guarding.   Skin:     General: Skin is warm and dry.      Coloration: Skin is not jaundiced.   Neurological:      Mental Status: He is alert.   Psychiatric:         Attention and Perception: Attention normal.         Mood and Affect: Affect normal.         Speech: Speech normal.         Behavior: Behavior is cooperative.      Comments: Pt was calm while speaking.         Significant Labs:  CBC: No results for input(s): "WBC", "HGB", "HCT", "PLT" in the last 48 hours.  CMP: No results for input(s): "GLU", "CALCIUM", "ALBUMIN", "PROT", "NA", "K", "CO2", "CL", "BUN", "CREATININE", "ALKPHOS", "ALT", "AST", "BILITOT" in the last 48 hours.    Significant Imaging:  Imaging results within the past 24 hours have been reviewed.    Assessment/Plan:     There are no hospital problems to display for this patient.        Impression: Screening for colon neoplasm  Plan: Colonoscopy today    Jamshid Brown MD  Gastroenterology  [facility] End[facility]  "

## 2025-01-09 NOTE — ANESTHESIA POSTPROCEDURE EVALUATION
Anesthesia Post Evaluation    Patient: Abilio Carroll    Procedure(s) Performed: *colonoscopy *    Final Anesthesia Type: MAC      Patient location during evaluation: GI PACU  Patient participation: Yes- Able to Participate  Level of consciousness: awake and alert, oriented and awake  Post-procedure vital signs: reviewed and stable  Pain management: adequate  Airway patency: patent    PONV status at discharge: No PONV  Anesthetic complications: no      Cardiovascular status: blood pressure returned to baseline, hemodynamically stable and stable  Respiratory status: unassisted and spontaneous ventilation  Hydration status: euvolemic  Follow-up not needed.              Vitals Value Taken Time   /78 01/09/25 1212   Temp 36.1 °C (97 °F) 01/09/25 1026   Pulse 91 01/09/25 1320   Resp 24 01/09/25 1303   SpO2 96 % 01/09/25 1320   Vitals shown include unfiled device data.      Event Time   Out of Recovery 11:05:00         Pain/Rachel Score: Rachel Score: 10 (1/9/2025 10:58 AM)

## 2025-01-10 LAB
ESTROGEN SERPL-MCNC: NORMAL PG/ML
INSULIN SERPL-ACNC: NORMAL U[IU]/ML
LAB AP GROSS DESCRIPTION: NORMAL
LAB AP LABORATORY NOTES: NORMAL
T3RU NFR SERPL: NORMAL %

## 2025-01-13 ENCOUNTER — TELEPHONE (OUTPATIENT)
Dept: GASTROENTEROLOGY | Facility: CLINIC | Age: 65
End: 2025-01-13
Payer: MEDICARE

## 2025-01-13 NOTE — TELEPHONE ENCOUNTER
Attempted to call results. No answer or voicemail noted. 5 year recall placed on chart.            ----- Message from Jamshid Brown MD sent at 1/10/2025  1:04 PM CST -----  The colon polyps were tubular adenomas.  Recommendation: Repeat colonoscopy in 5 years.

## 2025-08-07 ENCOUNTER — HOSPITAL ENCOUNTER (OUTPATIENT)
Dept: RADIOLOGY | Facility: HOSPITAL | Age: 65
Discharge: HOME | End: 2025-08-07
Attending: FAMILY MEDICINE
Payer: MEDICARE

## 2025-08-07 DIAGNOSIS — I73.9 PVD (PERIPHERAL VASCULAR DISEASE): ICD-10-CM

## 2025-08-07 PROCEDURE — 93922 UPR/L XTREMITY ART 2 LEVELS: CPT | Mod: TC
